# Patient Record
Sex: MALE | Race: WHITE | NOT HISPANIC OR LATINO | Employment: OTHER | URBAN - METROPOLITAN AREA
[De-identification: names, ages, dates, MRNs, and addresses within clinical notes are randomized per-mention and may not be internally consistent; named-entity substitution may affect disease eponyms.]

---

## 2017-01-06 ENCOUNTER — PATIENT OUTREACH (OUTPATIENT)
Dept: HEALTH INFORMATION MANAGEMENT | Facility: OTHER | Age: 77
End: 2017-01-06

## 2017-01-06 ENCOUNTER — TELEPHONE (OUTPATIENT)
Dept: MEDICAL GROUP | Facility: CLINIC | Age: 77
End: 2017-01-06

## 2017-01-06 NOTE — Clinical Note
[employer] [facility]  Edgardo Castano D.O.  [address]  [place] [address]  Fax: [phone] Authorization for Release/Disclosure of Protected Health Information   Name: SUKUMAR LIN PAGE : 1940 SSN: [id number]   Address: [facility] [place] [address] Phone:    [phone] (home)    I authorize the entity listed below to release/disclose the PHI below to [facility]/Edgardo Castano D.O.   Provider or Entity Name: Kendal Cotton O.D.         Address   City, [facility], [facility]   Phone:      Fax: [phone]     Reason for request: continuity of care   Information to be released:    [  ] LAST COLONOSCOPY, including any PATH REPORT [  ] LAST DEXA  [  ] LAST MAMMOGRAM  [  ] LAST PAP [ X ] RETINA EXAM REPORT  [  ] IMMUNIZATION RECORDS  [  ] Release all info      [  ] Check here and initial the line next to each item to release ALL health information INCLUDING  _____ Care and treatment for drug and / or alcohol abuse  _____ HIV testing, infection status, or AIDS  _____ Genetic Testing    DATES OF SERVICE OR TIME PERIOD TO BE DISCLOSED:Recent eye exam   I understand and acknowledge that:  * This Authorization may be revoked at any time by you in writing, except if your health information has already been used or disclosed.  * Your health information that will be used or disclosed as a result of you signing this authorization could be re-disclosed by the recipient. If this occurs, your re-disclosed health information may no longer be protected by State or Federal laws.  * You may refuse to sign this Authorization. Your refusal will not affect your ability to obtain treatment.  * This Authorization becomes effective upon signing and will  on (date) __________. If no date is indicated, this Authorization will  one (1) year from the signature date.    Name: Sukumar Lin Page    Signature:     Date: 2017

## 2017-01-06 NOTE — TELEPHONE ENCOUNTER
ANNUAL WELLNESS VISIT PRE-VISIT PLANNING     1.  Reviewed last PCP office visit assessment and plan notes: Yes    2.  If any orders were placed last visit do we have Results/Consult Notes?        •  Labs? Yes , completed 12/19/16       •  Imaging? No        •  Referrals? No     3.  Patient Care Coordination Note was updated with diagnosis information:  Yes, In your medical judgement are the following conditions valid-present for 2017, if so can you please assess and document:   C92.90              Myeloid Leukemia unspecified   E11.9                DM Type 2, uncomplicated   Z79.4                 Insulin use   E21.3                Hyperparathyroidism   I71.2                  Thoracic Aortic Aneurysm without Rupture    4.  Patient is due for these Health Maintenance Topics:   Health Maintenance Due   Topic Date Due   • Annual Wellness Visit  1940   • IMM ZOSTER VACCINE  10/30/1999   • IMM PNEUMOCOCCAL 65+ (ADULT) HIGH/HIGHEST RISK SERIES (2 of 2 - PPSV23) 03/08/2016          •  ALPA letter was faxed to:  Kendal Cotton O.D.   for Retinal exam records    5.  Immunizations were updated in [employer] using WebIZ?: Yes       •  Web Iz Recommendations:  PPSV 23       •  Is patient due for Tdap/Shingles? Yes.  If yes, was patient alerted of copay? Yes    6.  Patient has:       •   Diabetes    7.  Updated Care Team with [employer] and all specialists?        •   Gait devices, O2, CPAP, etc: N\A        •   Eye professional: yes       •   Other specialists (GYN, cardiology, endo, etc): N\A    8.  Is patient in need of any refills prior to office visit? No        9.  Patient was informed to arrive 15 min prior to their scheduled appointment and bring in their medication bottles? yes    10.  Patient was advised: “This is a free wellness visit. The provider will screen for medical conditions to help you stay healthy. If you have other concerns to address you may be asked to discuss these at a separate visit or there may be an  additional fee.”  Yes

## 2017-01-06 NOTE — PROGRESS NOTES
Outbound call to Sukumar for medication reconciliation.    Updated allergy and medication lists.    Patient demonstrates understanding of medication schedule and purpose for taking each medication.    Sukumar discontinued his muscle relaxants. He is now being seen by a chiropractor every month and is doing well.     Patient denies any side effects from his medications.     Sukumar has appropriate medication regimen for current disease states.    Laura Monte, PHARMD

## 2017-01-06 NOTE — Clinical Note
Request for Medical Records    Patient Name: Sukumar Mode Page    : 1940      Dear Doctor Kendal Cotton O.D.      The above named patient receives primary care at the [facility] by Edgardo Castano D.O..  The patient informs us that you are his eye care Provider.    Please fax a copy of the most recent eye exam to [phone] or answer the  questions below and fax this sheet back to us at the above number.  Attached is a signed Release of Information.      Date of last eye exam: _____________    Retinal eye exam summary:        Please select the choice(s) that apply.    ____ No diabetic retinopathy    ____    Diabetic retinopathy present      Printed Name and Credentials: __________________________________    Signature of Eye Care Provider: _________________________________    We appreciate your assistance and collaboration in providing efficient patient care!    Kindest Regards,    [address] [place] [address] [phone]

## 2017-01-16 RX ORDER — FUROSEMIDE 40 MG/1
TABLET ORAL
Qty: 90 TAB | Refills: 3 | Status: SHIPPED | OUTPATIENT
Start: 2017-01-16 | End: 2018-01-25 | Stop reason: SDUPTHER

## 2017-01-18 ENCOUNTER — OFFICE VISIT (OUTPATIENT)
Dept: MEDICAL GROUP | Facility: CLINIC | Age: 77
End: 2017-01-18
Payer: MEDICARE

## 2017-01-18 VITALS
DIASTOLIC BLOOD PRESSURE: 65 MMHG | TEMPERATURE: 98.8 F | BODY MASS INDEX: 40.09 KG/M2 | WEIGHT: 296 LBS | RESPIRATION RATE: 16 BRPM | SYSTOLIC BLOOD PRESSURE: 125 MMHG | HEIGHT: 72 IN | OXYGEN SATURATION: 96 % | HEART RATE: 62 BPM

## 2017-01-18 DIAGNOSIS — E11.9 TYPE II DIABETES MELLITUS, WELL CONTROLLED (HCC): ICD-10-CM

## 2017-01-18 DIAGNOSIS — C92.10 CML (CHRONIC MYELOCYTIC LEUKEMIA) (HCC): ICD-10-CM

## 2017-01-18 DIAGNOSIS — E78.5 DYSLIPIDEMIA: ICD-10-CM

## 2017-01-18 DIAGNOSIS — I10 ESSENTIAL HYPERTENSION: ICD-10-CM

## 2017-01-18 DIAGNOSIS — I35.1 AORTIC VALVE INSUFFICIENCY, UNSPECIFIED ETIOLOGY: ICD-10-CM

## 2017-01-18 DIAGNOSIS — Z23 NEED FOR 23-POLYVALENT PNEUMOCOCCAL POLYSACCHARIDE VACCINE: ICD-10-CM

## 2017-01-18 PROCEDURE — G0439 PPPS, SUBSEQ VISIT: HCPCS | Mod: 25 | Performed by: INTERNAL MEDICINE

## 2017-01-18 PROCEDURE — G0009 ADMIN PNEUMOCOCCAL VACCINE: HCPCS | Performed by: INTERNAL MEDICINE

## 2017-01-18 PROCEDURE — 90732 PPSV23 VACC 2 YRS+ SUBQ/IM: CPT | Performed by: INTERNAL MEDICINE

## 2017-01-18 ASSESSMENT — PATIENT HEALTH QUESTIONNAIRE - PHQ9: CLINICAL INTERPRETATION OF PHQ2 SCORE: 0

## 2017-01-18 NOTE — PROGRESS NOTES
"Chief Complaint   Patient presents with   • Annual Wellness Visit         HPI:  Sukumar is a 76 y.o. male here for Medicare Annual Wellness Visit         Patient Active Problem List    Diagnosis Date Noted   • Type II or unspecified type diabetes mellitus without mention of complication, not stated as uncontrolled 05/07/2015   • HTN (hypertension) 01/27/2012   • Dyslipidemia 01/27/2012   • Aortic valve insufficiency 08/25/2011   • CML (chronic myelocytic leukemia) (CMS-HCC) 08/25/2011       Current Outpatient Prescriptions   Medication Sig Dispense Refill   • furosemide (LASIX) 40 MG Tab TAKE ONE TABLET BY MOUTH ONCE DAILY 90 Tab 3   • benazepril (LOTENSIN) 10 MG Tab TAKE ONE TABLET BY MOUTH ONCE DAILY 90 Tab 3   • doxazosin (CARDURA) 2 MG Tab TAKE TWO TABLETS BY MOUTH ONCE DAILY 180 Tab 3   • CRESTOR 5 MG Tab TAKE ONE TABLET BY MOUTH IN THE EVENING 90 Tab 3   • KLOR-CON M20 20 MEQ Tab CR TAKE ONE TABLET BY MOUTH TWICE DAILY 180 Tab 3   • insulin glargine (LANTUS) 100 UNIT/ML Solution Inject 15 Units as instructed every bedtime. 10 mL 11   • ranitidine (ZANTAC) 150 MG Tab TAKE ONE TABLET BY MOUTH TWICE DAILY 180 Tab 3   • Cayenne Powder 40,000 Units by Does not apply route every day. 90 g 3   • Cholecalciferol (VITAMIN D3) 2000 UNITS TABS Take 2,000 Units by mouth every day.     • ascorbic acid (ASCORBIC ACID) 500 MG TABS Take 500 mg by mouth every day.     • Kristie, Zingiber officinalis, (KRISTIE ROOT) 550 MG CAPS Take  by mouth.     • imatinib (GLEEVEC) 100 MG TABS Take 300 mg by mouth every day. Takes 3 tabs once a day.     • INSULIN SYRINGE .5CC/28G 28G X 1/2\" 0.5 ML Misc 1 Each by Does not apply route every day. 50 Each 3     No current facility-administered medications for this visit.        The patient reports adherence to this regimen     Current supplements as per medication list.   Chronic narcotic pain medicines: no    Allergies: Nkda    Current social contact/activities: Ez enjoy his time with grand " daughters, shooting and hunting      Is patient current with immunizations?  no   If no, due for Shingles and PPSV 23     He  reports that he has never smoked. He has never used smokeless tobacco. He reports that he does not drink alcohol or use illicit drugs.  Counseling given: Not Answered        DPA/Advanced Directive:  Patient does not have an advanced directive. Not interested at this time     ROS:    Gait: Uses no assistive device   Ostomy: no   Other tubes: no   Amputations: no   Chronic oxygen use no   Last eye exam 2014   : Denies incontinence.       Screening:    DIABETES  1. Records requested for overdue  topics specifically for diabetes? yes      a. If yes, specifically requested: Retinal exam    2. Has patient ever had diabetes education? No            b. If not, is patient interested? no        Depression Screening    Little interest or pleasure in doing things?  0 - not at all  Feeling down, depressed, or hopeless?  0 - not at all  Patient Health Questionnaire Score: 0    If depressive symptoms identified deferred to follow up visit unless specifically addressed in assessment and plan.    Screening for Cognitive Impairment    Three Minute Recall (banana, sunrise, fence)  2/3    Draw clock face with all 12 numbers set to the hand to show 10 minutes past 11 o'clock  1 5/5  Cognitive concerns identified deferred for follow up unless specifically addressed in assessment and plan.    Fall Risk Assessment    Has the patient had two or more falls in the last year or any fall with injury in the last year?  No    Safety Assessment    Throw rugs on floor.  Yes  Handrails on all stairs.  Yes  Good lighting in all hallways.  Yes  Difficulty hearing.  No  Patient counseled about all safety risks that were identified.    Functional Assessment ADLs    Are there any barriers preventing you from cooking for yourself or meeting nutritional needs?  No.    Are there any barriers preventing you from driving safely or  obtaining transportation?  No.    Are there any barriers preventing you from using a telephone or calling for help?  No.    Are there any barriers preventing you from shopping?  No.    Are there any barriers preventing you from taking care of your own finances?  No.    Are there any barriers preventing you from managing your medications?  No.    Are currently engaging any exercise or physical activity?  No.       Health Maintenance Summary                Annual Wellness Visit Overdue 1940     IMM ZOSTER VACCINE Overdue 10/30/1999     IMM PNEUMOCOCCAL 65+ (ADULT) HIGH/HIGHEST RISK SERIES Overdue 3/8/2016      Done 1/12/2016 Imm Admin: Pneumococcal Conjugate Vaccine (Prevnar/PCV-13)    A1C SCREENING Next Due 6/19/2017      Done 12/19/2016 HEMOGLOBIN A1C (A)     Patient has more history with this topic...    DIABETES MONOFILAMENT / LE EXAM Next Due 10/11/2017      Done 10/11/2016 AMB DIABETIC MONOFILAMENT LOWER EXTREMITY EXAM    FASTING LIPID PROFILE Next Due 12/19/2017      Done 12/19/2016 LIPID PROFILE     Patient has more history with this topic...    URINE ACR / MICROALBUMIN Next Due 12/19/2017      Done 12/19/2016 MICROALBUMIN CREAT RATIO URINE     Patient has more history with this topic...    SERUM CREATININE Next Due 12/19/2017      Done 12/19/2016 BASIC METABOLIC PANEL (A)     Patient has more history with this topic...    COLONOSCOPY Next Due 12/9/2018      Done 12/9/2015 AMB REFERRAL TO GI FOR COLONOSCOPY     Patient has more history with this topic...    RETINAL SCREENING Next Due 10/1/2025      Done 10/1/2015 AMB REFERRAL FOR RETINAL SCREENING EXAM     Patient has more history with this topic...    IMM DTaP/Tdap/Td Vaccine Next Due 7/2/2026      Done 7/2/2016 Imm Admin: Tdap Vaccine          Patient Care Team:  Edgardo Castano D.O. as PCP - General (Internal Medicine)  Ez Huang M.D. as Consulting Physician (Cardiology)  Brennan Cole M.D. as Consulting Physician (Urology)  Eagle Velez  "M.D. as Consulting Physician (Oncology)  Kendal Cotton O.D. as Consulting Physician (Optometry)  Albaro Crowder M.D. as Consulting Physician (Nephrology)    Social History   Substance Use Topics   • Smoking status: Never Smoker    • Smokeless tobacco: Never Used   • Alcohol Use: No     Family History   Problem Relation Age of Onset   • Other Mother 94     age.    • Heart Disease Father      He  has a past medical history of Murmur, heart; Heart burn; Hypertension; Diabetes (2005); Unspecified urinary incontinence; Heart valve disease; Indigestion; Hemorrhoid; Hyperlipidemia; and Cancer (CMS-[facility]).   Past Surgical History   Procedure Laterality Date   • Brachy therapy  06   • Circumcision adult  8/17/2009     Performed by MIGUEL NEVILLE at SURGERY [facility]   • Trans urethral resection prostate  8/17/2009     Performed by MIGUEL NEVILLE at SURGERY [facility]   • Cystoscopy  1/25/2010     Performed by MIGUEL NEVILLE at SURGERY [facility]   • Trans urethral resection prostate  1/25/2010     Performed by MIGUEL NEVILLE at SURGERY [facility]   • Other abdominal surgery  2005     umbilical hernia repair   • Other  2007     vein stripping   • Cystoscopy  4/7/2011     Performed by MIGUEL NEVILLE at SURGERY [facility]   • Sphincter prosthesis placement  4/7/2011     Performed by MIGUEL NEVILLE at SURGERY [facility]   • Colon resection laparoscopic  8/30/2011     Performed by PRISCILLA ROGERS at SURGERY [facility] ORS   • Loi  1/30/2012     Performed by TREY TAPIA at ENDOSCOPY [facility]   • Wide excision  8/12/2014     Performed by Veronica Ruth M.D. at SURGERY SAME DAY [facility]           Exam:     Blood pressure 120/60, pulse 62, temperature 37.1 °C (98.8 °F), resp. rate 16, height 1.829 m (6' 0.01\"), weight 134.265 kg (296 lb), SpO2 96 %. Body mass index is 40.14 kg/(m^2).    Hearing fair.    Dentition fair  Alert, oriented in no acute distress.  Eye contact is good, speech " goal directed, affect calm       Assessment and Plan. The following treatment and monitoring plan is recommended:      1. Type II diabetes mellitus, well controlled (CMS-HCC)  Patient history of type 2 diabetes with excellent control no change in medical therapy.   - Annual Wellness Visit - Includes PPPS Subsequent ()    2. CML (chronic myelocytic leukemia) (CMS-HCC)  Patient with a history of chronic myelocyte leukemia under control followed by oncology.     3. Essential hypertension  Well-controlled no change in medical therapy    4. Dyslipidemia  Stable    5. Aortic valve insufficiency, unspecified etiology  Stable    6. Need for 23-polyvalent pneumococcal polysaccharide vaccine  Vaccination given  - PNEUMOCOCCAL POLYSACCHARIDE VACCINE 23-VALENT =>3YO SQ/IM    Services needed: No services needed at this time  Health Care Screening recommendations as per orders if indicated.  Referrals offered: PT/OT/Nutrition counseling/Behavioral Health/Smoking cessation as per orders if indicated.    Discussion today about general wellness and lifestyle habits:    · Prevent falls and reduce trip hazards; Cautioned about securing or removing rugs.  · Have a working fire alarm and carbon monoxide detector;   · Engage in regular physical activity and social activities   · No goals  (goals)      Follow-up: No Follow-up on file.

## 2017-01-18 NOTE — MR AVS SNAPSHOT
"        Sukumar Mode Page   2017 3:00 PM   Office Visit   MRN: [id number]    Department:  [facility]   Dept Phone:  [phone]    Description:  Male : 1940   Provider:  Edgardo Castano D.O.; [facility]            Reason for Visit     Annual Wellness Visit           Allergies as of 2017     Allergen Noted Reactions    Nkda [No Known Drug Allergy] 2010         You were diagnosed with     Type II diabetes mellitus, well controlled (CMS-HCC)   [729485]       Need for 23-polyvalent pneumococcal polysaccharide vaccine   [7969391]         Vital Signs     Blood Pressure Pulse Temperature Respirations Height Weight    125/65 mmHg 62 37.1 °C (98.8 °F) 16 1.829 m (6' 0.01\") 134.265 kg (296 lb)    Body Mass Index Oxygen Saturation Smoking Status             40.14 kg/m2 96% Never Smoker          Basic Information     Date Of Birth Sex Race Ethnicity Preferred Language    1940 Male White Non- English      Your appointments     May 02, 2017  9:20 AM   Established Patient with Edgardo Castano D.O.   [address] [address]   [phone]           You will be receiving a confirmation call a few days before your appointment from our automated call confirmation system.              Problem List              ICD-10-CM Priority Class Noted - Resolved    Aortic valve insufficiency I35.1   2011 - Present    CML (chronic myelocytic leukemia) (CMS-HCC) C92.10   2011 - Present    HTN (hypertension) I10   2012 - Present    Dyslipidemia E78.5   2012 - Present    Type II diabetes mellitus, well controlled (CMS-HCC) E11.9   2017 - Present      Health Maintenance        Date Due Completion Dates    IMM ZOSTER VACCINE 10/30/1999 ---    IMM PNEUMOCOCCAL 65+ (ADULT) HIGH/HIGHEST RISK SERIES (2 of 2 - PPSV23) 3/8/2016 2016    A1C SCREENING 2016, 8/15/2016, 2016, 2016, 3/2/2015, " 12/3/2014, 6/25/2014, 12/2/2013, 8/6/2013, 5/6/2013, 12/11/2012, 9/20/2012, 3/26/2012, 9/19/2011    DIABETES MONOFILAMENT / LE EXAM 10/11/2017 10/11/2016    FASTING LIPID PROFILE 12/19/2017 12/19/2016, 2/4/2014, 6/10/2013, 5/6/2013, 12/11/2012, 9/20/2012    URINE ACR / MICROALBUMIN 12/19/2017 12/19/2016, 8/15/2016, 6/25/2014, 12/2/2013, 12/11/2012, 9/20/2012, 3/26/2012, 9/19/2011    SERUM CREATININE 12/19/2017 12/19/2016, 12/19/2016, 9/6/2016, 8/15/2016, 8/15/2016, 7/14/2016, 6/27/2016, 6/27/2016, 4/1/2016, 2/2/2016, 10/1/2015, 6/16/2015, 6/16/2015, 3/2/2015, 3/2/2015, 12/3/2014, 9/2/2014, 8/4/2014, 5/15/2014, 2/4/2014, 1/17/2014, 11/4/2013, 6/10/2013, 6/10/2013, 5/6/2013, 2/15/2013, 12/26/2012, 11/9/2012, 7/5/2012, 4/26/2012, 3/6/2012, 2/28/2012, 2/21/2012, 2/10/2012, 2/3/2012, 2/1/2012, 1/31/2012, 1/29/2012, 1/28/2012, 1/27/2012, 1/26/2012, 1/6/2012, 9/1/2011, 8/31/2011, 8/30/2011, 8/19/2011, 7/5/2011, 4/8/2011, 11/4/2010, 1/25/2010, 8/18/2009, 8/17/2009, 8/11/2009    COLONOSCOPY 12/9/2018 12/9/2015, 12/7/2012    RETINAL SCREENING 10/1/2025 10/1/2015, 1/24/2014    IMM DTaP/Tdap/Td Vaccine (2 - Td) 7/2/2026 7/2/2016            Current Immunizations     13-VALENT PCV PREVNAR 1/12/2016    Influenza Vaccine 10/1/2010    Influenza Vaccine Adult HD 10/11/2016  4:00 PM, 9/27/2014    Influenza Vaccine Quad Inj (Preserved) 10/7/2015    Pneumococcal Vaccine (UF)Historical Data 10/1/2006    Pneumococcal polysaccharide vaccine (PPSV-23)  Incomplete    Tdap Vaccine 7/2/2016 11:34 AM      Below and/or attached are the medications your provider expects you to take. Review all of your home medications and newly ordered medications with your provider and/or pharmacist. Follow medication instructions as directed by your provider and/or pharmacist. Please keep your medication list with you and share with your provider. Update the information when medications are discontinued, doses are changed, or new medications (including  "over-the-counter products) are added; and carry medication information at all times in the event of emergency situations     Allergies:  NKDA - (reactions not documented)               Medications  Valid as of: January 18, 2017 -  4:18 PM    Generic Name Brand Name Tablet Size Instructions for use    Ascorbic Acid (Tab) ascorbic acid 500 MG Take 500 mg by mouth every day.        Benazepril HCl (Tab) LOTENSIN 10 MG TAKE ONE TABLET BY MOUTH ONCE DAILY        Capsicum (Cayenne) (Powder) Cayenne  40,000 Units by Does not apply route every day.        Cholecalciferol (Tab) Vitamin D3 2000 UNITS Take 2,000 Units by mouth every day.        Doxazosin Mesylate (Tab) CARDURA 2 MG TAKE TWO TABLETS BY MOUTH ONCE DAILY        Furosemide (Tab) LASIX 40 MG TAKE ONE TABLET BY MOUTH ONCE DAILY        Kristie (Zingiber officinalis) (Cap) Kristie Root 550 MG Take  by mouth.        Imatinib Mesylate (Tab) GLEEVEC 100 MG Take 300 mg by mouth every day. Takes 3 tabs once a day.        Insulin Glargine (Solution) LANTUS 100 UNIT/ML Inject 15 Units as instructed every bedtime.        Insulin Syringe-Needle U-100 (Misc) INSULIN SYRINGE .5CC/28G 28G X 1/2\" 0.5 ML 1 Each by Does not apply route every day.        Potassium Chloride Anabelle CR (Tab CR) KLOR-CON M20 20 MEQ TAKE ONE TABLET BY MOUTH TWICE DAILY        RaNITidine HCl (Tab) ZANTAC 150 MG TAKE ONE TABLET BY MOUTH TWICE DAILY        Rosuvastatin Calcium (Tab) CRESTOR 5 MG TAKE ONE TABLET BY MOUTH IN THE EVENING        .                 Medicines prescribed today were sent to:     [facility] [address] [place] [id number]    Phone: [phone] Fax: [phone]    Open 24 Hours?: No      Medication refill instructions:       If your prescription bottle indicates you have medication refills left, it is not necessary to call your provider’s office. Please contact your pharmacy and they will refill your medication.    If your " prescription bottle indicates you do not have any refills left, you may request refills at any time through one of the following ways: The online [facility] system (except Urgent Care), by calling your provider’s office, or by asking your pharmacy to contact your provider’s office with a refill request. Medication refills are processed only during regular business hours and may not be available until the next business day. Your provider may request additional information or to have a follow-up visit with you prior to refilling your medication.   *Please Note: Medication refills are assigned a new Rx number when refilled electronically. Your pharmacy may indicate that no refills were authorized even though a new prescription for the same medication is available at the pharmacy. Please request the medicine by name with the pharmacy before contacting your provider for a refill.        Other Notes About Your Plan     per Nephrology visit of 6/29/15-patient has proteinuria secondary to diabetic nephropathy. Code 250.40           [facility] Status: Patient Declined

## 2017-01-23 RX ORDER — RANITIDINE 150 MG/1
TABLET ORAL
Qty: 180 TAB | Refills: 3 | Status: SHIPPED | OUTPATIENT
Start: 2017-01-23

## 2017-02-13 RX ORDER — SYRINGE AND NEEDLE,INSULIN,1ML 29 G X1/2"
SYRINGE, EMPTY DISPOSABLE MISCELLANEOUS
Qty: 100 EACH | Refills: 3 | Status: SHIPPED | OUTPATIENT
Start: 2017-02-13 | End: 2017-02-22 | Stop reason: SDUPTHER

## 2017-02-22 RX ORDER — IBUPROFEN 200 MG
TABLET ORAL
Qty: 100 EACH | Refills: 11 | Status: SHIPPED | OUTPATIENT
Start: 2017-02-22 | End: 2017-05-25

## 2017-02-22 NOTE — TELEPHONE ENCOUNTER
Pharmacy requesting to change the SIG because pt uses BID the insulin but the syringes stated QD. Also needed the ICD 10 on script for insurance purpose    Was the patient seen in the last year in this department? Yes     Does patient have an active prescription for medications requested? Yes     Received Request Via: Pharmacy

## 2017-03-26 RX ORDER — INSULIN GLARGINE 100 [IU]/ML
INJECTION, SOLUTION SUBCUTANEOUS
Qty: 10 ML | Refills: 4 | Status: SHIPPED | OUTPATIENT
Start: 2017-03-26 | End: 2017-05-25

## 2017-05-01 ENCOUNTER — TELEPHONE (OUTPATIENT)
Dept: MEDICAL GROUP | Facility: CLINIC | Age: 77
End: 2017-05-01

## 2017-05-01 NOTE — TELEPHONE ENCOUNTER
ESTABLISHED PATIENT PRE-VISIT PLANNING     Note: Patient will not be contacted if there is no indication to call.     1.  Reviewed note from last office visit with PCP and/or other med group provider: Yes    2.  If any orders were placed at last visit, do we have Results/Consult Notes?        •  Labs - Labs were not ordered at last office visit.       •  Imaging - Imaging was not ordered at last office visit.       •  Referrals - No referrals were ordered at last office visit.    3.  Immunizations were updated in [facility] using WebIZ?: Yes       •  Web Iz Recommendations: HEPATITIS A  HEPATITIS B TD ZOSTAVAX (Shingles)    4.  Patient is due for the following Health Maintenance Topics:   Health Maintenance Due   Topic Date Due   • Annual Wellness Visit  1940   • IMM ZOSTER VACCINE  10/30/1999       5.  Patient was not informed to arrive 15 min prior to their scheduled appointment and bring in their medication bottles.

## 2017-05-08 ENCOUNTER — OFFICE VISIT (OUTPATIENT)
Dept: MEDICAL GROUP | Facility: CLINIC | Age: 77
End: 2017-05-08
Payer: MEDICARE

## 2017-05-08 VITALS
RESPIRATION RATE: 14 BRPM | OXYGEN SATURATION: 95 % | TEMPERATURE: 97.1 F | HEART RATE: 64 BPM | BODY MASS INDEX: 40.5 KG/M2 | DIASTOLIC BLOOD PRESSURE: 70 MMHG | HEIGHT: 72 IN | WEIGHT: 299 LBS | SYSTOLIC BLOOD PRESSURE: 130 MMHG

## 2017-05-08 DIAGNOSIS — E66.01 MORBID OBESITY WITH BMI OF 40.0-44.9, ADULT (HCC): ICD-10-CM

## 2017-05-08 DIAGNOSIS — N18.9 CHRONIC KIDNEY DISEASE, UNSPECIFIED STAGE: ICD-10-CM

## 2017-05-08 DIAGNOSIS — C92.10 CML (CHRONIC MYELOCYTIC LEUKEMIA) (HCC): ICD-10-CM

## 2017-05-08 DIAGNOSIS — E11.9 TYPE II DIABETES MELLITUS, WELL CONTROLLED (HCC): ICD-10-CM

## 2017-05-08 DIAGNOSIS — R23.9 SKIN CHANGE: ICD-10-CM

## 2017-05-08 PROCEDURE — 4040F PNEUMOC VAC/ADMIN/RCVD: CPT | Performed by: INTERNAL MEDICINE

## 2017-05-08 PROCEDURE — 1036F TOBACCO NON-USER: CPT | Performed by: INTERNAL MEDICINE

## 2017-05-08 PROCEDURE — 1101F PT FALLS ASSESS-DOCD LE1/YR: CPT | Performed by: INTERNAL MEDICINE

## 2017-05-08 PROCEDURE — G8432 DEP SCR NOT DOC, RNG: HCPCS | Performed by: INTERNAL MEDICINE

## 2017-05-08 PROCEDURE — 99214 OFFICE O/P EST MOD 30 MIN: CPT | Performed by: INTERNAL MEDICINE

## 2017-05-08 PROCEDURE — G8419 CALC BMI OUT NRM PARAM NOF/U: HCPCS | Performed by: INTERNAL MEDICINE

## 2017-05-08 NOTE — MR AVS SNAPSHOT
"        Sukumar Mode Page   2017 9:20 AM   Office Visit   MRN: [id number]    Department:  [facility]   Dept Phone:  [phone]    Description:  Male : 1940   Provider:  Edgardo Castano D.O.           Reason for Visit     Follow-Up           Allergies as of 2017     Allergen Noted Reactions    Nkda [No Known Drug Allergy] 2010         You were diagnosed with     Morbid obesity with BMI of 40.0-44.9, adult (CMS-HCC)   [979223]       CML (chronic myelocytic leukemia) (CMS-HCC)   [246897]       Type II diabetes mellitus, well controlled (CMS-HCC)   [447648]       Chronic kidney disease, unspecified stage   [0468439]       Skin change   [736036]         Vital Signs     Blood Pressure Pulse Temperature Respirations Height Weight    130/70 mmHg 64 36.2 °C (97.1 °F) 14 1.829 m (6' 0.01\") 135.626 kg (299 lb)    Body Mass Index Oxygen Saturation Smoking Status             40.54 kg/m2 95% Never Smoker          Basic Information     Date Of Birth Sex Race Ethnicity Preferred Language    1940 Male White Non- English      Your appointments     Sep 05, 2017  9:20 AM   Established Patient with Edgardo Castano D.O.   [address] [id number] [phone]           You will be receiving a confirmation call a few days before your appointment from our automated call confirmation system.              Problem List              ICD-10-CM Priority Class Noted - Resolved    Aortic valve insufficiency I35.1   2011 - Present    CML (chronic myelocytic leukemia) (CMS-HCC) C92.10   2011 - Present    HTN (hypertension) I10   2012 - Present    Dyslipidemia E78.5   2012 - Present    Type II diabetes mellitus, well controlled (CMS-HCC) E11.9   2017 - Present    Morbid obesity with BMI of 40.0-44.9, adult ([facility]) E66.01, Z68.41   2017 - Present      Health Maintenance        Date Due Completion Dates    IMM ZOSTER " VACCINE 10/30/1999 ---    A1C SCREENING 6/19/2017 12/19/2016, 8/15/2016, 6/27/2016, 4/1/2016, 3/2/2015, 12/3/2014, 6/25/2014, 12/2/2013, 8/6/2013, 5/6/2013, 12/11/2012, 9/20/2012, 3/26/2012, 9/19/2011    DIABETES MONOFILAMENT / LE EXAM 10/11/2017 10/11/2016    FASTING LIPID PROFILE 12/19/2017 12/19/2016, 2/4/2014, 6/10/2013, 5/6/2013, 12/11/2012, 9/20/2012    URINE ACR / MICROALBUMIN 12/19/2017 12/19/2016, 8/15/2016, 6/25/2014, 12/2/2013, 12/11/2012, 9/20/2012, 3/26/2012, 9/19/2011    SERUM CREATININE 12/19/2017 12/19/2016, 12/19/2016, 9/6/2016, 8/15/2016, 8/15/2016, 7/14/2016, 6/27/2016, 6/27/2016, 4/1/2016, 2/2/2016, 10/1/2015, 6/16/2015, 6/16/2015, 3/2/2015, 3/2/2015, 12/3/2014, 9/2/2014, 8/4/2014, 5/15/2014, 2/4/2014, 1/17/2014, 11/4/2013, 6/10/2013, 6/10/2013, 5/6/2013, 2/15/2013, 12/26/2012, 11/9/2012, 7/5/2012, 4/26/2012, 3/6/2012, 2/28/2012, 2/21/2012, 2/10/2012, 2/3/2012, 2/1/2012, 1/31/2012, 1/29/2012, 1/28/2012, 1/27/2012, 1/26/2012, 1/6/2012, 9/1/2011, 8/31/2011, 8/30/2011, 8/19/2011, 7/5/2011, 4/8/2011, 11/4/2010, 1/25/2010, 8/18/2009, 8/17/2009, 8/11/2009    RETINAL SCREENING 3/7/2018 3/7/2017, 10/1/2015, 1/24/2014    COLONOSCOPY 12/9/2018 12/9/2015, 12/7/2012    IMM DTaP/Tdap/Td Vaccine (2 - Td) 7/2/2026 7/2/2016            Current Immunizations     13-VALENT PCV PREVNAR 1/12/2016    Influenza Vaccine 10/1/2010    Influenza Vaccine Adult HD 10/11/2016  4:00 PM, 9/27/2014    Influenza Vaccine Quad Inj (Preserved) 10/7/2015    Pneumococcal Vaccine (UF)Historical Data 10/1/2006    Pneumococcal polysaccharide vaccine (PPSV-23) 1/18/2017    Tdap Vaccine 7/2/2016 11:34 AM      Below and/or attached are the medications your provider expects you to take. Review all of your home medications and newly ordered medications with your provider and/or pharmacist. Follow medication instructions as directed by your provider and/or pharmacist. Please keep your medication list with you and share with your provider.  "Update the information when medications are discontinued, doses are changed, or new medications (including over-the-counter products) are added; and carry medication information at all times in the event of emergency situations     Allergies:  NKDA - (reactions not documented)               Medications  Valid as of: May 08, 2017 -  9:57 AM    Generic Name Brand Name Tablet Size Instructions for use    Ascorbic Acid (Tab) ascorbic acid 500 MG Take 500 mg by mouth every day.        Benazepril HCl (Tab) LOTENSIN 10 MG TAKE ONE TABLET BY MOUTH ONCE DAILY        Capsicum (Cayenne) (Powder) Cayenne  40,000 Units by Does not apply route every day.        Cholecalciferol (Tab) Vitamin D3 2000 UNITS Take 2,000 Units by mouth every day.        Doxazosin Mesylate (Tab) CARDURA 2 MG TAKE TWO TABLETS BY MOUTH ONCE DAILY        Furosemide (Tab) LASIX 40 MG TAKE ONE TABLET BY MOUTH ONCE DAILY        Kristie (Zingiber officinalis) (Cap) Kristie Root 550 MG Take  by mouth.        Imatinib Mesylate (Tab) GLEEVEC 100 MG Take 300 mg by mouth every day. Takes 3 tabs once a day.        Insulin Glargine (Solution) LANTUS 100 UNIT/ML Inject 15 Units as instructed every bedtime.        Insulin Glargine (Solution) LANTUS 100 UNIT/ML INJECT 10 UNITS SUBCUTANEOUSLY AT BEDTIME AS DIRECTED        Insulin Syringe-Needle U-100 (Misc) INSULIN SYRINGE .5CC/29G 29G X 1/2\" 0.5 ML USE WITH INSULIN TWICE DAILY ICD 10 [E11.9]        Potassium Chloride Anabelle CR (Tab CR) KLOR-CON M20 20 MEQ TAKE ONE TABLET BY MOUTH TWICE DAILY        RaNITidine HCl (Tab) ZANTAC 150 MG TAKE ONE TABLET BY MOUTH TWICE DAILY        Rosuvastatin Calcium (Tab) CRESTOR 5 MG TAKE ONE TABLET BY MOUTH IN THE EVENING        .                 Medicines prescribed today were sent to:     [facility] [address] [place] [address] - [address] [place] [id number]    Phone: [phone] Fax: [phone]    Open 24 Hours?: No      Medication refill instructions:     "   If your prescription bottle indicates you have medication refills left, it is not necessary to call your provider’s office. Please contact your pharmacy and they will refill your medication.    If your prescription bottle indicates you do not have any refills left, you may request refills at any time through one of the following ways: The online [employer] system (except Urgent Care), by calling your provider’s office, or by asking your pharmacy to contact your provider’s office with a refill request. Medication refills are processed only during regular business hours and may not be available until the next business day. Your provider may request additional information or to have a follow-up visit with you prior to refilling your medication.   *Please Note: Medication refills are assigned a new Rx number when refilled electronically. Your pharmacy may indicate that no refills were authorized even though a new prescription for the same medication is available at the pharmacy. Please request the medicine by name with the pharmacy before contacting your provider for a refill.        Your To Do List     Future Labs/Procedures Complete By Expires    CBC WITH DIFFERENTIAL  As directed 5/9/2018    COMP METABOLIC PANEL  As directed 5/9/2018    HEMOGLOBIN A1C  As directed 5/9/2018    LIPID PROFILE  As directed 5/9/2018      Referral     A referral request has been sent to our patient care coordination department. Please allow 3-5 business days for us to process this request and contact you either by phone or mail. If you do not hear from us by the 5th business day, please call us at [phone].        Other Notes About Your Plan     per Nephrology visit of 6/29/15-patient has proteinuria secondary to diabetic nephropathy. Code 250.40           [employer] Status: Patient Declined

## 2017-05-09 ENCOUNTER — HOSPITAL ENCOUNTER (OUTPATIENT)
Dept: LAB | Facility: MEDICAL CENTER | Age: 77
End: 2017-05-09
Attending: INTERNAL MEDICINE
Payer: MEDICARE

## 2017-05-09 ENCOUNTER — HOSPITAL ENCOUNTER (OUTPATIENT)
Dept: LAB | Facility: MEDICAL CENTER | Age: 77
End: 2017-05-09
Attending: NURSE PRACTITIONER
Payer: MEDICARE

## 2017-05-09 DIAGNOSIS — E11.9 TYPE II DIABETES MELLITUS, WELL CONTROLLED (HCC): ICD-10-CM

## 2017-05-09 DIAGNOSIS — C92.10 CML (CHRONIC MYELOCYTIC LEUKEMIA) (HCC): ICD-10-CM

## 2017-05-09 DIAGNOSIS — N18.9 CHRONIC KIDNEY DISEASE, UNSPECIFIED STAGE: ICD-10-CM

## 2017-05-09 LAB
25(OH)D3 SERPL-MCNC: 33 NG/ML (ref 30–100)
ALBUMIN SERPL BCP-MCNC: 4 G/DL (ref 3.2–4.9)
ALBUMIN SERPL BCP-MCNC: 4 G/DL (ref 3.2–4.9)
ALBUMIN/GLOB SERPL: 1.7 G/DL
ALBUMIN/GLOB SERPL: 1.8 G/DL
ALP SERPL-CCNC: 49 U/L (ref 30–99)
ALP SERPL-CCNC: 49 U/L (ref 30–99)
ALT SERPL-CCNC: 14 U/L (ref 2–50)
ALT SERPL-CCNC: 14 U/L (ref 2–50)
ANION GAP SERPL CALC-SCNC: 7 MMOL/L (ref 0–11.9)
ANION GAP SERPL CALC-SCNC: 7 MMOL/L (ref 0–11.9)
APPEARANCE UR: CLEAR
AST SERPL-CCNC: 26 U/L (ref 12–45)
AST SERPL-CCNC: 27 U/L (ref 12–45)
BASOPHILS # BLD AUTO: 0.4 % (ref 0–1.8)
BASOPHILS # BLD AUTO: 0.8 % (ref 0–1.8)
BASOPHILS # BLD: 0.02 K/UL (ref 0–0.12)
BASOPHILS # BLD: 0.04 K/UL (ref 0–0.12)
BILIRUB SERPL-MCNC: 0.7 MG/DL (ref 0.1–1.5)
BILIRUB SERPL-MCNC: 0.7 MG/DL (ref 0.1–1.5)
BILIRUB UR QL STRIP.AUTO: NEGATIVE
BUN SERPL-MCNC: 40 MG/DL (ref 8–22)
BUN SERPL-MCNC: 41 MG/DL (ref 8–22)
CALCIUM SERPL-MCNC: 8.9 MG/DL (ref 8.5–10.5)
CALCIUM SERPL-MCNC: 8.9 MG/DL (ref 8.5–10.5)
CHLORIDE SERPL-SCNC: 106 MMOL/L (ref 96–112)
CHLORIDE SERPL-SCNC: 106 MMOL/L (ref 96–112)
CHOLEST SERPL-MCNC: 136 MG/DL (ref 100–199)
CO2 SERPL-SCNC: 25 MMOL/L (ref 20–33)
CO2 SERPL-SCNC: 25 MMOL/L (ref 20–33)
COLOR UR: COLORLESS
CREAT SERPL-MCNC: 2.03 MG/DL (ref 0.5–1.4)
CREAT SERPL-MCNC: 2.06 MG/DL (ref 0.5–1.4)
CREAT UR-MCNC: 23.7 MG/DL
EOSINOPHIL # BLD AUTO: 0.31 K/UL (ref 0–0.51)
EOSINOPHIL # BLD AUTO: 0.32 K/UL (ref 0–0.51)
EOSINOPHIL NFR BLD: 6.4 % (ref 0–6.9)
EOSINOPHIL NFR BLD: 6.5 % (ref 0–6.9)
ERYTHROCYTE [DISTWIDTH] IN BLOOD BY AUTOMATED COUNT: 48.4 FL (ref 35.9–50)
ERYTHROCYTE [DISTWIDTH] IN BLOOD BY AUTOMATED COUNT: 48.7 FL (ref 35.9–50)
EST. AVERAGE GLUCOSE BLD GHB EST-MCNC: 180 MG/DL
GFR SERPL CREATININE-BSD FRML MDRD: 31 ML/MIN/1.73 M 2
GFR SERPL CREATININE-BSD FRML MDRD: 32 ML/MIN/1.73 M 2
GLOBULIN SER CALC-MCNC: 2.2 G/DL (ref 1.9–3.5)
GLOBULIN SER CALC-MCNC: 2.3 G/DL (ref 1.9–3.5)
GLUCOSE SERPL-MCNC: 179 MG/DL (ref 65–99)
GLUCOSE SERPL-MCNC: 181 MG/DL (ref 65–99)
GLUCOSE UR STRIP.AUTO-MCNC: NEGATIVE MG/DL
HBA1C MFR BLD: 7.9 % (ref 0–5.6)
HCT VFR BLD AUTO: 37.8 % (ref 42–52)
HCT VFR BLD AUTO: 38.1 % (ref 42–52)
HDLC SERPL-MCNC: 50 MG/DL
HGB BLD-MCNC: 12.4 G/DL (ref 14–18)
HGB BLD-MCNC: 12.5 G/DL (ref 14–18)
IMM GRANULOCYTES # BLD AUTO: 0.01 K/UL (ref 0–0.11)
IMM GRANULOCYTES # BLD AUTO: 0.01 K/UL (ref 0–0.11)
IMM GRANULOCYTES NFR BLD AUTO: 0.2 % (ref 0–0.9)
IMM GRANULOCYTES NFR BLD AUTO: 0.2 % (ref 0–0.9)
KETONES UR STRIP.AUTO-MCNC: NEGATIVE MG/DL
LDLC SERPL CALC-MCNC: 69 MG/DL
LEUKOCYTE ESTERASE UR QL STRIP.AUTO: NEGATIVE
LYMPHOCYTES # BLD AUTO: 0.8 K/UL (ref 1–4.8)
LYMPHOCYTES # BLD AUTO: 0.82 K/UL (ref 1–4.8)
LYMPHOCYTES NFR BLD: 16.4 % (ref 22–41)
LYMPHOCYTES NFR BLD: 16.8 % (ref 22–41)
MCH RBC QN AUTO: 31.2 PG (ref 27–33)
MCH RBC QN AUTO: 31.3 PG (ref 27–33)
MCHC RBC AUTO-ENTMCNC: 32.8 G/DL (ref 33.7–35.3)
MCHC RBC AUTO-ENTMCNC: 32.8 G/DL (ref 33.7–35.3)
MCV RBC AUTO: 95.2 FL (ref 81.4–97.8)
MCV RBC AUTO: 95.3 FL (ref 81.4–97.8)
MICRO URNS: NORMAL
MONOCYTES # BLD AUTO: 0.44 K/UL (ref 0–0.85)
MONOCYTES # BLD AUTO: 0.45 K/UL (ref 0–0.85)
MONOCYTES NFR BLD AUTO: 8.8 % (ref 0–13.4)
MONOCYTES NFR BLD AUTO: 9.4 % (ref 0–13.4)
NEUTROPHILS # BLD AUTO: 3.18 K/UL (ref 1.82–7.42)
NEUTROPHILS # BLD AUTO: 3.37 K/UL (ref 1.82–7.42)
NEUTROPHILS NFR BLD: 66.7 % (ref 44–72)
NEUTROPHILS NFR BLD: 67.4 % (ref 44–72)
NITRITE UR QL STRIP.AUTO: NEGATIVE
NRBC # BLD AUTO: 0 K/UL
NRBC # BLD AUTO: 0 K/UL
NRBC BLD AUTO-RTO: 0 /100 WBC
NRBC BLD AUTO-RTO: 0 /100 WBC
PH UR STRIP.AUTO: 6.5 [PH]
PLATELET # BLD AUTO: 122 K/UL (ref 164–446)
PLATELET # BLD AUTO: 123 K/UL (ref 164–446)
PMV BLD AUTO: 11.5 FL (ref 9–12.9)
PMV BLD AUTO: 11.6 FL (ref 9–12.9)
POTASSIUM SERPL-SCNC: 4.2 MMOL/L (ref 3.6–5.5)
POTASSIUM SERPL-SCNC: 4.2 MMOL/L (ref 3.6–5.5)
PROT SERPL-MCNC: 6.2 G/DL (ref 6–8.2)
PROT SERPL-MCNC: 6.3 G/DL (ref 6–8.2)
PROT UR QL STRIP: NEGATIVE MG/DL
PROT UR-MCNC: <4 MG/DL (ref 0–15)
PROT/CREAT UR: NORMAL MG/G (ref 15–68)
PTH-INTACT SERPL-MCNC: 85.9 PG/ML (ref 14–72)
RBC # BLD AUTO: 3.97 M/UL (ref 4.7–6.1)
RBC # BLD AUTO: 4 M/UL (ref 4.7–6.1)
RBC UR QL AUTO: NEGATIVE
SODIUM SERPL-SCNC: 138 MMOL/L (ref 135–145)
SODIUM SERPL-SCNC: 138 MMOL/L (ref 135–145)
SP GR UR STRIP.AUTO: 1.01
TRIGL SERPL-MCNC: 85 MG/DL (ref 0–149)
WBC # BLD AUTO: 4.8 K/UL (ref 4.8–10.8)
WBC # BLD AUTO: 5 K/UL (ref 4.8–10.8)

## 2017-05-09 PROCEDURE — 80061 LIPID PANEL: CPT

## 2017-05-09 PROCEDURE — 80053 COMPREHEN METABOLIC PANEL: CPT

## 2017-05-09 PROCEDURE — 85025 COMPLETE CBC W/AUTO DIFF WBC: CPT

## 2017-05-09 PROCEDURE — 36415 COLL VENOUS BLD VENIPUNCTURE: CPT

## 2017-05-09 PROCEDURE — 83036 HEMOGLOBIN GLYCOSYLATED A1C: CPT

## 2017-05-09 NOTE — PROGRESS NOTES
"CC: Sukumar Quiñonez is a 76 y.o. male is suffering from   Chief Complaint   Patient presents with   • Follow-Up         SUBJECTIVE:  1. Morbid obesity with BMI of 40.0-44.9, adult (CMS-HCC)  Sainz here for follow-up, we've discussed importance of his diet exercise, patient suffers from morbid obesity    2. CML (chronic myelocytic leukemia) (CMS-HCC)  I have discussed with the patient is chronic myelocytic leukemia, this appears to be stable's being followed by Dr. Oates.     3. Type II diabetes mellitus, well controlled (CMS-HCC)  History of type 2 diabetes with well-controlled    4. Chronic kidney disease, unspecified stage  History of chronic kidney disease stable    5. Skin change  Skin changes right ear concerning for possible early basal cell carcinoma referral written to Dr. Longoria for possible biopsy        Past social, family, history:    Social History   Substance Use Topics   • Smoking status: Never Smoker    • Smokeless tobacco: Never Used   • Alcohol Use: No         MEDICATIONS:    Current outpatient prescriptions:   •  LANTUS 100 UNIT/ML Solution, INJECT 10 UNITS SUBCUTANEOUSLY AT BEDTIME AS DIRECTED, Disp: 10 mL, Rfl: 4  •  INSULIN SYRINGE .5CC/29G (RELION INSULIN SYR 0.5CC/29G) 29G X 1/2\" 0.5 ML Misc, USE WITH INSULIN TWICE DAILY ICD 10 [E11.9], Disp: 100 Each, Rfl: 11  •  ranitidine (ZANTAC) 150 MG Tab, TAKE ONE TABLET BY MOUTH TWICE DAILY, Disp: 180 Tab, Rfl: 3  •  furosemide (LASIX) 40 MG Tab, TAKE ONE TABLET BY MOUTH ONCE DAILY, Disp: 90 Tab, Rfl: 3  •  benazepril (LOTENSIN) 10 MG Tab, TAKE ONE TABLET BY MOUTH ONCE DAILY, Disp: 90 Tab, Rfl: 3  •  doxazosin (CARDURA) 2 MG Tab, TAKE TWO TABLETS BY MOUTH ONCE DAILY, Disp: 180 Tab, Rfl: 3  •  CRESTOR 5 MG Tab, TAKE ONE TABLET BY MOUTH IN THE EVENING, Disp: 90 Tab, Rfl: 3  •  KLOR-CON M20 20 MEQ Tab CR, TAKE ONE TABLET BY MOUTH TWICE DAILY, Disp: 180 Tab, Rfl: 3  •  insulin glargine (LANTUS) 100 UNIT/ML Solution, Inject 15 Units as instructed " "every bedtime., Disp: 10 mL, Rfl: 11  •  Cayenne Powder, 40,000 Units by Does not apply route every day., Disp: 90 g, Rfl: 3  •  Cholecalciferol (VITAMIN D3) 2000 UNITS TABS, Take 2,000 Units by mouth every day., Disp: , Rfl:   •  ascorbic acid (ASCORBIC ACID) 500 MG TABS, Take 500 mg by mouth every day., Disp: , Rfl:   •  Kristie, Zingiber officinalis, (KRISTIE ROOT) 550 MG CAPS, Take  by mouth., Disp: , Rfl:   •  imatinib (GLEEVEC) 100 MG TABS, Take 300 mg by mouth every day. Takes 3 tabs once a day., Disp: , Rfl:     PROBLEMS:  Patient Active Problem List    Diagnosis Date Noted   • Morbid obesity with BMI of 40.0-44.9, adult ([facility]) 05/08/2017   • Type II diabetes mellitus, well controlled (CMS-HCC) 01/18/2017   • HTN (hypertension) 01/27/2012   • Dyslipidemia 01/27/2012   • Aortic valve insufficiency 08/25/2011   • CML (chronic myelocytic leukemia) (CMS-HCC) 08/25/2011       REVIEW OF SYSTEMS:  Gen.:  No Nausea, Vomiting, fever, Chills.  Heart: No chest pain.  Lungs:  No shortness of Breath.  Psychological: Carlos unusual Anxiety depression     PHYSICAL EXAM   Constitutional: Alert, cooperative, not in acute distress.  Cardiovascular:  Rate Rhythm is regular without murmurs rubs clicks.     Thorax & Lungs: Clear to auscultation, no wheezing, rhonchi, or rales  HENT: Normocephalic, Atraumatic.  Eyes: PERRLA, EOMI, Conjunctiva normal.   Neck: Trachia is midline no swelling of the thyroid.   Lymphatic: No lymphadenopathy noted.   Abdomin: Soft non-tender, no rebound, no guarding.   Skin: Pearly area that's raised at the helix.   Neurologic: Alert & oriented x 3, cranial nerves II through XII are intact, Normal motor function, Normal sensory function, No focal deficits noted.   Psychiatric: Affect normal, Judgment normal, Mood normal.     VITAL SIGNS:/70 mmHg  Pulse 64  Temp(Src) 36.2 °C (97.1 °F)  Resp 14  Ht 1.829 m (6' 0.01\")  Wt 135.626 kg (299 lb)  BMI 40.54 kg/m2  SpO2 95%    Labs: " Reviewed    Assessment:                                                     Plan:    1. Morbid obesity with BMI of 40.0-44.9, adult (CMS-HCC)  Discussed with the patient the importance of diet and exercise  - Patient identified as having weight management issue.  Appropriate orders and counseling given.    2. CML (chronic myelocytic leukemia) (CMS-HCC)  Recheck CBC 3 months  - CBC WITH DIFFERENTIAL; Future    3. Type II diabetes mellitus, well controlled (CMS-HCC)  Stable recheck hemoglobin A1c  - COMP METABOLIC PANEL; Future  - LIPID PROFILE; Future  - HEMOGLOBIN A1C; Future    4. Chronic kidney disease, unspecified stage  Labs ordered  - COMP METABOLIC PANEL; Future    5. Skin change  Referred written for biopsy  - REFERRAL TO INTERNAL MEDICINE

## 2017-05-25 ENCOUNTER — HOSPITAL ENCOUNTER (OUTPATIENT)
Facility: MEDICAL CENTER | Age: 77
End: 2017-05-26
Attending: EMERGENCY MEDICINE | Admitting: HOSPITALIST
Payer: MEDICARE

## 2017-05-25 DIAGNOSIS — E16.2 HYPOGLYCEMIA: ICD-10-CM

## 2017-05-25 LAB
ALBUMIN SERPL BCP-MCNC: 3.9 G/DL (ref 3.2–4.9)
ALBUMIN/GLOB SERPL: 1.6 G/DL
ALP SERPL-CCNC: 57 U/L (ref 30–99)
ALT SERPL-CCNC: 16 U/L (ref 2–50)
ANION GAP SERPL CALC-SCNC: 10 MMOL/L (ref 0–11.9)
AST SERPL-CCNC: 30 U/L (ref 12–45)
BASOPHILS # BLD AUTO: 0.3 % (ref 0–1.8)
BASOPHILS # BLD: 0.02 K/UL (ref 0–0.12)
BILIRUB SERPL-MCNC: 0.4 MG/DL (ref 0.1–1.5)
BUN SERPL-MCNC: 41 MG/DL (ref 8–22)
CALCIUM SERPL-MCNC: 9.1 MG/DL (ref 8.5–10.5)
CHLORIDE SERPL-SCNC: 107 MMOL/L (ref 96–112)
CO2 SERPL-SCNC: 22 MMOL/L (ref 20–33)
CREAT SERPL-MCNC: 2.28 MG/DL (ref 0.5–1.4)
EOSINOPHIL # BLD AUTO: 0.28 K/UL (ref 0–0.51)
EOSINOPHIL NFR BLD: 3.7 % (ref 0–6.9)
ERYTHROCYTE [DISTWIDTH] IN BLOOD BY AUTOMATED COUNT: 48.7 FL (ref 35.9–50)
GFR SERPL CREATININE-BSD FRML MDRD: 28 ML/MIN/1.73 M 2
GLOBULIN SER CALC-MCNC: 2.4 G/DL (ref 1.9–3.5)
GLUCOSE BLD-MCNC: 81 MG/DL (ref 65–99)
GLUCOSE SERPL-MCNC: 94 MG/DL (ref 65–99)
HCT VFR BLD AUTO: 35.7 % (ref 42–52)
HGB BLD-MCNC: 11.9 G/DL (ref 14–18)
IMM GRANULOCYTES # BLD AUTO: 0.03 K/UL (ref 0–0.11)
IMM GRANULOCYTES NFR BLD AUTO: 0.4 % (ref 0–0.9)
LYMPHOCYTES # BLD AUTO: 0.82 K/UL (ref 1–4.8)
LYMPHOCYTES NFR BLD: 10.7 % (ref 22–41)
MCH RBC QN AUTO: 31.6 PG (ref 27–33)
MCHC RBC AUTO-ENTMCNC: 33.3 G/DL (ref 33.7–35.3)
MCV RBC AUTO: 94.7 FL (ref 81.4–97.8)
MONOCYTES # BLD AUTO: 0.53 K/UL (ref 0–0.85)
MONOCYTES NFR BLD AUTO: 6.9 % (ref 0–13.4)
NEUTROPHILS # BLD AUTO: 5.97 K/UL (ref 1.82–7.42)
NEUTROPHILS NFR BLD: 78 % (ref 44–72)
NRBC # BLD AUTO: 0 K/UL
NRBC BLD AUTO-RTO: 0 /100 WBC
PLATELET # BLD AUTO: 114 K/UL (ref 164–446)
PMV BLD AUTO: 11.3 FL (ref 9–12.9)
POTASSIUM SERPL-SCNC: 3.5 MMOL/L (ref 3.6–5.5)
PROT SERPL-MCNC: 6.3 G/DL (ref 6–8.2)
RBC # BLD AUTO: 3.77 M/UL (ref 4.7–6.1)
SODIUM SERPL-SCNC: 139 MMOL/L (ref 135–145)
WBC # BLD AUTO: 7.7 K/UL (ref 4.8–10.8)

## 2017-05-25 PROCEDURE — 84439 ASSAY OF FREE THYROXINE: CPT

## 2017-05-25 PROCEDURE — 80053 COMPREHEN METABOLIC PANEL: CPT

## 2017-05-25 PROCEDURE — 85025 COMPLETE CBC W/AUTO DIFF WBC: CPT

## 2017-05-25 PROCEDURE — 82962 GLUCOSE BLOOD TEST: CPT

## 2017-05-25 PROCEDURE — 84443 ASSAY THYROID STIM HORMONE: CPT

## 2017-05-25 RX ORDER — INSULIN GLARGINE 100 [IU]/ML
25 INJECTION, SOLUTION SUBCUTANEOUS NIGHTLY
Status: ON HOLD | COMMUNITY
End: 2017-05-26

## 2017-05-25 NOTE — IP AVS SNAPSHOT
" Home Care Instructions                                                                                                                  Name:Sukumar Quiñonez  Medical Record Number:[id number]  CSN: [id number]    YOB: 1940   Age: 76 y.o.  Sex: male  HT:1.86 m (6' 1.23\") WT: 138.8 kg (306 lb)          Admit Date: 5/25/2017     Discharge Date:   Today's Date: 5/26/2017  Attending Doctor:  Bryn Calvillo M.D.                  Allergies:  Nkda            Discharge Instructions       Discharge Instructions    Discharged to home by car with relative. Discharged via walking, hospital escort: Yes.  Special equipment needed: Not Applicable    Be sure to schedule a follow-up appointment with your primary care doctor or any specialists as instructed.     Discharge Plan:   Diet Plan: Discussed  Activity Level: Discussed  Confirmed Follow up Appointment: Patient to Call and Schedule Appointment  Confirmed Symptoms Management: Discussed  Medication Reconciliation Updated: Yes  Influenza Vaccine Indication: Not indicated: Previously immunized this influenza season and > 8 years of age    I understand that a diet low in cholesterol, fat, and sodium is recommended for good health. Unless I have been given specific instructions below for another diet, I accept this instruction as my diet prescription.   Other diet:     Special Instructions: None    · Is patient discharged on Warfarin / Coumadin?   No     · Is patient Post Blood Transfusion?  No      Test blood sugars before each meal and keep a log to review with your doctor  Stay hydrated and eat frequent small meals throughout your day  May remove all lines/drains  Take Rx as prescribed  F/u with PCP within 10 days  Call Primary Care MD if new problems arise   Return to ER/hospital for SOB, CP, or worsening/concerning symptoms           Depression / Suicide Risk    As you are discharged from this [facility] facility, it is important to learn how to keep safe from harming " yourself.    Recognize the warning signs:  · Abrupt changes in personality, positive or negative- including increase in energy   · Giving away possessions  · Change in eating patterns- significant weight changes-  positive or negative  · Change in sleeping patterns- unable to sleep or sleeping all the time   · Unwillingness or inability to communicate  · Depression  · Unusual sadness, discouragement and loneliness  · Talk of wanting to die  · Neglect of personal appearance   · Rebelliousness- reckless behavior  · Withdrawal from people/activities they love  · Confusion- inability to concentrate     If you or a loved one observes any of these behaviors or has concerns about self-harm, here's what you can do:  · Talk about it- your feelings and reasons for harming yourself  · Remove any means that you might use to hurt yourself (examples: pills, rope, extension cords, firearm)  · Get professional help from the community (Mental Health, Substance Abuse, psychological counseling)  · Do not be alone:Call your Safe Contact- someone whom you trust who will be there for you.  · Call your local CRISIS HOTLINE 353-3404 or 045-548-4057  · Call your local Children's Mobile Crisis Response Team [place] (012) 990-1933 or www.Bauzaar  · Call the toll free National Suicide Prevention Hotlines   · National Suicide Prevention Lifeline 882-793-CJHS (5579)  · National Hope Line Network 800-SUICIDE (944-0009)    Hypoglycemia  Hypoglycemia occurs when the glucose in your blood is too low. Glucose is a type of sugar that is your body's main energy source. Hormones, such as insulin and glucagon, control the level of glucose in the blood. Insulin lowers blood glucose and glucagon increases blood glucose. Having too much insulin in your blood stream, or not eating enough food containing sugar, can result in hypoglycemia. Hypoglycemia can happen to people with or without diabetes. It can develop quickly and can be a medical  emergency.   CAUSES   · Missing or delaying meals.  · Not eating enough carbohydrates at meals.  · Taking too much diabetes medicine.  · Not timing your oral diabetes medicine or insulin doses with meals, snacks, and exercise.  · Nausea and vomiting.  · Certain medicines.  · Severe illnesses, such as hepatitis, kidney disorders, and certain eating disorders.  · Increased activity or exercise without eating something extra or adjusting medicines.  · Drinking too much alcohol.  · A nerve disorder that affects body functions like your heart rate, blood pressure, and digestion (autonomic neuropathy).  · A condition where the stomach muscles do not function properly (gastroparesis). Therefore, medicines and food may not absorb properly.  · Rarely, a tumor of the pancreas can produce too much insulin.  SYMPTOMS   · Hunger.  · Sweating (diaphoresis).  · Change in body temperature.  · Shakiness.  · Headache.  · Anxiety.  · Lightheadedness.  · Irritability.  · Difficulty concentrating.  · Dry mouth.  · Tingling or numbness in the hands or feet.  · Restless sleep or sleep disturbances.  · Altered speech and coordination.  · Change in mental status.  · Seizures or prolonged convulsions.  · Combativeness.  · Drowsiness (lethargic).  · Weakness.  · Increased heart rate or palpitations.  · Confusion.  · Pale, gray skin color.  · Blurred or double vision.  · Fainting.  DIAGNOSIS   A physical exam and medical history will be performed. Your caregiver may make a diagnosis based on your symptoms. Blood tests and other lab tests may be performed to confirm a diagnosis. Once the diagnosis is made, your caregiver will see if your signs and symptoms go away once your blood glucose is raised.   TREATMENT   Usually, you can easily treat your hypoglycemia when you notice symptoms.  · Check your blood glucose. If it is less than 70 mg/dl, take one of the following:    ¨ 3-4 glucose tablets.    ¨ ½ cup juice.    ¨ ½ cup regular soda.    ¨ 1  cup skim milk.    ¨ ½-1 tube of glucose gel.    ¨ 5-6 hard candies.    · Avoid high-fat drinks or food that may delay a rise in blood glucose levels.  · Do not take more than the recommended amount of sugary foods, drinks, gel, or tablets. Doing so will cause your blood glucose to go too high.    · Wait 10-15 minutes and recheck your blood glucose. If it is still less than 70 mg/dl or below your target range, repeat treatment.    · Eat a snack if it is more than 1 hour until your next meal.    There may be a time when your blood glucose may go so low that you are unable to treat yourself at home when you start to notice symptoms. You may need someone to help you. You may even faint or be unable to swallow. If you cannot treat yourself, someone will need to bring you to the hospital.   HOME CARE INSTRUCTIONS  · If you have diabetes, follow your diabetes management plan by:  ¨ Taking your medicines as directed.  ¨ Following your exercise plan.  ¨ Following your meal plan. Do not skip meals. Eat on time.  ¨ Testing your blood glucose regularly. Check your blood glucose before and after exercise. If you exercise longer or different than usual, be sure to check blood glucose more frequently.  ¨ Wearing your medical alert jewelry that says you have diabetes.  · Identify the cause of your hypoglycemia. Then, develop ways to prevent the recurrence of hypoglycemia.  · Do not take a hot bath or shower right after an insulin shot.  · Always carry treatment with you. Glucose tablets are the easiest to carry.  · If you are going to drink alcohol, drink it only with meals.  · Tell friends or family members ways to keep you safe during a seizure. This may include removing hard or sharp objects from the area or turning you on your side.  · Maintain a healthy weight.  SEEK MEDICAL CARE IF:   · You are having problems keeping your blood glucose in your target range.  · You are having frequent episodes of hypoglycemia.  · You feel you  might be having side effects from your medicines.  · You are not sure why your blood glucose is dropping so low.  · You notice a change in vision or a new problem with your vision.  SEEK IMMEDIATE MEDICAL CARE IF:   · Confusion develops.  · A change in mental status occurs.  · The inability to swallow develops.  · Fainting occurs.     This information is not intended to replace advice given to you by your health care provider. Make sure you discuss any questions you have with your health care provider.     Document Released: 12/18/2006 Document Revised: 12/23/2014 Document Reviewed: 04/15/2013  [employer] Interactive Patient Education ©2016 [employer]      Your appointments     Sep 05, 2017  9:20 AM   Established Patient with Edgardo Castano D.O.   [facility] ([facility])    [address] [place] [address] [address]  [place] [address] [phone]           You will be receiving a confirmation call a few days before your appointment from our automated call confirmation system.              Follow-up Information     1. Schedule an appointment as soon as possible for a visit with Edgardo Castano D.O..    Specialty:  Internal Medicine    Contact information    [address] [place] [address] [address]  [address]  [place] [address] [phone]           Discharge Medication Instructions:    Below are the medications your physician expects you to take upon discharge:    Review all your home medications and newly ordered medications with your doctor and/or pharmacist. Follow medication instructions as directed by your doctor and/or pharmacist.    Please keep your medication list with you and share with your physician.               Medication List      CHANGE how you take these medications        Instructions    Morning Afternoon Evening Bedtime    insulin glargine 100 UNIT/ML Solyifan   What changed:    - how much to take  - when to take this   Commonly known as:  LANTUS        Inject 20 Units as instructed every evening.   Dose:  20 Units                           CONTINUE taking these medications        Instructions    Morning Afternoon Evening Bedtime    benazepril 10 MG Tabs   Last time this was given:  10 mg on 5/26/2017  2:02 AM   Commonly known as:  LOTENSIN        TAKE ONE TABLET BY MOUTH ONCE DAILY                        CRESTOR 5 MG Tabs   Last time this was given:  5 mg on 5/26/2017  2:02 AM   Generic drug:  rosuvastatin        TAKE ONE TABLET BY MOUTH IN THE EVENING                        doxazosin 2 MG Tabs   Last time this was given:  2 mg on 5/26/2017  2:02 AM   Commonly known as:  CARDURA        TAKE TWO TABLETS BY MOUTH ONCE DAILY                        furosemide 40 MG Tabs   Last time this was given:  40 mg on 5/26/2017  8:55 AM   Commonly known as:  LASIX        TAKE ONE TABLET BY MOUTH ONCE DAILY                        GLEEVEC 100 MG Tabs   Generic drug:  imatinib        Take 300 mg by mouth every day.   Dose:  300 mg                        KLOR-CON M20 20 MEQ Tbcr   Last time this was given:  20 mEq on 5/26/2017  2:02 AM   Generic drug:  potassium chloride SA        TAKE ONE TABLET BY MOUTH TWICE DAILY                        ranitidine 150 MG Tabs   Commonly known as:  ZANTAC        TAKE ONE TABLET BY MOUTH TWICE DAILY                             Where to Get Your Medications      Information about where to get these medications is not yet available     ! Ask your nurse or doctor about these medications    - insulin glargine 100 UNIT/ML Soln            Instructions           Diet / Nutrition:    Follow any diet instructions given to you by your doctor or the dietician, including how much salt (sodium) you are allowed each day.    If you are overweight, talk to your doctor about a weight reduction plan.    Activity:    Remain physically active following your doctor's instructions about exercise and activity.    Rest often.     Any time you become even a little tired or short of breath, SIT DOWN and rest.    Worsening Symptoms:    Report  any of the following signs and symptoms to the doctor's office immediately:    *Pain of jaw, arm, or neck  *Chest pain not relieved by medication                               *Dizziness or loss of consciousness  *Difficulty breathing even when at rest   *More tired than usual                                       *Bleeding drainage or swelling of surgical site  *Swelling of feet, ankles, legs or stomach                 *Fever (>100ºF)  *Pink or blood tinged sputum  *Weight gain (3lbs/day or 5lbs /week)           *Shock from internal defibrillator (if applicable)  *Palpitations or irregular heartbeats                *Cool and/or numb extremities    Stroke Awareness    Common Risk Factors for Stroke include:    Age  Atrial Fibrillation  Carotid Artery Stenosis  Diabetes Mellitus  Excessive alcohol consumption  High blood pressure  Overweight   Physical inactivity  Smoking    Warning signs and symptoms of a stroke include:    *Sudden numbness or weakness of the face, arm or leg (especially on one side of the body).  *Sudden confusion, trouble speaking or understanding.  *Sudden trouble seeing in one or both eyes.  *Sudden trouble walking, dizziness, loss of balance or coordination.Sudden severe headache with no known cause.    It is very important to get treatment quickly when a stroke occurs. If you experience any of the above warning signs, call 911 immediately.                   Disclaimer         Quit Smoking / Tobacco Use:    I understand the use of any tobacco products increases my chance of suffering from future heart disease or stroke and could cause other illnesses which may shorten my life. Quitting the use of tobacco products is the single most important thing I can do to improve my health. For further information on smoking / tobacco cessation call a Toll Free Quit Line at 1-197.884.5328 (*National [facility] [place]) or 1-906.807.4681 (American Lung Association) or you can access the web based program at  www.lungusa.org.    [place] Tobacco Users Help Line:  (535) 680-6147       Toll Free: 1-503.682.5625  Quit Tobacco Program [facility] (838)353-1366    Crisis Hotline:    [place] Crisis Hotline:  2-205-ZLYJOXT or 1-802.374.7165    [place] Crisis Hotline:    1-667.141.3218 or 717-072-7374    Discharge Survey:   Thank you for choosing [facility]. We hope we did everything we could to make your hospital stay a pleasant one. You may be receiving a phone survey and we would appreciate your time and participation in answering the questions. Your input is very valuable to us in our efforts to improve our service to our patients and their families.        My signature on this form indicates that:    1. I have reviewed and understand the above information.  2. My questions regarding this information have been answered to my satisfaction.  3. I have formulated a plan with my discharge nurse to obtain my prescribed medications for home.                  Disclaimer         __________________________________                     __________       ________                       Patient Signature                                                 Date                    Time

## 2017-05-26 ENCOUNTER — RESOLUTE PROFESSIONAL BILLING HOSPITAL PROF FEE (OUTPATIENT)
Dept: HOSPITALIST | Facility: MEDICAL CENTER | Age: 77
End: 2017-05-26
Payer: MEDICARE

## 2017-05-26 VITALS
DIASTOLIC BLOOD PRESSURE: 69 MMHG | SYSTOLIC BLOOD PRESSURE: 133 MMHG | WEIGHT: 306 LBS | OXYGEN SATURATION: 96 % | BODY MASS INDEX: 40.56 KG/M2 | RESPIRATION RATE: 15 BRPM | HEIGHT: 73 IN | HEART RATE: 68 BPM | TEMPERATURE: 98 F

## 2017-05-26 PROBLEM — E16.2 HYPOGLYCEMIA: Status: ACTIVE | Noted: 2017-05-26

## 2017-05-26 PROBLEM — E11.9 TYPE 2 DIABETES MELLITUS (HCC): Chronic | Status: ACTIVE | Noted: 2017-05-26

## 2017-05-26 PROBLEM — R79.89 ELEVATED TSH: Status: ACTIVE | Noted: 2017-05-26

## 2017-05-26 LAB
ANION GAP SERPL CALC-SCNC: 6 MMOL/L (ref 0–11.9)
BUN SERPL-MCNC: 40 MG/DL (ref 8–22)
CALCIUM SERPL-MCNC: 8.3 MG/DL (ref 8.5–10.5)
CHLORIDE SERPL-SCNC: 105 MMOL/L (ref 96–112)
CO2 SERPL-SCNC: 22 MMOL/L (ref 20–33)
CREAT SERPL-MCNC: 2.04 MG/DL (ref 0.5–1.4)
GFR SERPL CREATININE-BSD FRML MDRD: 32 ML/MIN/1.73 M 2
GLUCOSE BLD-MCNC: 201 MG/DL (ref 65–99)
GLUCOSE BLD-MCNC: 208 MG/DL (ref 65–99)
GLUCOSE BLD-MCNC: 255 MG/DL (ref 65–99)
GLUCOSE BLD-MCNC: 334 MG/DL (ref 65–99)
GLUCOSE SERPL-MCNC: 325 MG/DL (ref 65–99)
POTASSIUM SERPL-SCNC: 4.3 MMOL/L (ref 3.6–5.5)
SODIUM SERPL-SCNC: 133 MMOL/L (ref 135–145)
T4 FREE SERPL-MCNC: 1.05 NG/DL (ref 0.53–1.43)
TSH SERPL DL<=0.005 MIU/L-ACNC: 4.95 UIU/ML (ref 0.3–3.7)

## 2017-05-26 PROCEDURE — 99236 HOSP IP/OBS SAME DATE HI 85: CPT | Performed by: INTERNAL MEDICINE

## 2017-05-26 PROCEDURE — 99285 EMERGENCY DEPT VISIT HI MDM: CPT

## 2017-05-26 PROCEDURE — 80048 BASIC METABOLIC PNL TOTAL CA: CPT

## 2017-05-26 PROCEDURE — A9270 NON-COVERED ITEM OR SERVICE: HCPCS | Performed by: HOSPITALIST

## 2017-05-26 PROCEDURE — 96372 THER/PROPH/DIAG INJ SC/IM: CPT

## 2017-05-26 PROCEDURE — 700102 HCHG RX REV CODE 250 W/ 637 OVERRIDE(OP): Performed by: HOSPITALIST

## 2017-05-26 PROCEDURE — 82962 GLUCOSE BLOOD TEST: CPT

## 2017-05-26 PROCEDURE — G0378 HOSPITAL OBSERVATION PER HR: HCPCS

## 2017-05-26 PROCEDURE — 36415 COLL VENOUS BLD VENIPUNCTURE: CPT

## 2017-05-26 RX ORDER — AMOXICILLIN 250 MG
2 CAPSULE ORAL 2 TIMES DAILY
Status: DISCONTINUED | OUTPATIENT
Start: 2017-05-26 | End: 2017-05-26 | Stop reason: HOSPADM

## 2017-05-26 RX ORDER — POLYETHYLENE GLYCOL 3350 17 G/17G
1 POWDER, FOR SOLUTION ORAL
Status: DISCONTINUED | OUTPATIENT
Start: 2017-05-26 | End: 2017-05-26 | Stop reason: HOSPADM

## 2017-05-26 RX ORDER — DOXAZOSIN 2 MG/1
4 TABLET ORAL
Status: DISCONTINUED | OUTPATIENT
Start: 2017-05-26 | End: 2017-05-26 | Stop reason: HOSPADM

## 2017-05-26 RX ORDER — IMATINIB MESYLATE 100 MG/1
300 TABLET, FILM COATED ORAL
Status: DISCONTINUED | OUTPATIENT
Start: 2017-05-26 | End: 2017-05-26 | Stop reason: HOSPADM

## 2017-05-26 RX ORDER — ROSUVASTATIN CALCIUM 5 MG/1
5 TABLET, COATED ORAL EVERY EVENING
Status: DISCONTINUED | OUTPATIENT
Start: 2017-05-26 | End: 2017-05-26 | Stop reason: HOSPADM

## 2017-05-26 RX ORDER — ACETAMINOPHEN 325 MG/1
650 TABLET ORAL EVERY 6 HOURS PRN
Status: DISCONTINUED | OUTPATIENT
Start: 2017-05-26 | End: 2017-05-26 | Stop reason: HOSPADM

## 2017-05-26 RX ORDER — FAMOTIDINE 20 MG/1
20 TABLET, FILM COATED ORAL DAILY
Status: DISCONTINUED | OUTPATIENT
Start: 2017-05-26 | End: 2017-05-26 | Stop reason: HOSPADM

## 2017-05-26 RX ORDER — FUROSEMIDE 40 MG/1
40 TABLET ORAL
Status: DISCONTINUED | OUTPATIENT
Start: 2017-05-26 | End: 2017-05-26 | Stop reason: HOSPADM

## 2017-05-26 RX ORDER — BENAZEPRIL HYDROCHLORIDE 10 MG/1
10 TABLET ORAL
Status: DISCONTINUED | OUTPATIENT
Start: 2017-05-26 | End: 2017-05-26

## 2017-05-26 RX ORDER — ONDANSETRON 4 MG/1
4 TABLET, ORALLY DISINTEGRATING ORAL EVERY 4 HOURS PRN
Status: DISCONTINUED | OUTPATIENT
Start: 2017-05-26 | End: 2017-05-26 | Stop reason: HOSPADM

## 2017-05-26 RX ORDER — DEXTROSE MONOHYDRATE 25 G/50ML
25 INJECTION, SOLUTION INTRAVENOUS
Status: DISCONTINUED | OUTPATIENT
Start: 2017-05-26 | End: 2017-05-26 | Stop reason: HOSPADM

## 2017-05-26 RX ORDER — POTASSIUM CHLORIDE 20 MEQ/1
20 TABLET, EXTENDED RELEASE ORAL
Status: DISCONTINUED | OUTPATIENT
Start: 2017-05-26 | End: 2017-05-26 | Stop reason: HOSPADM

## 2017-05-26 RX ORDER — POTASSIUM CHLORIDE 20 MEQ/1
20 TABLET, EXTENDED RELEASE ORAL DAILY
Status: DISCONTINUED | OUTPATIENT
Start: 2017-05-26 | End: 2017-05-26

## 2017-05-26 RX ORDER — BENAZEPRIL HYDROCHLORIDE 20 MG/1
10 TABLET ORAL
Status: DISCONTINUED | OUTPATIENT
Start: 2017-05-26 | End: 2017-05-26 | Stop reason: HOSPADM

## 2017-05-26 RX ORDER — IMATINIB MESYLATE 100 MG/1
300 TABLET, FILM COATED ORAL DAILY
Status: DISCONTINUED | OUTPATIENT
Start: 2017-05-26 | End: 2017-05-26

## 2017-05-26 RX ORDER — INSULIN GLARGINE 100 [IU]/ML
20 INJECTION, SOLUTION SUBCUTANEOUS EVERY EVENING
Qty: 10 ML | Refills: 0
Start: 2017-05-26 | End: 2017-08-22 | Stop reason: SDUPTHER

## 2017-05-26 RX ORDER — ONDANSETRON 2 MG/ML
4 INJECTION INTRAMUSCULAR; INTRAVENOUS EVERY 4 HOURS PRN
Status: DISCONTINUED | OUTPATIENT
Start: 2017-05-26 | End: 2017-05-26 | Stop reason: HOSPADM

## 2017-05-26 RX ORDER — BISACODYL 10 MG
10 SUPPOSITORY, RECTAL RECTAL
Status: DISCONTINUED | OUTPATIENT
Start: 2017-05-26 | End: 2017-05-26 | Stop reason: HOSPADM

## 2017-05-26 RX ADMIN — FAMOTIDINE 20 MG: 20 TABLET, FILM COATED ORAL at 08:55

## 2017-05-26 RX ADMIN — INSULIN LISPRO 2 UNITS: 100 INJECTION, SOLUTION INTRAVENOUS; SUBCUTANEOUS at 12:57

## 2017-05-26 RX ADMIN — POTASSIUM CHLORIDE 20 MEQ: 1500 TABLET, EXTENDED RELEASE ORAL at 02:02

## 2017-05-26 RX ADMIN — FUROSEMIDE 40 MG: 40 TABLET ORAL at 08:55

## 2017-05-26 RX ADMIN — INSULIN LISPRO 3 UNITS: 100 INJECTION, SOLUTION INTRAVENOUS; SUBCUTANEOUS at 06:15

## 2017-05-26 RX ADMIN — BENAZEPRIL HYDROCHLORIDE 10 MG: 20 TABLET, COATED ORAL at 02:02

## 2017-05-26 RX ADMIN — DOXAZOSIN 2 MG: 2 TABLET ORAL at 02:02

## 2017-05-26 RX ADMIN — ROSUVASTATIN CALCIUM 5 MG: 5 TABLET ORAL at 02:02

## 2017-05-26 ASSESSMENT — LIFESTYLE VARIABLES
ALCOHOL_USE: NO
EVER_SMOKED: NEVER

## 2017-05-26 ASSESSMENT — PAIN SCALES - GENERAL
PAINLEVEL_OUTOF10: 0
PAINLEVEL_OUTOF10: 0

## 2017-05-26 NOTE — PROGRESS NOTES
MD visited pt,for discharge, IV D/C'd. Discharge instructions provided to pt. Pt states understanding. Pt states all questions have been answered. Copy of discharge provided to pt. Signed copy in chart. Prescriptions provided to pt, copy in chart. Pt states that all personal belongings are in possession. Pt escorted off unit without incident.explained to patient the importance of monitoring blood sugar specially when skipping meals,explained signs and symptoms of hypoglycemia and having juice or candy immediately.

## 2017-05-26 NOTE — ED NOTES
Med rec complete per pt at bedside with medication list  List returned after interview  Allergies reviewed - NKDA  No ABX in last month

## 2017-05-26 NOTE — ED PROVIDER NOTES
ED Provider Note    CHIEF COMPLAINT  Chief Complaint   Patient presents with   • Low Blood Sugar       HPI  Sukumar Quiñonez is a 76 y.o. male who presents to the emergency department with hypoglycemia. The patient was found unconscious by his family at EMS was called. On scene his blood sugar was 28 and the patient was given D10. In route the patient's blood sugar again dropped to 56 and he is given oral 16 grams of glucose. At this time the patient is asymptomatic. He states he did take his insulin this evening and was given have dinner when his blood sugar dropped. He states this is never happened in the past. Otherwise he has not had any vomiting or diarrhea.    REVIEW OF SYSTEMS  See HPI for further details. All other systems are negative.     PAST MEDICAL HISTORY  Past Medical History   Diagnosis Date   • Murmur, heart    • Heart burn    • Hypertension    • Diabetes 2005     TYPE 2   • Unspecified urinary incontinence    • Heart valve disease    • Indigestion    • Hemorrhoid    • Hyperlipidemia    • Cancer (CMS-HCC)      Prostate; Chronic myleloid lukemia   • Type II diabetes mellitus, well controlled (CMS-HCC) 1/18/2017       SOCIAL HISTORY  Social History     Social History   • Marital Status:      Spouse Name: N/A   • Number of Children: N/A   • Years of Education: N/A     Social History Main Topics   • Smoking status: Never Smoker    • Smokeless tobacco: Never Used   • Alcohol Use: No   • Drug Use: No   • Sexual Activity:     Partners: Female     Other Topics Concern   • Not on file     Social History Narrative           PHYSICAL EXAM  VITAL SIGNS: /64 mmHg  Pulse 65  Temp(Src) 35.9 °C (96.6 °F)  Resp 18  Wt 136.079 kg (300 lb)  SpO2 96%  Constitutional: Well developed, Well nourished, No acute distress, Non-toxic appearance.   HENT: Normocephalic, Atraumatic, tympanic membranes are intact and nonerythematous bilaterally, Oropharynx moist without exudates or erythema, Nose normal.    Eyes: PERRLA, EOMI, Conjunctiva normal.  Neck: Supple without meningismus  Lymphatic: No lymphadenopathy noted.   Cardiovascular: Normal heart rate, Normal rhythm, No murmurs, No rubs, No gallops.   Thorax & Lungs: Normal breath sounds, No respiratory distress, No wheezing, No chest tenderness.   Abdomen: Bowel sounds normal, Soft, No tenderness, no rebound, no guarding, no distention, No masses, No pulsatile masses.   Skin: Warm, Dry, No erythema, No rash.   Back: No tenderness, No CVA tenderness.   Extremities: Atraumatic with symmetric distal pulses, No edema, No tenderness, No cyanosis, No clubbing.   Neurologic: Alert & oriented x 3, cranial nerves II through XII are intact, Normal motor function, Normal sensory function, No focal deficits noted.   Psychiatric: Affect normal, Judgment normal, Mood normal.       COURSE & MEDICAL DECISION MAKING  Pertinent Labs & Imaging studies reviewed. (See chart for details)  This a 76-year-old male who presents after a hypoglycemic episode. The patient has been stable throughout his stay in the emergency department. He does take long-acting Lantus and therefore admitted to the hospital overnight for observation. He has a stable anemia at the blood work as well stable renal insufficiency. There is no acute laboratory changes. The patient's resting comfort with the time of admission.    FINAL IMPRESSION  1. Altered mental status secondary to hypoglycemia  2. Stable anemia  3. Stable renal insufficiency     Disposition  The patient will be admitted in stable condition    Electronically signed by: Otis Christiansen, 5/25/2017 9:54 PM

## 2017-05-26 NOTE — PROGRESS NOTES
Assumed care of pt, pt A&XO4, POWELL, no n/t, or pain. No SOB, BS+, steady on feet, discussed POC, FS at , medicated all night time meds, all needs are met, call light within reach, safety measures in placed. hourly rounding in use.

## 2017-05-26 NOTE — ED NOTES
Pt bib remsa after pt was unconscious, when remsa arrived pts FSBS was 28, pt given d10.  En route to hospital blood sugar dipped down again to 56 and was given 16g of oral glucose.  FSBS 81 upon arrival, awake and alert.

## 2017-05-26 NOTE — DISCHARGE INSTRUCTIONS
Discharge Instructions    Discharged to home by car with relative. Discharged via walking, hospital escort: Yes.  Special equipment needed: Not Applicable    Be sure to schedule a follow-up appointment with your primary care doctor or any specialists as instructed.     Discharge Plan:   Diet Plan: Discussed  Activity Level: Discussed  Confirmed Follow up Appointment: Patient to Call and Schedule Appointment  Confirmed Symptoms Management: Discussed  Medication Reconciliation Updated: Yes  Influenza Vaccine Indication: Not indicated: Previously immunized this influenza season and > 8 years of age    I understand that a diet low in cholesterol, fat, and sodium is recommended for good health. Unless I have been given specific instructions below for another diet, I accept this instruction as my diet prescription.   Other diet:     Special Instructions: None    · Is patient discharged on Warfarin / Coumadin?   No     · Is patient Post Blood Transfusion?  No      Test blood sugars before each meal and keep a log to review with your doctor  Stay hydrated and eat frequent small meals throughout your day  May remove all lines/drains  Take Rx as prescribed  F/u with PCP within 10 days  Call Primary Care MD if new problems arise   Return to ER/hospital for SOB, CP, or worsening/concerning symptoms           Depression / Suicide Risk    As you are discharged from this [facility] facility, it is important to learn how to keep safe from harming yourself.    Recognize the warning signs:  · Abrupt changes in personality, positive or negative- including increase in energy   · Giving away possessions  · Change in eating patterns- significant weight changes-  positive or negative  · Change in sleeping patterns- unable to sleep or sleeping all the time   · Unwillingness or inability to communicate  · Depression  · Unusual sadness, discouragement and loneliness  · Talk of wanting to die  · Neglect of personal  appearance   · Rebelliousness- reckless behavior  · Withdrawal from people/activities they love  · Confusion- inability to concentrate     If you or a loved one observes any of these behaviors or has concerns about self-harm, here's what you can do:  · Talk about it- your feelings and reasons for harming yourself  · Remove any means that you might use to hurt yourself (examples: pills, rope, extension cords, firearm)  · Get professional help from the community (Mental Health, Substance Abuse, psychological counseling)  · Do not be alone:Call your Safe Contact- someone whom you trust who will be there for you.  · Call your local CRISIS HOTLINE 193-9475 or 032-404-9457  · Call your local Children's Mobile Crisis Response Team [place] (586) 640-3198 or www.[employer]  · Call the toll free National Suicide Prevention Hotlines   · National Suicide Prevention Lifeline 209-466-JOUY (4846)  · [employer] Line Network 800-SUICIDE (365-9029)    Hypoglycemia  Hypoglycemia occurs when the glucose in your blood is too low. Glucose is a type of sugar that is your body's main energy source. Hormones, such as insulin and glucagon, control the level of glucose in the blood. Insulin lowers blood glucose and glucagon increases blood glucose. Having too much insulin in your blood stream, or not eating enough food containing sugar, can result in hypoglycemia. Hypoglycemia can happen to people with or without diabetes. It can develop quickly and can be a medical emergency.   CAUSES   · Missing or delaying meals.  · Not eating enough carbohydrates at meals.  · Taking too much diabetes medicine.  · Not timing your oral diabetes medicine or insulin doses with meals, snacks, and exercise.  · Nausea and vomiting.  · Certain medicines.  · Severe illnesses, such as hepatitis, kidney disorders, and certain eating disorders.  · Increased activity or exercise without eating something extra or adjusting medicines.  · Drinking too much  alcohol.  · A nerve disorder that affects body functions like your heart rate, blood pressure, and digestion (autonomic neuropathy).  · A condition where the stomach muscles do not function properly (gastroparesis). Therefore, medicines and food may not absorb properly.  · Rarely, a tumor of the pancreas can produce too much insulin.  SYMPTOMS   · Hunger.  · Sweating (diaphoresis).  · Change in body temperature.  · Shakiness.  · Headache.  · Anxiety.  · Lightheadedness.  · Irritability.  · Difficulty concentrating.  · Dry mouth.  · Tingling or numbness in the hands or feet.  · Restless sleep or sleep disturbances.  · Altered speech and coordination.  · Change in mental status.  · Seizures or prolonged convulsions.  · Combativeness.  · Drowsiness (lethargic).  · Weakness.  · Increased heart rate or palpitations.  · Confusion.  · Pale, gray skin color.  · Blurred or double vision.  · Fainting.  DIAGNOSIS   A physical exam and medical history will be performed. Your caregiver may make a diagnosis based on your symptoms. Blood tests and other lab tests may be performed to confirm a diagnosis. Once the diagnosis is made, your caregiver will see if your signs and symptoms go away once your blood glucose is raised.   TREATMENT   Usually, you can easily treat your hypoglycemia when you notice symptoms.  · Check your blood glucose. If it is less than 70 mg/dl, take one of the following:    ¨ 3-4 glucose tablets.    ¨ ½ cup juice.    ¨ ½ cup regular soda.    ¨ 1 cup skim milk.    ¨ ½-1 tube of glucose gel.    ¨ 5-6 hard candies.    · Avoid high-fat drinks or food that may delay a rise in blood glucose levels.  · Do not take more than the recommended amount of sugary foods, drinks, gel, or tablets. Doing so will cause your blood glucose to go too high.    · Wait 10-15 minutes and recheck your blood glucose. If it is still less than 70 mg/dl or below your target range, repeat treatment.    · Eat a snack if it is more than 1  hour until your next meal.    There may be a time when your blood glucose may go so low that you are unable to treat yourself at home when you start to notice symptoms. You may need someone to help you. You may even faint or be unable to swallow. If you cannot treat yourself, someone will need to bring you to the hospital.   HOME CARE INSTRUCTIONS  · If you have diabetes, follow your diabetes management plan by:  ¨ Taking your medicines as directed.  ¨ Following your exercise plan.  ¨ Following your meal plan. Do not skip meals. Eat on time.  ¨ Testing your blood glucose regularly. Check your blood glucose before and after exercise. If you exercise longer or different than usual, be sure to check blood glucose more frequently.  ¨ Wearing your medical alert jewelry that says you have diabetes.  · Identify the cause of your hypoglycemia. Then, develop ways to prevent the recurrence of hypoglycemia.  · Do not take a hot bath or shower right after an insulin shot.  · Always carry treatment with you. Glucose tablets are the easiest to carry.  · If you are going to drink alcohol, drink it only with meals.  · Tell friends or family members ways to keep you safe during a seizure. This may include removing hard or sharp objects from the area or turning you on your side.  · Maintain a healthy weight.  SEEK MEDICAL CARE IF:   · You are having problems keeping your blood glucose in your target range.  · You are having frequent episodes of hypoglycemia.  · You feel you might be having side effects from your medicines.  · You are not sure why your blood glucose is dropping so low.  · You notice a change in vision or a new problem with your vision.  SEEK IMMEDIATE MEDICAL CARE IF:   · Confusion develops.  · A change in mental status occurs.  · The inability to swallow develops.  · Fainting occurs.     This information is not intended to replace advice given to you by your health care provider. Make sure you discuss any questions  you have with your health care provider.     Document Released: 12/18/2006 Document Revised: 12/23/2014 Document Reviewed: 04/15/2013  Elsevier Interactive Patient Education ©2016 [employer]

## 2017-05-26 NOTE — PROGRESS NOTES
Pt in bed a&ox4,v/s wnl,no c/o pain,pt stated he feels his BS down,wants his sugar checked,FSBS done,poc explained,pt not hypoglycemic.

## 2017-05-26 NOTE — H&P
DATE OF ADMISSION:  05/25/2017    PRIMARY CARE PHYSICIAN:  Edgardo Castano DO    OUTPATIENT NEPHROLOGIST:  Albaro Crowder MD, [facility].    OUTPATIENT CARDIOLOGIST:  Ez Huang MD    OUTPATIENT UROLOGIST:  Brennan Cole MD    OUTPATIENT ONCOLOGIST:  Eagle Velez MD    CHIEF COMPLAINT:  Altered mental status this evening.    HISTORY OF PRESENT ILLNESS:  This is a 76-year-old male diabetic, insulin   requiring; he has also got a history of CML in the past as well as prostate   cancer, cardiac murmur and chronic kidney disease.  His blood workup was done   in the morning.  He had skipped breakfast, he ate lunch and then this evening   at about 6 o'clock, he may to give himself his evening Lantus dosing and then   an hour later, he was fixing dinner, getting ready to eat, his wife was nearby   and watching him, she suddenly noted hold himself trying to keep upright   and he started slumping down, She helped him ease himself down to the ground, she   denied any trauma, she did not fall down, but states she could not hold him   up, he was down on the floor fairly unresponsive, but was moaning.  She denied   any syncopal episodes where he was not responsive to them, paramedics were   called, found in the field to have a blood sugar of 28.  Patient was given   some D50 in amp, did not have any significant improvement of his blood sugars,   was becoming more responsive, he then was given more sugars and had   improvement, he was brought here to the emergency room.  Patient is unsure why   he had hypoglycemic event, he has not had any hypoglycemic events like this   in the past, he did state he had a good lunch, he checked his blood sugars   this morning is in the 150s, which is actually a little bit higher for him, he   normally runs in the 80s to 100s.  Back earlier in May, he had a hemoglobin   A1c, he is still out of control with his not controlled of his blood sugars as   his A1c was 7.9.  Patient denies  any oral medications, no changes to his   insulin dosing, states he normally gives himself insulin every day and he is   quite used to the routine, family members deny any concerns of dementia or   forgetfulness, patient denies given himself any additional shots, he denies   any suicidal ideations or depression.    REVIEW OF SYSTEMS:  Patient denies any fevers, chills, no nausea, denies any   diarrhea, no constipation, no dysuria, patient denies any headaches, no   problem swallowing, he has not had any had weight gain, but no   weight loss over the last 3 months.  Patient denies any lack of motion or   ability to move his arms or legs, no sensory deficits.  All other review of   systems is unremarkable per CMS/AMA criteria.    ALLERGIES:  None known.    CURRENT MEDICATIONS:  1.  Benazepril 10 mg daily.  2.  Crestor 5 mg in the evening.  3.  Doxazosin 4 mg every day.  4.  Lasix 40 mg daily.  5.  Gleevec 300 mg daily for his CML.  6.  Lantus 25 units every evening.  7.  Potassium chloride 20 mEq twice a day.  8.  Zantac 150 mg twice a day.    PAST MEDICAL HISTORY:  1.  He has chronic kidney disease with baseline creatinine of about 1.7-1.8.  2.  He has had diabetes mellitus type 2, insulin requiring.  3.  Prostate cancer.  4.  Hypertension.  5.  History of cardiac murmur.  6.  CML (chronic myelogenous leukemia).    PAST SURGICAL HISTORY:  He has had brachytherapy in .  He has had adult   circumcision in .  He has had transurethral resection of the prostate.  He   has had umbilical hernia repair in , venous stripping in , sphincter   prosthesis placement in  by Dr. Cole, partial colon resection in  by   Dr. Sargent.  He had a MARCY by Dr. Valencia in .    SOCIAL HISTORY:  He is .  He has had 3 children.  Youngest daughter is   at bedside.  He is a nonsmoker, nondrinker.  He is retired, used to be a heavy   .    FAMILY HISTORY:  Mother and father are both ,  mother lived ripe old   age at 94.  Father  of a brain aneurysm in early 80s.    PHYSICAL EXAMINATION:  VITAL SIGNS:  Temperature is 96.6, heart rate of 65, respirations 14, blood   pressure 129/68, oxygen 96%.  GENERAL:  This is an overweight male.  He is awake, alert and conversant.    Speech is clear.  He is able to talk in full sentences, oriented x4.  HEENT:  NCAT, EOMI, PERRL.  Sclerae anicteric.  Nares are patent.  Moist   mucosa.  NECK:  Trachea is midline.  No adenopathy.  LUNGS:  Clear to auscultation bilaterally.  No wheezes.  No crackles.  No   accessory muscle use.  CARDIOVASCULAR:  Regular rate and rhythm.  No gallops or rubs.  He has a   murmur at the left 5th intercostal space closest to the mediastinum.  Grade   III/VI holosystolic.  ABDOMEN:  Obese, soft, nontender, nondistended.  Positive bowel sounds.  EXTREMITIES:  He has trace lower extremity edema.  He has 2+ posterior   tibialis pulses bilaterally.  He has 2+ radial artery pulses bilaterally.  No   clubbing or cyanosis of the digits.  No tremors noted.  He is able to move all   extremities.  Good fine motor control.  NEUROLOGIC:  Cranial nerves II through XII grossly intact.    LABORATORY DATA:  CBC shows white count 7.7, hemoglobin 11.9, hematocrit 35.7,   platelet count is 114.  Comprehensive metabolic panel is remarkable for   potassium of 3.5, chloride 107, BUN of 41, creatinine is 2.28.    ASSESSMENT AND PLAN:  1.  Hypoglycemia, uncertain etiology, he denies any overt use of Lantus.    Question if his lack of eating breakfast had any complications to do with   this, he states he is fine and ate a fairly large lunch.  At this point in   time, monitor fingersticks, continue with diabetic diet.  We will have Lantus   continued.  See signs of infection for causing his hypoglycemia, observation   overnight, fluid hydration.  2.  History of chronic myelogenous leukemia.  Continue with his outpatient   chemotherapy medication.  3.   Obesity.  4.  Probable obstructive sleep apnea.  5.  Normocytic normochromic anemia.  6.  Hypokalemia, replace potassium.  7.  Acute on chronic renal failure.  Patient follows up with [facility]   giving gentle fluids, monitor BMP in a.m.  8.  History of prostate cancer.  Follows up with Dr. Cole.  9.  History of cardiac murmur.  Follows up with Dr. Ez Huang.       ____________________________________     DO YARA KASPER / ADEEL    DD:  05/26/2017 03:35:45  DT:  05/26/2017 04:40:27    D#:  [id number]  Job#:  [id number]

## 2017-05-27 NOTE — DISCHARGE SUMMARY
"DATE OF ADMISSION:  05/25/2017    DATE OF DISCHARGE:  05/26/2017    PRIMARY CARE PROVIDER:  Edgardo Castano DO    OUTPATIENT NEPHROLOGIST:  Albaro Crowder MD    OUTPATIENT CARDIOLOGIST:  Ez Huang MD    OUTPATIENT UROLOGIST:  Brennan Cole MD    OUTPATIENT ONCOLOGIST:  Eagle Velez MD    CONSULTS:  None.    PROCEDURES:  None.    DISCHARGE DIAGNOSES:  Hypoglycemia, elevated TSH with followup T4 within   normal limits.    CHRONIC MEDICAL PROBLEMS:  Type 2 diabetes mellitus, CML, dyslipidemia,   hypertension.    RESULTS REVIEW:  Interval history/exam for day of discharge:    Filed Vitals:    05/26/17 0124 05/26/17 0126 05/26/17 0800 05/26/17 1158   BP:  150/73 124/66 133/69   Pulse:  70 64 68   Temp:  36.1 °C (97 °F) 36.8 °C (98.3 °F) 36.7 °C (98 °F)   Resp:  15 16 15   Height: 1.86 m (6' 1.23\")      Weight: 138.8 kg (306 lb)      SpO2:  95% 95% 96%     Weight/BMI: Body mass index is 40.12 kg/(m^2).  Pulse Oximetry: 96 %, O2 Delivery: None (Room Air)      Most Recent Labs:    Lab Results   Component Value Date/Time    WBC 7.7 05/25/2017 09:20 PM    RBC 3.77* 05/25/2017 09:20 PM    HEMOGLOBIN 11.9* 05/25/2017 09:20 PM    HEMATOCRIT 35.7* 05/25/2017 09:20 PM    MCV 94.7 05/25/2017 09:20 PM    MCH 31.6 05/25/2017 09:20 PM    MCHC 33.3* 05/25/2017 09:20 PM    MPV 11.3 05/25/2017 09:20 PM    NEUTROPHILS-POLYS 78.00* 05/25/2017 09:20 PM    LYMPHOCYTES 10.70* 05/25/2017 09:20 PM    MONOCYTES 6.90 05/25/2017 09:20 PM    EOSINOPHILS 3.70 05/25/2017 09:20 PM    BASOPHILS 0.30 05/25/2017 09:20 PM    HYPOCHROMIA 1+ 11/05/2010 08:07 AM      Lab Results   Component Value Date/Time    SODIUM 133* 05/26/2017 04:15 AM    POTASSIUM 4.3 05/26/2017 04:15 AM    CHLORIDE 105 05/26/2017 04:15 AM    CO2 22 05/26/2017 04:15 AM    GLUCOSE 325* 05/26/2017 04:15 AM    BUN 40* 05/26/2017 04:15 AM    CREATININE 2.04* 05/26/2017 04:15 AM      Lab Results   Component Value Date/Time    ALT(SGPT) 16 05/25/2017 09:20 PM    AST(SGOT) 30 " 05/25/2017 09:20 PM    ALKALINE PHOSPHATASE 57 05/25/2017 09:20 PM    TOTAL BILIRUBIN 0.4 05/25/2017 09:20 PM    ALBUMIN 3.9 05/25/2017 09:20 PM    GLOBULIN 2.4 05/25/2017 09:20 PM    INR 0.89 09/23/2013 09:22 AM     Lab Results   Component Value Date/Time    PT 12.3 09/23/2013 09:22 AM    INR 0.89 09/23/2013 09:22 AM        Imaging/ Testing:      No orders to display             MEDICATIONS:     Medication List      CHANGE how you take these medications       Instructions    insulin glargine 100 UNIT/ML Soln   What changed:    - how much to take  - when to take this   Commonly known as:  LANTUS    Inject 20 Units as instructed every evening.   Dose:  20 Units         CONTINUE taking these medications       Instructions    benazepril 10 MG Tabs   Last time this was given:  10 mg on 5/26/2017  2:02 AM   Commonly known as:  LOTENSIN    TAKE ONE TABLET BY MOUTH ONCE DAILY       CRESTOR 5 MG Tabs   Last time this was given:  5 mg on 5/26/2017  2:02 AM   Generic drug:  rosuvastatin    TAKE ONE TABLET BY MOUTH IN THE EVENING       doxazosin 2 MG Tabs   Last time this was given:  2 mg on 5/26/2017  2:02 AM   Commonly known as:  CARDURA    TAKE TWO TABLETS BY MOUTH ONCE DAILY       furosemide 40 MG Tabs   Last time this was given:  40 mg on 5/26/2017  8:55 AM   Commonly known as:  LASIX    TAKE ONE TABLET BY MOUTH ONCE DAILY       GLEEVEC 100 MG Tabs   Generic drug:  imatinib    Take 300 mg by mouth every day.   Dose:  300 mg       KLOR-CON M20 20 MEQ Tbcr   Last time this was given:  20 mEq on 5/26/2017  2:02 AM   Generic drug:  potassium chloride SA    TAKE ONE TABLET BY MOUTH TWICE DAILY       ranitidine 150 MG Tabs   Commonly known as:  ZANTAC    TAKE ONE TABLET BY MOUTH TWICE DAILY               HOSPITAL COURSE AND DECISION MAKING:  Please see H and P as dictated by Dr. Brennan Bowser on May 26th.  In brief, this is a 76-year-old diabetic, insulin   requiring, history of CML, prostate cancer, and chronic kidney disease  patient   that I reportedly gave himself his evening Lantus dosing, an hour later was   fixing dinner ready to eat and he had a difficult time holding himself up ____   down.  Paramedics found blood sugar of 28 and he responded with D50.  His   blood sugar has recovered significantly and remained stable down to 208 and   his hemoglobin A1c is steady as well at 7.9 most recently this month.  I am   not exactly clear as to why his blood sugar dropped the way it did as the   Lantus should not have acted that quickly; however, etiology may be that he   did not have enough to eat that day and otherwise may be uncertain.  He will   continue on a slightly lower dose of Lantus at 20 units nightly and we advised   him to take his blood sugars more frequently throughout the day, primarily   before meals and he will follow up with his primary care provider, Dr. Castano.  We have also advised him that he has an elevated TSH, but a normal   free T4, and we are recommending that he just have this rechecked in   approximately 1 month through his primary care.  He is otherwise stable and   had no other acute findings or complications during his admission.  He is   discharged in stable condition, may resume activity as tolerated and resume   his diabetic diet.    Instructions:      The patient was instructed to return to the ER in the event of worsening symptoms. I have counseled the patient on the importance of compliance and the patient has agreed to proceed with all medical recommendations and follow up plan indicated above.   The patient understands that all medications come with benefits and risks. Risks may include permanent injury or death and these risks can be minimized with close reassessment and monitoring.        Opioid prescription history checked: no    Time spent in coordination of discharge was 35 minutes. This included face to face with the patient, medication reconciliation, care co ordination with RN involved  in patient care and discussion and co ordination with case management.          ____________________________________     MARCIO Lockhart    DD:  05/26/2017 14:44:08  DT:  05/27/2017 03:23:20    D#:  [id number]  Job#:  [id number]

## 2017-05-28 ENCOUNTER — PATIENT OUTREACH (OUTPATIENT)
Dept: HEALTH INFORMATION MANAGEMENT | Facility: OTHER | Age: 77
End: 2017-05-28

## 2017-06-02 ENCOUNTER — OFFICE VISIT (OUTPATIENT)
Dept: MEDICAL GROUP | Facility: CLINIC | Age: 77
End: 2017-06-02
Payer: MEDICARE

## 2017-06-02 VITALS
OXYGEN SATURATION: 97 % | HEART RATE: 60 BPM | RESPIRATION RATE: 18 BRPM | HEIGHT: 73 IN | SYSTOLIC BLOOD PRESSURE: 120 MMHG | WEIGHT: 298 LBS | DIASTOLIC BLOOD PRESSURE: 72 MMHG | BODY MASS INDEX: 39.49 KG/M2 | TEMPERATURE: 99.1 F

## 2017-06-02 DIAGNOSIS — E11.9 TYPE II DIABETES MELLITUS, WELL CONTROLLED (HCC): ICD-10-CM

## 2017-06-02 DIAGNOSIS — R79.89 ELEVATED TSH: ICD-10-CM

## 2017-06-02 DIAGNOSIS — C92.10 CML (CHRONIC MYELOCYTIC LEUKEMIA) (HCC): ICD-10-CM

## 2017-06-02 PROCEDURE — 1101F PT FALLS ASSESS-DOCD LE1/YR: CPT | Performed by: INTERNAL MEDICINE

## 2017-06-02 PROCEDURE — 1036F TOBACCO NON-USER: CPT | Performed by: INTERNAL MEDICINE

## 2017-06-02 PROCEDURE — 99214 OFFICE O/P EST MOD 30 MIN: CPT | Performed by: INTERNAL MEDICINE

## 2017-06-02 PROCEDURE — G8419 CALC BMI OUT NRM PARAM NOF/U: HCPCS | Performed by: INTERNAL MEDICINE

## 2017-06-02 PROCEDURE — G8432 DEP SCR NOT DOC, RNG: HCPCS | Performed by: INTERNAL MEDICINE

## 2017-06-02 PROCEDURE — 4040F PNEUMOC VAC/ADMIN/RCVD: CPT | Performed by: INTERNAL MEDICINE

## 2017-06-03 NOTE — PROGRESS NOTES
CC: Sukumar Quiñonez is a 76 y.o. male is suffering from   Chief Complaint   Patient presents with   • Hospital Follow-up     Low Blood Sugar          SUBJECTIVE:  1. Type II diabetes mellitus, well controlled (CMS-HCC)  Ez is here for follow-up, has a history of type 2 diabetes historically reasonably controlled, unfortunately ran into a situation where he suddenly dropped approximately 28 for blood sugar lost consciousness was treated by EMTs with an amp of D50 and then an oral glucose gel.  Patient was evaluated in the hospital then released with recommendation of decreasing Lantus insulin from 25 units to 20.  In talking with the patient he had skipped breakfast that morning, had a fairly sizable lunch, then injected himself just before dinner then lost consciousness.  The question is whether or not the patient had this going to highly vascular area where rapid absorption occurred or where he had completed his carbohydrate stores for the day and had a inadequate gluconeogenesis.     2. Elevated TSH  Elevated TSH given this was done in the hospital under high stress situation of recommended we recheck in one month.     3. CML (chronic myelocytic leukemia) (CMS-HCC)  Patient is on Gleevec for chronic myelogenous leukemia, needs to be on Lasix which unfortunately exacerbates his renal failure. Patient needs to discuss this along with the daughter with Dr. Velez his oncologist and Dr. Dobbs his nephrologist.         Past social, family, history:   Social History   Substance Use Topics   • Smoking status: Never Smoker    • Smokeless tobacco: Never Used   • Alcohol Use: No         MEDICATIONS:    Current outpatient prescriptions:   •  insulin glargine (LANTUS) 100 UNIT/ML Solution, Inject 20 Units as instructed every evening., Disp: 10 mL, Rfl: 0  •  ranitidine (ZANTAC) 150 MG Tab, TAKE ONE TABLET BY MOUTH TWICE DAILY, Disp: 180 Tab, Rfl: 3  •  furosemide (LASIX) 40 MG Tab, TAKE ONE TABLET BY MOUTH ONCE  "DAILY, Disp: 90 Tab, Rfl: 3  •  benazepril (LOTENSIN) 10 MG Tab, TAKE ONE TABLET BY MOUTH ONCE DAILY, Disp: 90 Tab, Rfl: 3  •  doxazosin (CARDURA) 2 MG Tab, TAKE TWO TABLETS BY MOUTH ONCE DAILY, Disp: 180 Tab, Rfl: 3  •  CRESTOR 5 MG Tab, TAKE ONE TABLET BY MOUTH IN THE EVENING, Disp: 90 Tab, Rfl: 3  •  KLOR-CON M20 20 MEQ Tab CR, TAKE ONE TABLET BY MOUTH TWICE DAILY, Disp: 180 Tab, Rfl: 3  •  imatinib (GLEEVEC) 100 MG TABS, Take 300 mg by mouth every day., Disp: , Rfl:     PROBLEMS:  Patient Active Problem List    Diagnosis Date Noted   • Hypoglycemia 05/26/2017   • Elevated TSH 05/26/2017   • Type 2 diabetes mellitus (CMS-HCC) 05/26/2017   • Morbid obesity with BMI of 40.0-44.9, adult ([facility]) 05/08/2017   • Type II diabetes mellitus, well controlled (CMS-HCC) 01/18/2017   • HTN (hypertension) 01/27/2012   • Dyslipidemia 01/27/2012   • Aortic valve insufficiency 08/25/2011   • CML (chronic myelocytic leukemia) (CMS-HCC) 08/25/2011       REVIEW OF SYSTEMS:  Gen.:  No Nausea, Vomiting, fever, Chills.  Heart: No chest pain.  Lungs:  No shortness of Breath.  Psychological: Carlos unusual Anxiety depression     PHYSICAL EXAM   Constitutional: Alert, cooperative, not in acute distress.  Cardiovascular:  Rate Rhythm is regular without murmurs rubs clicks.     Thorax & Lungs: Clear to auscultation, no wheezing, rhonchi, or rales  HENT: Normocephalic, Atraumatic.  Eyes: PERRLA, EOMI, Conjunctiva normal.   Neck: Trachia is midline no swelling of the thyroid.   Lymphatic: No lymphadenopathy noted.   Neurologic: Alert & oriented x 3, cranial nerves II through XII are intact, Normal motor function, Normal sensory function, No focal deficits noted.   Psychiatric: Affect normal, Judgment normal, Mood normal.     VITAL SIGNS:/72 mmHg  Pulse 60  Temp(Src) 37.3 °C (99.1 °F)  Resp 18  Ht 1.854 m (6' 1\")  Wt 135.172 kg (298 lb)  BMI 39.32 kg/m2  SpO2 97%    Labs: Reviewed    Assessment:                                        "              Plan:    1. Type II diabetes mellitus, well controlled (CMS-[facility])  Type 2 diabetes continue to monitor    2. Elevated TSH  Recheck one month thyroid, Prinzmetal both panel TPO antibodies  - TSH; Future  - FREE THYROXINE; Future  - COMP METABOLIC PANEL; Future  - CBC WITH DIFFERENTIAL; Future  - THYROID PEROXIDASE  (TPO) AB; Future    3. CML (chronic myelocytic leukemia) (CMS-HCC)  Discussed with oncology possibly changing from Gleevec to other medication that does not require the use of Lasix

## 2017-07-26 ENCOUNTER — APPOINTMENT (OUTPATIENT)
Dept: RADIOLOGY | Facility: IMAGING CENTER | Age: 77
End: 2017-07-26
Attending: PHYSICIAN ASSISTANT
Payer: MEDICARE

## 2017-07-26 ENCOUNTER — OFFICE VISIT (OUTPATIENT)
Dept: URGENT CARE | Facility: PHYSICIAN GROUP | Age: 77
End: 2017-07-26
Payer: MEDICARE

## 2017-07-26 VITALS
RESPIRATION RATE: 16 BRPM | OXYGEN SATURATION: 92 % | HEIGHT: 73 IN | HEART RATE: 71 BPM | WEIGHT: 306 LBS | DIASTOLIC BLOOD PRESSURE: 68 MMHG | SYSTOLIC BLOOD PRESSURE: 104 MMHG | TEMPERATURE: 98.8 F | BODY MASS INDEX: 40.56 KG/M2

## 2017-07-26 DIAGNOSIS — M25.562 ACUTE PAIN OF LEFT KNEE: ICD-10-CM

## 2017-07-26 DIAGNOSIS — M17.12 PRIMARY OSTEOARTHRITIS OF LEFT KNEE: ICD-10-CM

## 2017-07-26 PROCEDURE — 99213 OFFICE O/P EST LOW 20 MIN: CPT | Performed by: PHYSICIAN ASSISTANT

## 2017-07-26 PROCEDURE — 73562 X-RAY EXAM OF KNEE 3: CPT | Mod: TC,LT | Performed by: PHYSICIAN ASSISTANT

## 2017-07-26 ASSESSMENT — ENCOUNTER SYMPTOMS
RESPIRATORY NEGATIVE: 1
CONSTITUTIONAL NEGATIVE: 1
HEADACHES: 0
TINGLING: 0
BRUISES/BLEEDS EASILY: 0
CARDIOVASCULAR NEGATIVE: 1

## 2017-07-26 NOTE — MR AVS SNAPSHOT
"        Sukumar Mode Page   2017 10:10 AM   Office Visit   MRN: [id number]    Department:  [facility]   Dept Phone:  [phone]    Description:  Male : 1940   Provider:  Chelsi Hernandez PA-C           Reason for Visit     Knee Pain C/o LT knee pain x3 days.  PT states it was sore before going to bed a few nights ago, and getting worse.  No noticable event to account for the pain.      Allergies as of 2017     Allergen Noted Reactions    Nkda [No Known Drug Allergy] 2010         You were diagnosed with     Acute pain of left knee   [[id number]]         Vital Signs     Blood Pressure Pulse Temperature Respirations Height Weight    104/68 mmHg 71 37.1 °C (98.8 °F) 16 1.854 m (6' 0.99\") 138.801 kg (306 lb)    Body Mass Index Oxygen Saturation Smoking Status             40.38 kg/m2 92% Never Smoker          Basic Information     Date Of Birth Sex Race Ethnicity Preferred Language    1940 Male White Non- English      Your appointments     Sep 05, 2017  9:20 AM   Established Patient with Edgardo Castano D.O.   [facility] ([facility])    [address] [place] [address] [address] [place] [id number] [phone]           You will be receiving a confirmation call a few days before your appointment from our automated call confirmation system.              Problem List              ICD-10-CM Priority Class Noted - Resolved    Aortic valve insufficiency I35.1   2011 - Present    CML (chronic myelocytic leukemia) (CMS-HCC) (Chronic) C92.10   2011 - Present    HTN (hypertension) (Chronic) I10   2012 - Present    Dyslipidemia (Chronic) E78.5   2012 - Present    Type II diabetes mellitus, well controlled (CMS-[facility]) E11.9   2017 - Present    Morbid obesity with BMI of 40.0-44.9, adult ([facility]) E66.01, Z68.41   2017 - Present    Hypoglycemia E16.2   2017 - Present    Elevated TSH R94.6   2017 - Present    Type 2 diabetes mellitus (CMS-[facility]) (Chronic) " E11.9   5/26/2017 - Present      Health Maintenance        Date Due Completion Dates    IMM ZOSTER VACCINE 10/30/1999 ---    IMM INFLUENZA (1) 9/1/2017 10/11/2016, 10/7/2015, 9/27/2014    DIABETES MONOFILAMENT / LE EXAM 10/11/2017 10/11/2016    A1C SCREENING 11/9/2017 5/9/2017, 12/19/2016, 8/15/2016, 6/27/2016, 4/1/2016, 3/2/2015, 12/3/2014, 6/25/2014, 12/2/2013, 8/6/2013, 5/6/2013, 12/11/2012, 9/20/2012, 3/26/2012, 9/19/2011    RETINAL SCREENING 3/7/2018 3/7/2017, 10/1/2015, 1/24/2014    FASTING LIPID PROFILE 5/9/2018 5/9/2017, 12/19/2016, 2/4/2014, 6/10/2013, 5/6/2013, 12/11/2012, 9/20/2012    URINE ACR / MICROALBUMIN 5/9/2018 5/9/2017, 12/19/2016, 12/19/2016, 8/15/2016, 8/15/2016, 2/2/2016, 10/1/2015, 6/16/2015, 3/2/2015, 9/2/2014, 6/25/2014, 5/15/2014, 2/4/2014, 12/2/2013, 11/4/2013, 6/10/2013, 2/15/2013, 12/11/2012, 9/20/2012, 9/20/2012, 3/26/2012, 9/19/2011    SERUM CREATININE 5/26/2018 5/26/2017, 5/25/2017, 5/9/2017, 5/9/2017, 12/19/2016, 12/19/2016, 9/6/2016, 8/15/2016, 8/15/2016, 7/14/2016, 6/27/2016, 6/27/2016, 4/1/2016, 2/2/2016, 10/1/2015, 6/16/2015, 6/16/2015, 3/2/2015, 3/2/2015, 12/3/2014, 9/2/2014, 8/4/2014, 5/15/2014, 2/4/2014, 1/17/2014, 11/4/2013, 6/10/2013, 6/10/2013, 5/6/2013, 2/15/2013, 12/26/2012, 11/9/2012, 7/5/2012, 4/26/2012, 3/6/2012, 2/28/2012, 2/21/2012, 2/10/2012, 2/3/2012, 2/1/2012, 1/31/2012, 1/29/2012, 1/28/2012, 1/27/2012, 1/26/2012, 1/6/2012, 9/1/2011, 8/31/2011, 8/30/2011, 8/19/2011, 7/5/2011, 4/8/2011, 11/4/2010, 1/25/2010, 8/18/2009, 8/17/2009, 8/11/2009    COLONOSCOPY 12/9/2018 12/9/2015, 12/7/2012    IMM DTaP/Tdap/Td Vaccine (2 - Td) 7/2/2026 7/2/2016            Current Immunizations     13-VALENT PCV PREVNAR 1/12/2016    Influenza Vaccine 10/1/2010    Influenza Vaccine Adult HD 10/11/2016  4:00 PM, 9/27/2014    Influenza Vaccine Quad Inj (Preserved) 10/7/2015    Pneumococcal Vaccine (UF)Historical Data 10/1/2006    Pneumococcal polysaccharide vaccine (PPSV-23) 1/18/2017     Tdap Vaccine 7/2/2016 11:34 AM      Below and/or attached are the medications your provider expects you to take. Review all of your home medications and newly ordered medications with your provider and/or pharmacist. Follow medication instructions as directed by your provider and/or pharmacist. Please keep your medication list with you and share with your provider. Update the information when medications are discontinued, doses are changed, or new medications (including over-the-counter products) are added; and carry medication information at all times in the event of emergency situations     Allergies:  NKDA - (reactions not documented)               Medications  Valid as of: July 26, 2017 - 11:16 AM    Generic Name Brand Name Tablet Size Instructions for use    Benazepril HCl (Tab) LOTENSIN 10 MG TAKE ONE TABLET BY MOUTH ONCE DAILY        Doxazosin Mesylate (Tab) CARDURA 2 MG TAKE TWO TABLETS BY MOUTH ONCE DAILY        Furosemide (Tab) LASIX 40 MG TAKE ONE TABLET BY MOUTH ONCE DAILY        Imatinib Mesylate (Tab) GLEEVEC 100 MG Take 300 mg by mouth every day.        Insulin Glargine (Solution) LANTUS 100 UNIT/ML Inject 20 Units as instructed every evening.        Potassium Chloride Anabelle CR (Tab CR) KLOR-CON M20 20 MEQ TAKE ONE TABLET BY MOUTH TWICE DAILY        RaNITidine HCl (Tab) ZANTAC 150 MG TAKE ONE TABLET BY MOUTH TWICE DAILY        Rosuvastatin Calcium (Tab) CRESTOR 5 MG TAKE ONE TABLET BY MOUTH IN THE EVENING        .                 Medicines prescribed today were sent to:     [facility] [address] [place] [id number]    Phone: [phone] Fax: [phone]    Open 24 Hours?: No      Medication refill instructions:       If your prescription bottle indicates you have medication refills left, it is not necessary to call your provider’s office. Please contact your pharmacy and they will refill your medication.    If your prescription bottle  indicates you do not have any refills left, you may request refills at any time through one of the following ways: The online [facility] system (except Urgent Care), by calling your provider’s office, or by asking your pharmacy to contact your provider’s office with a refill request. Medication refills are processed only during regular business hours and may not be available until the next business day. Your provider may request additional information or to have a follow-up visit with you prior to refilling your medication.   *Please Note: Medication refills are assigned a new Rx number when refilled electronically. Your pharmacy may indicate that no refills were authorized even though a new prescription for the same medication is available at the pharmacy. Please request the medicine by name with the pharmacy before contacting your provider for a refill.        Your To Do List     Future Labs/Procedures Complete By Expires    DX-KNEE 3 VIEWS LEFT  As directed 7/26/2018      Other Notes About Your Plan     per Nephrology visit of 6/29/15-patient has proteinuria secondary to diabetic nephropathy. Code 250.40           [facility] Status: Patient Declined

## 2017-07-26 NOTE — PROGRESS NOTES
"Subjective:      Sukumar Quiñonez is a 76 y.o. male who presents with Knee Pain        Knee Pain  Pertinent negatives include no headaches or rash.   Patient presents for 3 days of left knee pain.   Pain is not present in the mornings but throughout the day sets in and worsens.  Only really feels it with weight bearing and feels it diffusely throughout the day.   He denies any injury but admits that he was doing more walking in last few days.  No hx of gout.  Denies any increased leg swelling and states he has had mild edema chronically.  They believe it is from Gleevec.  States I am \"98% in remission from CML\"   No hx of blood clots.  Never smoker.  No attempted interventions.     Review of Systems   Constitutional: Negative.    Respiratory: Negative.    Cardiovascular: Negative.    Musculoskeletal:        SEE HPI   Skin: Negative for rash.   Neurological: Negative for tingling and headaches.   Endo/Heme/Allergies: Does not bruise/bleed easily.   All other systems reviewed and are negative.       PMH:  has a past medical history of Murmur, heart; Heart burn; Hypertension; Diabetes (2005); Unspecified urinary incontinence; Heart valve disease; Indigestion; Hemorrhoid; Hyperlipidemia; Cancer (CMS-HCC); and Type II diabetes mellitus, well controlled (CMS-HCC) (1/18/2017).  MEDS:   Current outpatient prescriptions:   •  insulin glargine (LANTUS) 100 UNIT/ML Solution, Inject 20 Units as instructed every evening., Disp: 10 mL, Rfl: 0  •  ranitidine (ZANTAC) 150 MG Tab, TAKE ONE TABLET BY MOUTH TWICE DAILY, Disp: 180 Tab, Rfl: 3  •  furosemide (LASIX) 40 MG Tab, TAKE ONE TABLET BY MOUTH ONCE DAILY, Disp: 90 Tab, Rfl: 3  •  benazepril (LOTENSIN) 10 MG Tab, TAKE ONE TABLET BY MOUTH ONCE DAILY, Disp: 90 Tab, Rfl: 3  •  doxazosin (CARDURA) 2 MG Tab, TAKE TWO TABLETS BY MOUTH ONCE DAILY, Disp: 180 Tab, Rfl: 3  •  CRESTOR 5 MG Tab, TAKE ONE TABLET BY MOUTH IN THE EVENING, Disp: 90 Tab, Rfl: 3  •  KLOR-CON M20 20 MEQ Tab CR, " "TAKE ONE TABLET BY MOUTH TWICE DAILY, Disp: 180 Tab, Rfl: 3  •  imatinib (GLEEVEC) 100 MG TABS, Take 300 mg by mouth every day., Disp: , Rfl:   ALLERGIES:   Allergies   Allergen Reactions   • Nkda [No Known Drug Allergy]      SURGHX:   Past Surgical History   Procedure Laterality Date   • Brachy therapy  06   • Circumcision adult  8/17/2009     Performed by MIGUEL NEVILLE at SURGERY [facility] ORS   • Trans urethral resection prostate  8/17/2009     Performed by MIGUEL NEVILLE at SURGERY [facility] ORS   • Cystoscopy  1/25/2010     Performed by MIGUEL NEVILLE at SURGERY [facility] ORS   • Trans urethral resection prostate  1/25/2010     Performed by MIGUEL NEVILLE at SURGERY [facility] ORS   • Other abdominal surgery  2005     umbilical hernia repair   • Other  2007     vein stripping   • Cystoscopy  4/7/2011     Performed by MIGUEL NEVILLE at SURGERY [facility] ORS   • Sphincter prosthesis placement  4/7/2011     Performed by MIGUEL NEVILLE at SURGERY [facility] ORS   • Colon resection laparoscopic  8/30/2011     Performed by PRISCILLA ROGERS at SURGERY [facility] ORS   • Loi  1/30/2012     Performed by TREY TAPIA at ENDOSCOPY [facility] ORS   • Wide excision  8/12/2014     Performed by Veronica Ruth M.D. at SURGERY SAME DAY [facility]     SOCHX:  reports that he has never smoked. He has never used smokeless tobacco. He reports that he does not drink alcohol or use illicit drugs.  FH: Family history was reviewed, no pertinent findings to report     Objective:     /68 mmHg  Pulse 71  Temp(Src) 37.1 °C (98.8 °F)  Resp 16  Ht 1.854 m (6' 0.99\")  Wt 138.801 kg (306 lb)  BMI 40.38 kg/m2  SpO2 92%     Physical Exam   Constitutional: He is oriented to person, place, and time. He appears well-developed and well-nourished. No distress.   Neck: Normal range of motion. Neck supple.   Cardiovascular: Normal rate and regular rhythm.    Pulmonary/Chest: Effort normal and breath sounds normal. "   Musculoskeletal:        Legs:  Neurological: He is alert and oriented to person, place, and time.   Skin: Skin is warm and dry. No rash noted.   Psychiatric: He has a normal mood and affect. His behavior is normal.   Vitals reviewed.       RAD      FINDINGS:  No acute fracture identified.  Mild degenerative changes.  No definite knee joint effusion.         Impression        No acute fracture.         Reading Provider Reading Date     Zana Keyes M.D. Jul 26, 2017        Assessment/Plan:     1. Acute pain of left knee  DX-KNEE 3 VIEWS LEFT   2. Primary osteoarthritis of left knee         -RAD as above.  There is some mild arthritis.  Possible purely inflammatory process today, arthritis/overuse.  Worsened by weather in last few days.   -recommend ice, elevate, wrap and rest.   -discussed the risks/benefits of steroids, determined today that risks outweigh benefits at this time.   Declines anything else for pain.    -Nearly stage 4 renal disease, no NSAIDs.   -discussed doing doppler of leg, offered and recommended this.  Not classically presenting as DVT however there are some risk factors.  Patient understanding why I want to perform this test and risk in not doing so declines at this time in lieu of monitoring. He will follow up with Dr. Castano if needed      Supportive care, differential diagnoses, and indications for immediate follow-up discussed with patient.   Pathogenesis of diagnosis discussed including typical length and natural progression.   Instructed to return to clinic or nearest emergency department for any change in condition, further concerns, or worsening of symptoms.  Patient states understanding of the plan of care and discharge instructions.  Instructed to make an appointment, for follow up, with their primary care provider.      Chelsi Hernandez PA-C

## 2017-08-03 ENCOUNTER — TELEPHONE (OUTPATIENT)
Dept: MEDICAL GROUP | Facility: CLINIC | Age: 77
End: 2017-08-03

## 2017-08-03 DIAGNOSIS — S91.002D WOUND OF LEFT ANKLE, SUBSEQUENT ENCOUNTER: ICD-10-CM

## 2017-08-03 NOTE — TELEPHONE ENCOUNTER
Left detailed voice message for patient stating that the referral had been place and to call our office if he needs more information.

## 2017-08-03 NOTE — TELEPHONE ENCOUNTER
1. Caller Name: Sukumar Lin Page                                         Call Back Number: [phone] (home)         Patient approves a detailed voicemail message: yes    2. SPECIFIC Action To Be Taken: Referral to Wound Care center needed Urgently, per patient since last visit wound getting bigger.    3. Diagnosis/Clinical Reason for Request: open wound on left ankle    4. Specialty & Provider Name/Lab/Imaging Location: Wound Care center    5. Is appointment scheduled for requested order/referral: no    Patient informed they will receive a return phone call from the office ONLY if there are any questions before processing their request. Advised to call back if they haven't received a call from the referral department in 5 days.    Patient was informed how referrals work and if wound gets worst to go to UC/ER

## 2017-08-04 ENCOUNTER — HOSPITAL ENCOUNTER (OUTPATIENT)
Facility: MEDICAL CENTER | Age: 77
End: 2017-08-04
Attending: FAMILY MEDICINE
Payer: MEDICARE

## 2017-08-04 ENCOUNTER — NON-PROVIDER VISIT (OUTPATIENT)
Dept: WOUND CARE | Facility: MEDICAL CENTER | Age: 77
End: 2017-08-04
Attending: INTERNAL MEDICINE
Payer: MEDICARE

## 2017-08-04 DIAGNOSIS — S91.002D UNSPECIFIED OPEN WOUND, LEFT ANKLE, SUBSEQUENT ENCOUNTER: ICD-10-CM

## 2017-08-04 PROCEDURE — 97597 DBRDMT OPN WND 1ST 20 CM/<: CPT

## 2017-08-04 NOTE — CERTIFICATION
Advanced Wound Care  [facility]  [address]  [address]  [place] [address]  [phone] Fax: [phone]      Initial Evaluation  For Certification Period:  8/4/17 - 9/4/17      Referring Physician: Edgardo Castano DO  Primary Physician:        Consulting Physicians: Dr. Hernandez        Wound(s): L ankle  Start of Care: 8/4/17       Subjective:  I've had trouble of this leg since 1987.       HPI:  Pt reports he cut his leg badly, at work, in 1987, and he has had trouble with the area, off and on, ever since. Pt has CML, DM2, CKD, Hx prostate CA.               Pain: Pt says he has no pain, unless affected area is being manipulated, as with wound care. Periods of 6/10 during wound care.            Past Medical History:   Past Medical History   Diagnosis Date   • Murmur, heart    • Heart burn    • Hypertension    • Diabetes 2005     TYPE 2   • Unspecified urinary incontinence    • Heart valve disease    • Indigestion    • Hemorrhoid    • Hyperlipidemia    • Cancer (CMS-HCC)      Prostate; Chronic myleloid lukemia   • Type II diabetes mellitus, well controlled (CMS-HCC) 1/18/2017     Current Medications:   Current outpatient prescriptions:   •  insulin glargine (LANTUS) 100 UNIT/ML Solution, Inject 20 Units as instructed every evening., Disp: 10 mL, Rfl: 0  •  ranitidine (ZANTAC) 150 MG Tab, TAKE ONE TABLET BY MOUTH TWICE DAILY, Disp: 180 Tab, Rfl: 3  •  furosemide (LASIX) 40 MG Tab, TAKE ONE TABLET BY MOUTH ONCE DAILY, Disp: 90 Tab, Rfl: 3  •  benazepril (LOTENSIN) 10 MG Tab, TAKE ONE TABLET BY MOUTH ONCE DAILY, Disp: 90 Tab, Rfl: 3  •  doxazosin (CARDURA) 2 MG Tab, TAKE TWO TABLETS BY MOUTH ONCE DAILY, Disp: 180 Tab, Rfl: 3  •  CRESTOR 5 MG Tab, TAKE ONE TABLET BY MOUTH IN THE EVENING, Disp: 90 Tab, Rfl: 3  •  KLOR-CON M20 20 MEQ Tab CR, TAKE ONE TABLET BY MOUTH TWICE DAILY, Disp: 180 Tab, Rfl: 3  •  imatinib (GLEEVEC) 100 MG TABS, Take 300 mg by mouth every day., Disp: , Rfl:   Allergies:  Nkda  Past Surgical History:   Past Surgical History   Procedure Laterality Date   • Brachy therapy  06   • Circumcision adult  8/17/2009     Performed by MIGUEL NEVILLE at SURGERY [facility]S   • Trans urethral resection prostate  8/17/2009     Performed by MIGUEL NEVILLE at SURGERY [facility]   • Cystoscopy  1/25/2010     Performed by MIGUEL NEVILLE at SURGERY [facility]   • Trans urethral resection prostate  1/25/2010     Performed by MIGUEL NEVILLE at SURGERY [facility]   • Other abdominal surgery  2005     umbilical hernia repair   • Other  2007     vein stripping   • Cystoscopy  4/7/2011     Performed by MIGUEL NEVILLE at SURGERY [facility]   • Sphincter prosthesis placement  4/7/2011     Performed by MIGUEL NEVILLE at SURGERY [facility]   • Colon resection laparoscopic  8/30/2011     Performed by PRISCILLA ROGERS at SURGERY [facility]S   • Loi  1/30/2012     Performed by TREY TAPIA at [facility]   • Wide excision  8/12/2014     Performed by Veronica Ruth M.D. at SURGERY SAME DAY [facility]     Social History:   Social History     Social History   • Marital Status:      Spouse Name: N/A   • Number of Children: N/A   • Years of Education: N/A     Occupational History   • Not on file.     Social History Main Topics   • Smoking status: Never Smoker    • Smokeless tobacco: Never Used   • Alcohol Use: No   • Drug Use: No   • Sexual Activity:     Partners: Female     Other Topics Concern   • Not on file     Social History Narrative         Objective:      Tests and Measures: Brisk, triphasic DP and PT pulses  8/4/16 - Wound culture obtained  Pt reports blood sugars reun between 80 and 140.    Orthotic, protective, supportive devices:     Fall Risk Assessment (neyda all that apply with an X):             x 65 years or older               x Fall within the last 2 years, uses   Ambulatory devices   Loss of protective sensation in feet,    Use of  prostethic/orthotic, years               Presence of lower extremity/foot/toe amputation              Taking medication that increases risk (per facility policy)       Wound Characteristics                                                    Location: L ankle   Initial Evaluation  Date: 8/4/17   Tissue Type and %: Deep red skin with several open, weeping areas along superior edge.   Periwound: Satellite wounds circumferentially to about 3 cm out.   Drainage: Serous, moderate.   Exposed structures None   Wound Edges:   Indistinct.    Odor: none   S&S of Infection:   Erythema   Edema: 1+ to LE   Sensation: intact               Measurements: L ankle. Measured affected area. Initial Evaluation  Date: 8/4/17   Length (cm) 13   Width (cm) 10   Depth (cm) BELLA   Area (cm2) 130   Tract/undermine None        Procedures:     Debridement : CSWD with scalpel and forceps to remove 10cm2 dried crust overlying affected area.    Cleansed with:  NS                                                                        Periwound protected with: No sting skin prep   Primary dressing: Nystatin powder to main wound, crusted with No sting X2. Terbinafine cream to satellite lesions.   Secondary Dressing: Super foam dressing secured with hypafix tape   Other: Tubi F     Patient Education: Pt verbalized understanding to keep wound area clean and dry. Pt. Verbalized signs and symptoms of infection, including erythema, induration, increased pain, increased drainage and fever and to go to ER if any of these occur. Pt says he does not do anything special regarding nutrition related to his diabetes. Verbalized understanding he needs to eat balanced diet with adequate protein and vegetables for wound healing. Pt agreed with POC and was told to return 2X per week for wound care and told no show policy. He verbalized understanding. Gave him supplies for 2 dressing changes, in case there is too much drainage between appointments. He verbalized  understanding of procedure.    Professional Collaboration: Initial eval sent to Dr. Hernandez via Epic for signing.      Assessment:      Wound etiology: possible yeast infection    Wound Progress: Initial eval.     Rationale for Treatment: Nystatin powder and terbinafine cream to treat yeast infection. Super foam to absorb drainage.    Patient tolerance/compliance: Pt tolerated procedure well and expresses desire to care for wound as recommended.    Complicating factors: Diabetes, leukemia (and medication), chronic kidney disease, obesity    Need for ongoing Advanced Wound Care services: Needs advanced wound care for ongoing debridement, dressing selection, education.      Plan:      Treatment Plan and Recommendations:  Diagnosis/ICD9: S91.002D    Procedures/CPT: CSWD    Frequency: 2X/week      Treatment Goals: STG 2 Weeks  LTG 4 Weeks   Granulation Tissue: % %   Decrease Necrotic Tissue to: % %   Wound Phase:      Decrease Size by: 10% 20%   Periwound:      Decrease tracts/undermining by: % %   Decrease Pain:          At the time of each visit a thorough assessment of the patient is completed to assure the  appropriateness of our plan of care.  The dressings or modalities may need to be adapted   from the original plan to address any significant changes in the wound environment.          Clinician Signature:_______________________________Date__________________      Physician Signature:______________________________Date:__________________

## 2017-08-05 LAB
GRAM STN SPEC: NORMAL
SIGNIFICANT IND 70042: NORMAL
SITE SITE: NORMAL
SOURCE SOURCE: NORMAL

## 2017-08-07 ENCOUNTER — NON-PROVIDER VISIT (OUTPATIENT)
Dept: WOUND CARE | Facility: MEDICAL CENTER | Age: 77
End: 2017-08-07
Attending: INTERNAL MEDICINE
Payer: MEDICARE

## 2017-08-07 DIAGNOSIS — L08.9 WOUND INFECTION: ICD-10-CM

## 2017-08-07 DIAGNOSIS — T14.8XXA WOUND INFECTION: ICD-10-CM

## 2017-08-07 LAB
BACTERIA WND AEROBE CULT: ABNORMAL
GRAM STN SPEC: ABNORMAL
SIGNIFICANT IND 70042: ABNORMAL
SITE SITE: ABNORMAL
SOURCE SOURCE: ABNORMAL

## 2017-08-07 PROCEDURE — 97602 WOUND(S) CARE NON-SELECTIVE: CPT

## 2017-08-07 RX ORDER — AMOXICILLIN 500 MG/1
500 CAPSULE ORAL 2 TIMES DAILY
Qty: 14 CAP | Refills: 0 | Status: SHIPPED | OUTPATIENT
Start: 2017-08-07 | End: 2017-08-14

## 2017-08-07 NOTE — WOUND TEAM
Advanced Wound Care  [facility]  [address]  [address]  [place] [address]  [phone] Fax: [phone]      Initial Evaluation  For Certification Period:  8/4/17 - 9/4/17      Referring Physician: Edgardo Castano DO  Primary Physician:        Consulting Physicians: Dr. Hernandez        Wound(s): L ankle  Start of Care: 8/4/17       Subjective:  I've had trouble of this leg since 1987.       HPI:  Pt reports he cut his leg badly, at work, in 1987, and he has had trouble with the area, off and on, ever since. Pt has CML, DM2, CKD, Hx prostate CA.               Pain: Pt says he has no pain, unless affected area is being manipulated, as with wound care. Periods of 6/10 during wound care.            Past Medical History:   Past Medical History   Diagnosis Date   • Murmur, heart    • Heart burn    • Hypertension    • Diabetes 2005     TYPE 2   • Unspecified urinary incontinence    • Heart valve disease    • Indigestion    • Hemorrhoid    • Hyperlipidemia    • Cancer (CMS-HCC)      Prostate; Chronic myleloid lukemia   • Type II diabetes mellitus, well controlled (CMS-HCC) 1/18/2017     Current Medications:   Current outpatient prescriptions:   •  insulin glargine (LANTUS) 100 UNIT/ML Solution, Inject 20 Units as instructed every evening., Disp: 10 mL, Rfl: 0  •  ranitidine (ZANTAC) 150 MG Tab, TAKE ONE TABLET BY MOUTH TWICE DAILY, Disp: 180 Tab, Rfl: 3  •  furosemide (LASIX) 40 MG Tab, TAKE ONE TABLET BY MOUTH ONCE DAILY, Disp: 90 Tab, Rfl: 3  •  benazepril (LOTENSIN) 10 MG Tab, TAKE ONE TABLET BY MOUTH ONCE DAILY, Disp: 90 Tab, Rfl: 3  •  doxazosin (CARDURA) 2 MG Tab, TAKE TWO TABLETS BY MOUTH ONCE DAILY, Disp: 180 Tab, Rfl: 3  •  CRESTOR 5 MG Tab, TAKE ONE TABLET BY MOUTH IN THE EVENING, Disp: 90 Tab, Rfl: 3  •  KLOR-CON M20 20 MEQ Tab CR, TAKE ONE TABLET BY MOUTH TWICE DAILY, Disp: 180 Tab, Rfl: 3  •  imatinib (GLEEVEC) 100 MG TABS, Take 300 mg by mouth every day., Disp: , Rfl:   Allergies:  Nkda  Past Surgical History:   Past Surgical History   Procedure Laterality Date   • Brachy therapy  06   • Circumcision adult  8/17/2009     Performed by MIGUEL NEVILLE at SURGERY [facility]   • Trans urethral resection prostate  8/17/2009     Performed by MIGUEL NEVILLE at SURGERY [facility]   • Cystoscopy  1/25/2010     Performed by MIGUEL NEVILLE at SURGERY [facility]   • Trans urethral resection prostate  1/25/2010     Performed by MIGUEL NEVILLE at SURGERY [facility]   • Other abdominal surgery  2005     umbilical hernia repair   • Other  2007     vein stripping   • Cystoscopy  4/7/2011     Performed by MIGUEL NEVILLE at SURGERY [facility]   • Sphincter prosthesis placement  4/7/2011     Performed by MIGUEL NEVILLE at SURGERY [facility]   • Colon resection laparoscopic  8/30/2011     Performed by PRISCILLA ROGERS at SURGERY [facility]   • Loi  1/30/2012     Performed by TREY TAPIA at [facility]   • Wide excision  8/12/2014     Performed by Veronica Ruth M.D. at SURGERY SAME DAY [facility]     Social History:   Social History     Social History   • Marital Status:      Spouse Name: N/A   • Number of Children: N/A   • Years of Education: N/A     Occupational History   • Not on file.     Social History Main Topics   • Smoking status: Never Smoker    • Smokeless tobacco: Never Used   • Alcohol Use: No   • Drug Use: No   • Sexual Activity:     Partners: Female     Other Topics Concern   • Not on file     Social History Narrative         Objective:      Tests and Measures: Brisk, triphasic DP and PT pulses  8/4/16 - Wound culture obtained  Pt reports blood sugars reun between 80 and 140.    Orthotic, protective, supportive devices:     Fall Risk Assessment (neyda all that apply with an X):             x 65 years or older               x Fall within the last 2 years, uses   Ambulatory devices   Loss of protective sensation in feet,    Use of  prostethic/orthotic, years               Presence of lower extremity/foot/toe amputation              Taking medication that increases risk (per facility policy)       Wound Characteristics                                                    Location: L ankle   Initial Evaluation  Date: 8/4/17 8/7/17   Tissue Type and %: Deep red skin with several open, weeping areas along superior edge. 100 deep red   Periwound: Satellite wounds circumferentially to about 3 cm out. Dark red discoloration > 5 cm periwound, hemosiderin staining, varicosities    Drainage: Serous, moderate. Scant serous   Exposed structures None none   Wound Edges:   Indistinct.  irregular   Odor: none none   S&S of Infection:   Erythema + culture   Edema: 1+ to LE 1+ LE   Sensation: intact intact               Measurements: L ankle. Measured affected area. Initial Evaluation  Date: 8/4/17   Length (cm) 13   Width (cm) 10   Depth (cm) BELLA   Area (cm2) 130   Tract/undermine None        Procedures:     Debridement : non-selected with gauze   Cleansed with:  NS                                                                        Periwound protected with: No sting skin prep   Primary dressing: Nystatin powder +tac to periwound, acti 7 added due to + bacterial infection.   Secondary Dressing: Super foam dressing secured with hypafix tape   Other: Tubi F     Patient Education:   Professional Collaboration: 8/4/17 Initial eval sent to Dr. Hernandez via Epic for signing.      Assessment:      Wound etiology: possible yeast infection    Wound Progress: wound infected.     Rationale for Treatment: Nystatin powder and terbinafine cream to treat yeast infection. acticoat 7 for bacterial infection.  Super foam to absorb drainage.    Patient tolerance/compliance: Pt tolerated procedure well and expresses desire to care for wound as recommended.    Complicating factors: Diabetes, leukemia (and medication), chronic kidney disease, obesity    Need for ongoing Advanced  Wound Care services: Needs advanced wound care for ongoing debridement, dressing selection, education.      Plan:      Treatment Plan and Recommendations:  Diagnosis/ICD9: S91.002D    Procedures/CPT: CSWD    Frequency: 2X/week      Treatment Goals: STG 2 Weeks  LTG 4 Weeks   Granulation Tissue: % %   Decrease Necrotic Tissue to: % %   Wound Phase:      Decrease Size by: 10% 20%   Periwound:      Decrease tracts/undermining by: % %   Decrease Pain:          At the time of each visit a thorough assessment of the patient is completed to assure the  appropriateness of our plan of care.  The dressings or modalities may need to be adapted   from the original plan to address any significant changes in the wound environment.          Clinician Signature:_______________________________Date__________________      Physician Signature:______________________________Date:__________________

## 2017-08-07 NOTE — PROGRESS NOTES
Pt. with + wound culture.    Wound present on left leg.    Positive for Staphylococcus aureus, Heavy growth  -Enterococcus faecalis, Heavy growth    -Proteus mirabilis, Light growth   Chart reviewed.  Allergies noted.    Will treat with Amoxicillin 500mg po BID x 7 days.  Pharmacy: [facility] On license of [facility] [place] [place] - [address].    Jessica Khan, PT notified.

## 2017-08-10 ENCOUNTER — HOSPITAL ENCOUNTER (OUTPATIENT)
Dept: LAB | Facility: MEDICAL CENTER | Age: 77
End: 2017-08-10
Attending: UROLOGY
Payer: MEDICARE

## 2017-08-10 ENCOUNTER — NON-PROVIDER VISIT (OUTPATIENT)
Dept: WOUND CARE | Facility: MEDICAL CENTER | Age: 77
End: 2017-08-10
Attending: INTERNAL MEDICINE
Payer: MEDICARE

## 2017-08-10 LAB — PSA SERPL-MCNC: 0.91 NG/ML (ref 0–4)

## 2017-08-10 PROCEDURE — 84153 ASSAY OF PSA TOTAL: CPT

## 2017-08-10 PROCEDURE — 99213 OFFICE O/P EST LOW 20 MIN: CPT

## 2017-08-10 NOTE — WOUND TEAM
Advanced Wound Care  [facility]  [address]  [address]  [place] [address]  [phone] Fax: [phone]      Encounter Note  For Certification Period:  8/4/17 - 9/4/17      Referring Physician: Edgardo Castano DO  Primary Physician:        Consulting Physicians: Dr. Hernandez        Wound(s): L ankle  Start of Care: 8/4/17       Subjective:  I've had trouble of this leg since 1987.       HPI:  Pt reports he cut his leg badly, at work, in 1987, and he has had trouble with the area, off and on, ever since. Pt has CML, DM2, CKD, Hx prostate CA.               Pain: Pt denies pain           Current Medications:   Current outpatient prescriptions:   •  amoxicillin (AMOXIL) 500 MG Cap, Take 1 Cap by mouth 2 times a day for 7 days., Disp: 14 Cap, Rfl: 0  •  insulin glargine (LANTUS) 100 UNIT/ML Solution, Inject 20 Units as instructed every evening., Disp: 10 mL, Rfl: 0  •  ranitidine (ZANTAC) 150 MG Tab, TAKE ONE TABLET BY MOUTH TWICE DAILY, Disp: 180 Tab, Rfl: 3  •  furosemide (LASIX) 40 MG Tab, TAKE ONE TABLET BY MOUTH ONCE DAILY, Disp: 90 Tab, Rfl: 3  •  benazepril (LOTENSIN) 10 MG Tab, TAKE ONE TABLET BY MOUTH ONCE DAILY, Disp: 90 Tab, Rfl: 3  •  doxazosin (CARDURA) 2 MG Tab, TAKE TWO TABLETS BY MOUTH ONCE DAILY, Disp: 180 Tab, Rfl: 3  •  CRESTOR 5 MG Tab, TAKE ONE TABLET BY MOUTH IN THE EVENING, Disp: 90 Tab, Rfl: 3  •  KLOR-CON M20 20 MEQ Tab CR, TAKE ONE TABLET BY MOUTH TWICE DAILY, Disp: 180 Tab, Rfl: 3  •  imatinib (GLEEVEC) 100 MG TABS, Take 300 mg by mouth every day., Disp: , Rfl:   Allergies: Nkda      Objective:      Tests and Measures: Brisk, triphasic DP and PT pulses  8/4/16 - Wound culture obtained  Pt reports blood sugars run between 80 and 140.    Orthotic, protective, supportive devices:     Fall Risk Assessment (neyda all that apply with an X):             x 65 years or older               x Fall within the last 2 years, uses   Ambulatory devices   Loss of protective sensation in  "feet,    Use of prostethic/orthotic, years               Presence of lower extremity/foot/toe amputation              Taking medication that increases risk (per facility policy)       Wound Characteristics                                                    Location: L ankle   Initial Evaluation  Date: 8/4/17 8/10/17   Tissue Type and %: Deep red skin with several open, weeping areas along superior edge. 100 dry crusty scales - pt refuses debridement, says he wants it \"to stay dried up\"   Periwound: Satellite wounds circumferentially to about 3 cm out. Dark red discoloration > 5 cm periwound, hemosiderin staining, varicosities    Drainage: Serous, moderate. none   Exposed structures None none   Wound Edges:   Indistinct.  No wound   Odor: none none   S&S of Infection:   Erythema none   Edema: 1+ to LE 1+ LE   Sensation: intact intact               Measurements: L ankle. Measured affected area. 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 He says this wound has re-occurred for 10 years. I recommended he be seen by vascular, and get this assessed with USS. He may be a candidate for sclerotherapy. He is agreeable, so I asked PAR to schedule him on the next availabe Friday appt with Dr. Martinez    Professional Collaboration:none    Assessment:      Wound etiology: CVI    Wound Progress: resolved/dried with crusty, scales. Pt refuses debridement "     Rationale for Treatment:  acticoat 7 at pt's request/insistence    Patient tolerance/compliance: Pt tolerated care well and expresses desire to care for wound as recommended.    Complicating factors: Diabetes, leukemia (and medication), chronic kidney disease, obesity    Need for ongoing Advanced Wound Care services: Needs advanced wound care for ongoing debridement, dressing selection, education.      Plan:      Treatment Plan and Recommendations:  Diagnosis/ICD9: S91.002D    Procedures/CPT: CSWD    Frequency: 2X/week      Treatment Goals: STG 2 Weeks  LTG 4 Weeks   Granulation Tissue: % %   Decrease Necrotic Tissue to: % %   Wound Phase:      Decrease Size by: 10% 20%   Periwound:      Decrease tracts/undermining by: % %   Decrease Pain:          At the time of each visit a thorough assessment of the patient is completed to assure the  appropriateness of our plan of care.  The dressings or modalities may need to be adapted   from the original plan to address any significant changes in the wound environment.          Clinician Signature:_______________________________Date__________________      Physician Signature:______________________________Date:__________________

## 2017-08-10 NOTE — WOUND TEAM
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 He says this wound has re-occurred for 10 years. I recommended he be seen by vascular, and get this assessed with USS. He may be a candidate for sclerotherapy. He is agreeable, so I asked PAR to schedule him on the next availabe Friday appt with Dr. Martinez

## 2017-08-14 RX ORDER — ROSUVASTATIN CALCIUM 5 MG/1
TABLET, COATED ORAL
Qty: 90 TAB | Refills: 3 | Status: SHIPPED | OUTPATIENT
Start: 2017-08-14 | End: 2018-05-22

## 2017-08-15 ENCOUNTER — OFFICE VISIT (OUTPATIENT)
Dept: WOUND CARE | Facility: MEDICAL CENTER | Age: 77
End: 2017-08-15
Attending: INTERNAL MEDICINE
Payer: MEDICARE

## 2017-08-15 DIAGNOSIS — E11.9 TYPE II DIABETES MELLITUS, WELL CONTROLLED (HCC): ICD-10-CM

## 2017-08-15 DIAGNOSIS — S81.802S WOUND OF LEFT LOWER EXTREMITY, SEQUELA: ICD-10-CM

## 2017-08-15 PROBLEM — S81.802A WOUND OF LEFT LOWER EXTREMITY: Status: ACTIVE | Noted: 2017-08-15

## 2017-08-15 PROCEDURE — 97602 WOUND(S) CARE NON-SELECTIVE: CPT | Performed by: FAMILY MEDICINE

## 2017-08-15 PROCEDURE — 99213 OFFICE O/P EST LOW 20 MIN: CPT | Performed by: FAMILY MEDICINE

## 2017-08-15 PROCEDURE — 97597 DBRDMT OPN WND 1ST 20 CM/<: CPT

## 2017-08-15 NOTE — WOUND TEAM
Advanced Wound Care  [facility]  [address]  [address]  [place] [address]  [phone] Fax: [phone]    Encounter Note  For Certification Period: 8/4/17 - 9/4/17      Referring Physician: Edgardo Castano DO  Primary Physician:        Consulting Physicians: Dr. Hernandez        Wound(s): L ankle  Start of Care: 8/4/17       Subjective:  I've had trouble of this leg since 1987.       HPI:  Pt reports he cut his leg badly, at work, in 1987, and he has had trouble with the area, off and on, ever since. Pt has CML, DM2, CKD, Hx prostate CA.               Pain: Pt denies pain           Current Medications:   Current outpatient prescriptions:   •  rosuvastatin (CRESTOR) 5 MG Tab, TAKE ONE TABLET BY MOUTH IN THE EVENING, Disp: 90 Tab, Rfl: 3  •  insulin glargine (LANTUS) 100 UNIT/ML Solution, Inject 20 Units as instructed every evening., Disp: 10 mL, Rfl: 0  •  ranitidine (ZANTAC) 150 MG Tab, TAKE ONE TABLET BY MOUTH TWICE DAILY, Disp: 180 Tab, Rfl: 3  •  furosemide (LASIX) 40 MG Tab, TAKE ONE TABLET BY MOUTH ONCE DAILY, Disp: 90 Tab, Rfl: 3  •  benazepril (LOTENSIN) 10 MG Tab, TAKE ONE TABLET BY MOUTH ONCE DAILY, Disp: 90 Tab, Rfl: 3  •  doxazosin (CARDURA) 2 MG Tab, TAKE TWO TABLETS BY MOUTH ONCE DAILY, Disp: 180 Tab, Rfl: 3  •  KLOR-CON M20 20 MEQ Tab CR, TAKE ONE TABLET BY MOUTH TWICE DAILY, Disp: 180 Tab, Rfl: 3  •  imatinib (GLEEVEC) 100 MG TABS, Take 300 mg by mouth every day., Disp: , Rfl:   Allergies: Nkda      Objective:      Tests and Measures: Brisk, triphasic DP and PT pulses  8/4/17 - Wound culture obtained  Pt reports blood sugars run between 80 and 140.    Orthotic, protective, supportive devices:     Fall Risk Assessment (neyda all that apply with an X):             x 65 years or older               x Fall within the last 2 years, uses   Ambulatory devices   Loss of protective sensation in feet,    Use of prostethic/orthotic, years               Presence of lower extremity/foot/toe  amputation              Taking medication that increases risk (per facility policy)       Wound Characteristics                                                    Location: L ankle   Initial Evaluation  Date: 8/4/17 Encounter Date:  8/15/17   Tissue Type and %: Deep red skin with several open, weeping areas along superior edge. 100% dry, crusty skin   Periwound: Satellite wounds circumferentially to about 3 cm out. Dark red discoloration > 2 cm periwound, hemosiderin staining, varicosities    Drainage: Serous, moderate. None   Exposed structures None None   Wound Edges:   Indistinct.  No wound   Odor: None None   S&S of Infection:   Erythema None   Edema: 1+ to LE 1+ LE   Sensation: intact Intact               Measurements: L ankle. Measured affected area. Initial Evaluation  Date: 8/4/17 Encounter Date: 8/15/2017   Length (cm) 13 resolved   Width (cm) 10    Depth (cm) BELLA    Area (cm2) 130    Tract/undermine None         Procedures:     Debridement: CSWD with scalpel to scrape some of the scaly dry skin off by Dr. Hernandez. 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 He says this wound has re-occurred for 10 years. I recommended he be seen by vascular, and get this assessed with USS. He may be a candidate for sclerotherapy. He is agreeable, so I asked PAR to schedule him on the next availabe Friday appt with Dr. Martinez    Professional Collaboration: Dr. Hernandez for assessment of wound and light debridement. Message sent to Veronica ANTHONY to ask that patient be added to Dr. Martinez's schedule on 8/25/17 to discuss  patient's vascular status.     Assessment:      Wound etiology: CVI    Wound Progress: resolved/dried with crusty, scales.     Rationale for Treatment:  Nystatin powder as this worked well recently for patient.     Patient tolerance/compliance: Pt tolerated care well and expresses desire to care for wound as recommended.    Complicating factors: Diabetes, leukemia (and medication), chronic kidney disease, obesity    Need for ongoing Advanced Wound Care services: Needs advanced wound care for ongoing debridement, dressing selection, education.      Plan:      Treatment Plan and Recommendations:  Diagnosis/ICD9: S91.002D    Procedures/CPT: CSWD    Frequency: 2X/week      Treatment Goals: STG 2 Weeks  LTG 4 Weeks   Granulation Tissue: % %   Decrease Necrotic Tissue to: % %   Wound Phase:      Decrease Size by: 10% 20%   Periwound:      Decrease tracts/undermining by: % %   Decrease Pain:          At the time of each visit a thorough assessment of the patient is completed to assure the  appropriateness of our plan of care.  The dressings or modalities may need to be adapted   from the original plan to address any significant changes in the wound environment.          Clinician Signature:_______________________________Date__________________      Physician Signature:______________________________Date:__________________

## 2017-08-15 NOTE — PROGRESS NOTES
Pt is a 76 year old male who presents with a chronic wound.    He states that in 1987 he cut his leg badly at work.      Since then he has had trouble with the wound healing and re-opening.    He had a procedure on his veins several years back with Dr. Martinez,   which improved the wound, however it continues to be an issue.    Today the wound is essentially healed but the skin is very scaly and dry.  A small amount of scaly skin was rubbed off the wound with dry guaze.      He recently responded very well to treatment with Nystatin powder to  main wound, crusted with No sting X2.    He will be scheduled with Dr Martinez to discuss his vascular status.

## 2017-08-16 ENCOUNTER — APPOINTMENT (OUTPATIENT)
Dept: WOUND CARE | Facility: MEDICAL CENTER | Age: 77
End: 2017-08-16
Attending: INTERNAL MEDICINE
Payer: MEDICARE

## 2017-08-18 ENCOUNTER — NON-PROVIDER VISIT (OUTPATIENT)
Dept: WOUND CARE | Facility: MEDICAL CENTER | Age: 77
End: 2017-08-18
Attending: INTERNAL MEDICINE
Payer: MEDICARE

## 2017-08-18 PROCEDURE — 97602 WOUND(S) CARE NON-SELECTIVE: CPT

## 2017-08-21 ENCOUNTER — APPOINTMENT (OUTPATIENT)
Dept: WOUND CARE | Facility: MEDICAL CENTER | Age: 77
End: 2017-08-21
Attending: INTERNAL MEDICINE
Payer: MEDICARE

## 2017-08-22 ENCOUNTER — OFFICE VISIT (OUTPATIENT)
Dept: MEDICAL GROUP | Facility: CLINIC | Age: 77
End: 2017-08-22
Payer: MEDICARE

## 2017-08-22 ENCOUNTER — NON-PROVIDER VISIT (OUTPATIENT)
Dept: WOUND CARE | Facility: MEDICAL CENTER | Age: 77
End: 2017-08-22
Attending: INTERNAL MEDICINE
Payer: MEDICARE

## 2017-08-22 VITALS
HEART RATE: 84 BPM | OXYGEN SATURATION: 97 % | WEIGHT: 303 LBS | SYSTOLIC BLOOD PRESSURE: 120 MMHG | TEMPERATURE: 98.8 F | HEIGHT: 73 IN | BODY MASS INDEX: 40.16 KG/M2 | DIASTOLIC BLOOD PRESSURE: 80 MMHG

## 2017-08-22 DIAGNOSIS — I83.009 STASIS ULCER, UNSPECIFIED LATERALITY (HCC): ICD-10-CM

## 2017-08-22 DIAGNOSIS — E11.22 TYPE 2 DIABETES MELLITUS WITH DIABETIC CHRONIC KIDNEY DISEASE, UNSPECIFIED CKD STAGE, UNSPECIFIED LONG TERM INSULIN USE STATUS: Chronic | ICD-10-CM

## 2017-08-22 DIAGNOSIS — L97.909 STASIS ULCER, UNSPECIFIED LATERALITY (HCC): ICD-10-CM

## 2017-08-22 DIAGNOSIS — C92.10 CML (CHRONIC MYELOCYTIC LEUKEMIA) (HCC): Chronic | ICD-10-CM

## 2017-08-22 PROCEDURE — 97597 DBRDMT OPN WND 1ST 20 CM/<: CPT

## 2017-08-22 PROCEDURE — 97602 WOUND(S) CARE NON-SELECTIVE: CPT

## 2017-08-22 PROCEDURE — 99214 OFFICE O/P EST MOD 30 MIN: CPT | Performed by: INTERNAL MEDICINE

## 2017-08-22 RX ORDER — INSULIN GLARGINE 100 [IU]/ML
12-17 INJECTION, SOLUTION SUBCUTANEOUS 2 TIMES DAILY
Qty: 1 VIAL | Refills: 0
Start: 2017-08-22 | End: 2017-10-23

## 2017-08-22 ASSESSMENT — PAIN SCALES - GENERAL: PAINLEVEL: 8=MODERATE-SEVERE PAIN

## 2017-08-22 NOTE — MR AVS SNAPSHOT
"        Sukumar Mode Page   2017 4:20 PM   Office Visit   MRN: [id number]    Department:  [facility]   Dept Phone:  [phone]    Description:  Male : 1940   Provider:  Edgardo Castano D.O.           Reason for Visit     Knee Pain L knee pain x 3 weeks, requesting referral to Dr. Avalos      Allergies as of 2017     Allergen Noted Reactions    Nkda [No Known Drug Allergy] 2010         You were diagnosed with     Stasis ulcer, unspecified laterality (CMS-HCC)   [886447]         Vital Signs     Blood Pressure Pulse Temperature Height Weight Body Mass Index    120/80 mmHg 84 37.1 °C (98.8 °F) 1.854 m (6' 0.99\") 137.44 kg (303 lb) 39.98 kg/m2    Oxygen Saturation Smoking Status                97% Never Smoker           Basic Information     Date Of Birth Sex Race Ethnicity Preferred Language    1940 Male White Non- English      Your appointments     Aug 25, 2017 10:30 AM   DEBRIDEMENT/TREATMENT with Valentina Martinez M.D., WOUND CARE PER [facility]   [facility] ([address] [place] [address])    [address]  [place] [address]   [phone]            Aug 28, 2017 10:30 AM   Re-Certification 15 with Delphine Hernandez M.D., Lena Leon R.N.   [facility] ([address] [place] [address])    [address]  [place] [address]   [phone]            Sep 01, 2017 10:30 AM   DEBRIDEMENT/TREATMENT with Alyce Telles R.N.   [facility] ([address] [place] [address])    [address]  [place] [address]   [phone]            Sep 04, 2017  4:30 PM   DEBRIDEMENT/TREATMENT with Paradise Butler R.N.   [facility] ([address] [place] [address])    [address]  [place] [address]   [phone]            Sep 05, 2017  9:20 AM   Established Patient with Edgardo Castano D.O.   [facility] ([address]  [place] [id number] [phone]           You will be receiving a confirmation call a few days before your appointment from our automated call " confirmation system.            Sep 06, 2017 10:20 AM   Established Patient with Edgardo Castano D.O.   [facility] ([facility] [place]    [address]  [place] [address]   [phone]           You will be receiving a confirmation call a few days before your appointment from our automated call confirmation system.            Sep 07, 2017  9:30 AM   DEBRIDEMENT/TREATMENT with Alyce Telles R.N.   [facility] ([address] [place] [address])    [address]  [place] [address]   [phone]            Sep 11, 2017  9:00 AM   DEBRIDEMENT/TREATMENT with Shaista Parker R.N.   [facility] ([address] [place] [address])    [address]  [place] [address]   [phone]            Sep 14, 2017  9:30 AM   DEBRIDEMENT/TREATMENT with Wilfredo Mcdaniel R.N.   [facility] ([address] [place] [address])    [address]  [place] [address]   [phone]            Sep 18, 2017  9:30 AM   DEBRIDEMENT/TREATMENT with Wilfredo Mcdaniel R.N.   [facility] ([address] [place] [address])    [address]  [place] [address]   [phone]            Sep 21, 2017  9:30 AM   DEBRIDEMENT/TREATMENT with Jessica Khan P.T.   [facility] ([address] [place] [address])    [address]  [place] [address]   [phone]              Problem List              ICD-10-CM Priority Class Noted - Resolved    Aortic valve insufficiency I35.1   8/25/2011 - Present    CML (chronic myelocytic leukemia) (CMS-HCC) (Chronic) C92.10   8/25/2011 - Present    HTN (hypertension) (Chronic) I10   1/27/2012 - Present    Dyslipidemia (Chronic) E78.5   1/27/2012 - Present    Type II diabetes mellitus, well controlled (CMS-HCC) E11.9   1/18/2017 - Present    Morbid obesity with BMI of 40.0-44.9, adult ([facility]) E66.01, Z68.41   5/8/2017 - Present    Hypoglycemia E16.2   5/26/2017 - Present    Elevated TSH R94.6   5/26/2017 - Present    Type 2 diabetes mellitus (CMS-HCC) (Chronic) E11.9   5/26/2017 - Present    Wound infection T14.8, L08.9   8/7/2017 -  Present    Wound of left lower extremity S81.802A   8/15/2017 - Present      Health Maintenance        Date Due Completion Dates    IMM ZOSTER VACCINE 10/30/1999 ---    IMM INFLUENZA (1) 9/1/2017 10/11/2016, 10/7/2015, 9/27/2014    DIABETES MONOFILAMENT / LE EXAM 10/11/2017 10/11/2016    A1C SCREENING 11/9/2017 5/9/2017, 12/19/2016, 8/15/2016, 6/27/2016, 4/1/2016, 3/2/2015, 12/3/2014, 6/25/2014, 12/2/2013, 8/6/2013, 5/6/2013, 12/11/2012, 9/20/2012, 3/26/2012, 9/19/2011    RETINAL SCREENING 3/7/2018 3/7/2017, 10/1/2015, 1/24/2014    FASTING LIPID PROFILE 5/9/2018 5/9/2017, 12/19/2016, 2/4/2014, 6/10/2013, 5/6/2013, 12/11/2012, 9/20/2012    URINE ACR / MICROALBUMIN 5/9/2018 5/9/2017, 12/19/2016, 12/19/2016, 8/15/2016, 8/15/2016, 2/2/2016, 10/1/2015, 6/16/2015, 3/2/2015, 9/2/2014, 6/25/2014, 5/15/2014, 2/4/2014, 12/2/2013, 11/4/2013, 6/10/2013, 2/15/2013, 12/11/2012, 9/20/2012, 9/20/2012, 3/26/2012, 9/19/2011    SERUM CREATININE 5/26/2018 5/26/2017, 5/25/2017, 5/9/2017, 5/9/2017, 12/19/2016, 12/19/2016, 9/6/2016, 8/15/2016, 8/15/2016, 7/14/2016, 6/27/2016, 6/27/2016, 4/1/2016, 2/2/2016, 10/1/2015, 6/16/2015, 6/16/2015, 3/2/2015, 3/2/2015, 12/3/2014, 9/2/2014, 8/4/2014, 5/15/2014, 2/4/2014, 1/17/2014, 11/4/2013, 6/10/2013, 6/10/2013, 5/6/2013, 2/15/2013, 12/26/2012, 11/9/2012, 7/5/2012, 4/26/2012, 3/6/2012, 2/28/2012, 2/21/2012, 2/10/2012, 2/3/2012, 2/1/2012, 1/31/2012, 1/29/2012, 1/28/2012, 1/27/2012, 1/26/2012, 1/6/2012, 9/1/2011, 8/31/2011, 8/30/2011, 8/19/2011, 7/5/2011, 4/8/2011, 11/4/2010, 1/25/2010, 8/18/2009, 8/17/2009, 8/11/2009    COLONOSCOPY 12/9/2018 12/9/2015, 12/7/2012    IMM DTaP/Tdap/Td Vaccine (2 - Td) 7/2/2026 7/2/2016            Current Immunizations     13-VALENT PCV PREVNAR 1/12/2016    Influenza Vaccine 10/1/2010    Influenza Vaccine Adult HD 10/11/2016  4:00 PM, 9/27/2014    Influenza Vaccine Quad Inj (Preserved) 10/7/2015    Pneumococcal Vaccine (UF)Historical Data 10/1/2006    Pneumococcal  polysaccharide vaccine (PPSV-23) 1/18/2017    Tdap Vaccine 7/2/2016 11:34 AM      Below and/or attached are the medications your provider expects you to take. Review all of your home medications and newly ordered medications with your provider and/or pharmacist. Follow medication instructions as directed by your provider and/or pharmacist. Please keep your medication list with you and share with your provider. Update the information when medications are discontinued, doses are changed, or new medications (including over-the-counter products) are added; and carry medication information at all times in the event of emergency situations     Allergies:  NKDA - (reactions not documented)               Medications  Valid as of: August 22, 2017 -  4:24 PM    Generic Name Brand Name Tablet Size Instructions for use    Benazepril HCl (Tab) LOTENSIN 10 MG TAKE ONE TABLET BY MOUTH ONCE DAILY        Doxazosin Mesylate (Tab) CARDURA 2 MG TAKE TWO TABLETS BY MOUTH ONCE DAILY        Furosemide (Tab) LASIX 40 MG TAKE ONE TABLET BY MOUTH ONCE DAILY        Imatinib Mesylate (Tab) GLEEVEC 100 MG Take 300 mg by mouth every day.        Insulin Glargine (Solution) LANTUS 100 UNIT/ML Inject 12-17 Units as instructed 2 times a day. 12 units at night 17 units in the morning.        Potassium Chloride Anabelle CR (Tab CR) KLOR-CON M20 20 MEQ TAKE ONE TABLET BY MOUTH TWICE DAILY        RaNITidine HCl (Tab) ZANTAC 150 MG TAKE ONE TABLET BY MOUTH TWICE DAILY        Rosuvastatin Calcium (Tab) CRESTOR 5 MG TAKE ONE TABLET BY MOUTH IN THE EVENING        .                 Medicines prescribed today were sent to:     [facility] [address] [place] [address] - [address] [place] [address]    Phone: [phone] Fax: [phone]    Open 24 Hours?: No      Medication refill instructions:       If your prescription bottle indicates you have medication refills left, it is not necessary to call your provider’s office. Please  contact your pharmacy and they will refill your medication.    If your prescription bottle indicates you do not have any refills left, you may request refills at any time through one of the following ways: The online avolution system (except Urgent Care), by calling your provider’s office, or by asking your pharmacy to contact your provider’s office with a refill request. Medication refills are processed only during regular business hours and may not be available until the next business day. Your provider may request additional information or to have a follow-up visit with you prior to refilling your medication.   *Please Note: Medication refills are assigned a new Rx number when refilled electronically. Your pharmacy may indicate that no refills were authorized even though a new prescription for the same medication is available at the pharmacy. Please request the medicine by name with the pharmacy before contacting your provider for a refill.        Other Notes About Your Plan     per Nephrology visit of 6/29/15-patient has proteinuria secondary to diabetic nephropathy. Code 250.40           avolution Status: Patient Declined

## 2017-08-22 NOTE — Clinical Note
August 22, 2017        Sukumar Mode Page  [id number] [place] [address]        Dear Sukumar:    Please increase your insulin glargine (Lantus) insulin to 17 units in the morning and 12 units at night.     If you have any questions or concerns, please don't hesitate to call.        Sincerely,        Edgardo Castano D.O.

## 2017-08-22 NOTE — WOUND TEAM
Advanced Wound Care  [facility]  [address]  [address]  [place] [address]  [phone] Fax: [phone]    Encounter Note  For Certification Period: 8/4/17 - 9/4/17      Referring Physician: Edgardo Castano DO  Primary Physician:        Consulting Physicians: Dr. Hernandez        Wound(s): L ankle  Start of Care: 8/4/17       Subjective:  I've had trouble of this leg since 1987.       HPI:  Pt reports he cut his leg badly, at work, in 1987, and he has had trouble with the area, off and on, ever since. Pt has CML, DM2, CKD, Hx prostate CA.               Pain: Pt denies pain           Allergies: Nkda      Objective:      Tests and Measures: 8/22/17 JOSE RAUL performed by Fatimah Green, with my supervision. LT ,  non compressible, ,  160/130 = 1.23  8/4/17 - Wound culture obtained  Pt reports blood sugars run between 80 and 140.    Orthotic, protective, supportive devices:     Fall Risk Assessment (neyda all that apply with an X): Completed at initial eval 08/04/2017              Wound Characteristics                                                    Location: L ankle   Initial Evaluation  Date: 8/4/17 Encounter Date:  8/17/17 Encounter Date:  8/22/17   Tissue Type and %: Deep red skin with several open, weeping areas along superior edge. 95% dry, crusty skin, 5% scattered open areas with moist red tissue 100% red viable open moist area   Periwound: Satellite wounds circumferentially to about 3 cm out. hemosiderin staining Erythema, hemosiderin staining   Drainage: Serous, moderate. None Scant ss   Exposed structures None None none   Wound Edges:   Indistinct.  Irregular irregular   Odor: None None none   S&S of Infection:   Erythema None erythema   Edema: 1+ to LE 1+ LE 1+ LE   Sensation: intact Intact intact               Measurements: L ankle. Measured affected area. Initial Evaluation  Date: 8/4/17 Encounter Date: 8/17/2017 Encounter Date: 8/22/2017  Entire area with  "scattered openings   Length (cm) 13 Few scattered open areas 13   Width (cm) 10  8   Depth (cm) BELLA  0.1   Area (cm2) 130  104   Tract/undermine None  none        Procedures:     Debridement:CSWD with scissors and forceps to remove approx 3 cm2 of loose dried yellow crusty tissue.  Non selective with NS gauze to area   Cleansed with:  No rinse foam and washcloth to LLE                                                                  Periwound protected with: crusted with Nystatin powder and No sting skin prep   Primary dressing:NS moistened Aquacel AG to scattered open areas   Secondary Dressing: Super absorb to cover area secured with hypafix   Other: Tubi F     Patient Education: Instructed pt on rationale for JOSE RAUL, that he has appt with Dr. Martinez on Friday at 10:30 (pt stated he had a procedure with her \"about 9 years ago\"), rationale for silver to open areas and for crusting with Nystatin (pt requested Nystatin powder), to return 2x/week for appointments. He verbalized understanding.       Professional Collaboration: KELSI Carpenter, to schedule recert with Dr. Martinez on Friday, rather than have Dr. Hernandez also see pt Monday for recert.    Assessment:      Wound etiology: CVI    Wound Progress: Few small open areas.Dry flaky skin.     Rationale for Treatment:  Nystatin powder as this worked well recently for patient crusting, Silver for antimicrobial and absorption, tubi for compression.     Patient tolerance/compliance: Pt tolerated care well and expresses desire to care for wound as recommended.    Complicating factors: Diabetes, leukemia (and medication), chronic kidney disease, obesity    Need for ongoing Advanced Wound Care services: Needs advanced wound care for ongoing debridement, dressing selection, education.      Plan:      Treatment Plan and Recommendations:  Diagnosis/ICD9: S91.002D    Procedures/CPT: CSWD    Frequency: 2X/week      Treatment Goals: STG 2 Weeks  LTG 4 Weeks   Granulation Tissue: % % "   Decrease Necrotic Tissue to: % %   Wound Phase:      Decrease Size by: 10% 20%   Periwound:      Decrease tracts/undermining by: % %   Decrease Pain:          At the time of each visit a thorough assessment of the patient is completed to assure the  appropriateness of our plan of care.  The dressings or modalities may need to be adapted   from the original plan to address any significant changes in the wound environment.          Clinician Signature:_______________________________Date__________________      Physician Signature:______________________________Date:__________________

## 2017-08-23 NOTE — PROGRESS NOTES
CC: Sukumar Quiñonez is a 76 y.o. male is suffering from   Chief Complaint   Patient presents with   • Knee Pain     L knee pain x 3 weeks, requesting referral to Dr. Avalos         SUBJECTIVE:  1. Stasis ulcer, unspecified laterality ([facility] [place])  Lata is here for follow-up, continues to have problems with a nonhealing left leg wound ulcer. We have also discussed these having left knee pain by fields lesser of an issue x-rays were previously reviewed. Patient is to see Dr. Martinez regarding this wound.     2. CML (chronic myelocytic leukemia) (CMS-HCC)  Patient has a history of CML, is on Gleevec, query whether or not this medication may be causing problems with his ability to heal.     3. Type 2 diabetes mellitus with diabetic chronic kidney disease, unspecified CKD stage, unspecified long term insulin use status (CMS-HCC)  Patient with a history of type 2 diabetes, diabetic control is not optimal. He's had problems with hypoglycemia, recommend that we split his Lantus insulin taking 17 units in the morning, 12 units at night to see if this improves his glycemic control for better wound healing.         Past social, family, history:   Social History   Substance Use Topics   • Smoking status: Never Smoker    • Smokeless tobacco: Never Used   • Alcohol Use: No         MEDICATIONS:    Current outpatient prescriptions:   •  insulin glargine (LANTUS) 100 UNIT/ML Solution, Inject 12-17 Units as instructed 2 times a day. 12 units at night 17 units in the morning., Disp: 1 Vial, Rfl: 0  •  rosuvastatin (CRESTOR) 5 MG Tab, TAKE ONE TABLET BY MOUTH IN THE EVENING, Disp: 90 Tab, Rfl: 3  •  ranitidine (ZANTAC) 150 MG Tab, TAKE ONE TABLET BY MOUTH TWICE DAILY, Disp: 180 Tab, Rfl: 3  •  furosemide (LASIX) 40 MG Tab, TAKE ONE TABLET BY MOUTH ONCE DAILY, Disp: 90 Tab, Rfl: 3  •  benazepril (LOTENSIN) 10 MG Tab, TAKE ONE TABLET BY MOUTH ONCE DAILY, Disp: 90 Tab, Rfl: 3  •  doxazosin (CARDURA) 2 MG Tab, TAKE TWO TABLETS BY  "MOUTH ONCE DAILY, Disp: 180 Tab, Rfl: 3  •  KLOR-CON M20 20 MEQ Tab CR, TAKE ONE TABLET BY MOUTH TWICE DAILY, Disp: 180 Tab, Rfl: 3  •  imatinib (GLEEVEC) 100 MG TABS, Take 300 mg by mouth every day., Disp: , Rfl:     PROBLEMS:  Patient Active Problem List    Diagnosis Date Noted   • Wound of left lower extremity 08/15/2017   • Wound infection 08/07/2017   • Hypoglycemia 05/26/2017   • Elevated TSH 05/26/2017   • Type 2 diabetes mellitus (CMS-HCC) 05/26/2017   • Morbid obesity with BMI of 40.0-44.9, adult ([facility]) 05/08/2017   • Type II diabetes mellitus, well controlled (CMS-HCC) 01/18/2017   • HTN (hypertension) 01/27/2012   • Dyslipidemia 01/27/2012   • Aortic valve insufficiency 08/25/2011   • CML (chronic myelocytic leukemia) (CMS-HCC) 08/25/2011       REVIEW OF SYSTEMS:  Gen.:  No Nausea, Vomiting, fever, Chills.  Heart: No chest pain.  Lungs:  No shortness of Breath.  Psychological: Carlos unusual Anxiety depression     PHYSICAL EXAM   Constitutional: Alert, cooperative, not in acute distress.  Cardiovascular:  Rate Rhythm is regular without murmurs rubs clicks.     Thorax & Lungs: Clear to auscultation, no wheezing, rhonchi, or rales  HENT: Normocephalic, Atraumatic.  Eyes: PERRLA, EOMI, Conjunctiva normal.   Neck: Trachia is midline no swelling of the thyroid.   Musculoskeletal: Wound was inspected the left medial leg, silver strips were not removed  Neurologic: Alert & oriented x 3, cranial nerves II through XII are intact, Normal motor function, Normal sensory function, No focal deficits noted.   Psychiatric: Affect normal, Judgment normal, Mood normal.     VITAL SIGNS:/80 mmHg  Pulse 84  Temp(Src) 37.1 °C (98.8 °F)  Ht 1.854 m (6' 0.99\")  Wt 137.44 kg (303 lb)  BMI 39.98 kg/m2  SpO2 97%    Labs: Reviewed    Assessment:                                                     Plan:    1. Stasis ulcer, unspecified laterality (CMS-HCC)  Stasis ulcer left lower extremity medially wound inspected, " follow-up with Dr. Martinez    2. CML (chronic myelocytic leukemia) (CMS-HCC)  CML with good control on Gleevec, we will call Dr. Oates's office to assess whether this medication can be stopped temporarily    3. Type 2 diabetes mellitus with diabetic chronic kidney disease, unspecified CKD stage, unspecified long term insulin use status (CMS-HCC)  History of hypoglycemia have recommended the patient split his insulin using 17 units in the morning 12 units at night.  - insulin glargine (LANTUS) 100 UNIT/ML Solution; Inject 12-17 Units as instructed 2 times a day. 12 units at night 17 units in the morning.  Dispense: 1 Vial; Refill: 0

## 2017-08-24 ENCOUNTER — APPOINTMENT (OUTPATIENT)
Dept: WOUND CARE | Facility: MEDICAL CENTER | Age: 77
End: 2017-08-24
Attending: INTERNAL MEDICINE
Payer: MEDICARE

## 2017-08-25 ENCOUNTER — OFFICE VISIT (OUTPATIENT)
Dept: WOUND CARE | Facility: MEDICAL CENTER | Age: 77
End: 2017-08-25
Attending: INTERNAL MEDICINE
Payer: MEDICARE

## 2017-08-25 PROCEDURE — 97602 WOUND(S) CARE NON-SELECTIVE: CPT

## 2017-08-25 NOTE — CERTIFICATION
Advanced Wound Care  [facility]  [address]  [address]  [place] [address]  [phone] Fax: [phone]  Re certification   For Certification Period: 8/25/17 - 9/25/17      Referring Physician: Edgardo Castano DO  Primary Physician:        Consulting Physicians: Dr. Hernandez        Wound(s): L ankle  Start of Care: 8/4/17       Subjective: none to report       HPI:  Pt reports he cut his leg badly, at work, in 1987, and he has had trouble with the area, off and on, ever since. Pt has CML, DM2, CKD, Hx prostate CA.               Pain: Pt denies pain           Allergies: Nkda      Objective:      Tests and Measures: 8/22/17 JOSE RAUL performed by Fatimah Green, with my supervision. LT ,  non compressible, ,  160/130 = 1.23  8/4/17 - Wound culture obtained  Pt reports blood sugars run between 80 and 140.    Orthotic, protective, supportive devices:     Fall Risk Assessment (neyda all that apply with an X): Completed at initial eval 08/04/2017              Wound Characteristics                                                    Location: L ankle   Initial Evaluation  Date: 8/4/17 Recertification Date:  8/25/17   Tissue Type and %: Deep red skin with several open, weeping areas along superior edge. 100% red viable open moist area   Periwound: Satellite wounds circumferentially to about 3 cm out. Erythema, hemosiderin staining   Drainage: Serous, moderate. Scant ss   Exposed structures None none   Wound Edges:   Indistinct.  irregular   Odor: None none   S&S of Infection:   Erythema erythema   Edema: 1+ to LE 1+ LE   Sensation: intact intact               Measurements: L ankle. Measured affected area. Initial Evaluation  Date: 8/4/17 Recertification Date: 8/25/2017  Entire area with scattered openings   Length (cm) 13 13   Width (cm) 10 8   Depth (cm) BELLA 0.1   Area (cm2) 130 104   Tract/undermine None none        Procedures:     Debridement:none selective with dry washcloth and no  rinse foam.    Cleansed with:  No rinse foam and wet washcloth to LLE                                                                  Periwound protected with: sween 24 and Nystatin   Primary dressing: Super absorb to cover area secured with hypafix   Other: Tubi F     Patient Education: Reviewed POC and decreasing appointment to once weekly. Patient will be contacted By Dr. La office regarding vascular follow up. Patient to call wound center for sooner appointment in the event that the dressing needs to be changed more often. Patient understands when to seek emergency medical attention secondary to decline of his wound.     Professional Collaboration:Dr. Martinez   Assessment:      Wound etiology: CVI    Wound Progress: Few small open areas.Dry flaky skin.     Rationale for Treatment:  Nystatin powder as this worked well recently for patient crusting, sween 24 to nourish dry flaky skin, tubi for compression.     Patient tolerance/compliance: Pt tolerated care well and expresses desire to care for wound as recommended.    Complicating factors: Diabetes, leukemia (and medication), chronic kidney disease, obesity    Need for ongoing Advanced Wound Care services: Needs advanced wound care for ongoing debridement, dressing selection, education.      Plan:      Treatment Plan and Recommendations:  Diagnosis/ICD9: S91.002D    Procedures/CPT: CSWD    Frequency: 1X/week      Treatment Goals: STG 2 Weeks  LTG 4 Weeks   Granulation Tissue: % %   Decrease Necrotic Tissue to: % %   Wound Phase:      Decrease Size by: 10% 20%   Periwound:      Decrease tracts/undermining by: % %   Decrease Pain:          At the time of each visit a thorough assessment of the patient is completed to assure the  appropriateness of our plan of care.  The dressings or modalities may need to be adapted   from the original plan to address any significant changes in the wound environment.          Clinician  Signature:_______________________________Date__________________      Physician Signature:______________________________Date:__________________

## 2017-08-26 NOTE — PROGRESS NOTES
Wound Consult Note:    Valentina Martinez  Date & Time note created:    8/25/2017   10:54 PM     Referred by: Shila Hernandez MD    Patient ID:   Name:             Sukumar Quiñonez   YOB: 1940  Age:                 76 y.o.  male   MRN:               [id number]                                                             Reason for Consult:      Persistent left ankle venous stasis wound.     History of Present Illness:    Sukumar Quiñonez is a 76year old male who presents with a difficult left ankle wound, now nearly healed.  Isaw him in 2006 and treated him for venous stasis with left greater and right small saphenous vein ablation.  He had reported longstanding skin change in the left medial ankleand had venous varices.  He underwent left saphenous vein ligation at an early age, in the 7th grade.  He is diligent about his skin and has had no recurrence of ulcers until recently.      Review of Systems:      Constitutional: Denies fevers, denies weight changes  Eyes: Denies changes in vision, no eye pain  Ears/Nose/Throat/Mouth: Denies nasal congestion or sore throat   Cardiovascular: Denies chest pain or  palpitations   Respiratory: Denies shortness of breath , Denies cough  Gastrointestinal/Hepatic: Denies abdominal pain, nausea, vomiting, diarrhea, constipation or GI bleeding   Genitourinary: Denies dysuria or frequency  Musculoskeletal/Rheum: Denies  joint pain and swelling, admits to edema, denies pain with walking  Skin: has rash around the ankle wound,  Admits to a  history of open wounds  Neurological: Denies headache, confusion, memory loss or focal weakness/parasthesias  Psychiatric: Denies mood disorder   Endocrine: Denies thyroid problems  Heme/Oncology/Lymph Nodes: Denies enlarged lymph nodes, denies brusing or known bleeding disorder  All other systems were reviewed and are negative (AMA/CMS criteria)                Past Medical History:   Past Medical History   Diagnosis Date   • Murmur, heart     • Heart burn    • Hypertension    • Diabetes 2005     TYPE 2   • Unspecified urinary incontinence    • Heart valve disease    • Indigestion    • Hemorrhoid    • Hyperlipidemia    • Cancer (CMS-HCC)      Prostate; Chronic myleloid lukemia   • Type II diabetes mellitus, well controlled (CMS-HCC) 1/18/2017       Past Surgical History:  Past Surgical History   Procedure Laterality Date   • Brachy therapy  06   • Circumcision adult  8/17/2009     Performed by MIGUEL NEVILLE at SURGERY [facility] ORS   • Trans urethral resection prostate  8/17/2009     Performed by MIGUEL NEVILLE at SURGERY [facility] ORS   • Cystoscopy  1/25/2010     Performed by MIGUEL NEVILLE at SURGERY [facility] OR[facility]   • Trans urethral resection prostate  1/25/2010     Performed by MIGUEL NEVILLE at SURGERY [facility] ORS   • Other abdominal surgery  2005     umbilical hernia repair   • Other  2007     vein stripping   • Cystoscopy  4/7/2011     Performed by MIGUEL NEVILLE at SURGERY [facility] ORS   • Sphincter prosthesis placement  4/7/2011     Performed by MIGUEL NEVILLE at SURGERY [facility] ORS   • Colon resection laparoscopic  8/30/2011     Performed by PRISCILLA ROGERS at SURGERY [facility] ORS   • Loi  1/30/2012     Performed by TREY TAPIA at ENDOSCOPY [facility] ORS   • Wide excision  8/12/2014     Performed by Veronica Ruth M.D. at SURGERY SAME DAY [facility]       Current Outpatient Medications:  Current Outpatient Prescriptions   Medication Sig Dispense Refill   • insulin glargine (LANTUS) 100 UNIT/ML Solution Inject 12-17 Units as instructed 2 times a day. 12 units at night 17 units in the morning. 1 Vial 0   • rosuvastatin (CRESTOR) 5 MG Tab TAKE ONE TABLET BY MOUTH IN THE EVENING 90 Tab 3   • ranitidine (ZANTAC) 150 MG Tab TAKE ONE TABLET BY MOUTH TWICE DAILY 180 Tab 3   • furosemide (LASIX) 40 MG Tab TAKE ONE TABLET BY MOUTH ONCE DAILY 90 Tab 3   • benazepril (LOTENSIN) 10 MG Tab TAKE ONE TABLET BY MOUTH ONCE DAILY  90 Tab 3   • doxazosin (CARDURA) 2 MG Tab TAKE TWO TABLETS BY MOUTH ONCE DAILY 180 Tab 3   • KLOR-CON M20 20 MEQ Tab CR TAKE ONE TABLET BY MOUTH TWICE DAILY 180 Tab 3   • imatinib (GLEEVEC) 100 MG TABS Take 300 mg by mouth every day.       No current facility-administered medications for this visit.       Medication Allergy:  Allergies   Allergen Reactions   • Nkda [No Known Drug Allergy]        Family History:  Family History   Problem Relation Age of Onset   • Other Mother 94     age.    • Heart Disease Father        Social History:  Social History     Social History   • Marital Status:      Spouse Name: N/A   • Number of Children: N/A   • Years of Education: N/A     Occupational History   • Not on file.     Social History Main Topics   • Smoking status: Never Smoker    • Smokeless tobacco: Never Used   • Alcohol Use: No   • Drug Use: No   • Sexual Activity:     Partners: Female     Other Topics Concern   • Not on file     Social History Narrative         Physical Exam:  Vitals/ General Appearance  There were no vitals taken for this visit.    Constitutional:   Well developed, Well nourished, No acute distress Obese  HENMT:  Normocephalic, Atraumatic, Oropharynx moist mucous membranes,   Neck:  Normal range of motion, No cervical tenderness,  no JVD.  Cardiovascular:  Normal heart rate, Normal rhythm, No murmur.   Extremities with intact distal pulses, no cyanosis, but edema at the left ankle  Lungs:  Breath sounds clear to auscultation bilaterally,  no crackles, no wheezing.   Abdomen: Bowel sounds normal, Soft, No tenderness,  Skin: Warm, Dry, No erythema, No rash, no induration.  Large pigmented area on the medial elft ankle.  There appears to be a  above the excoriated skin (?competence)  Neurologic: Alert & oriented x 3, No focal deficits noted, cranial nerves II through X are grossly intact.  Psychiatric: Affect normal, Judgment normal, Mood normal.    Imaging/Procedures Review:    Venous  imaging, limited by technology demonstrated multiple varices beneath the hyperpigmented skin and what appears to be a  at the upper aspect of the wound.   MDM (Assessment and Plan):     Patient Active Problem List    Diagnosis Date Noted   • Wound of left lower extremity 08/15/2017   • Wound infection 08/07/2017   • Hypoglycemia 05/26/2017   • Elevated TSH 05/26/2017   • Type 2 diabetes mellitus (CMS-HCC) 05/26/2017   • Morbid obesity with BMI of 40.0-44.9, adult (HCC) 05/08/2017   • Type II diabetes mellitus, well controlled (CMS-HCC) 01/18/2017   • HTN (hypertension) 01/27/2012   • Dyslipidemia 01/27/2012   • Aortic valve insufficiency 08/25/2011   • CML (chronic myelocytic leukemia) (CMS-HCC) 08/25/2011     Impression:  It has been 10 years since the procedure performed on Mr. Quiñonez's legs and it appears he has developed substantial recurrence.He will need a formal imaging study to detail this complex left leg and identify possible courses of treatment to reduce or eliminate future recurrence.     Plan:  Left lower extremity venous duplex exam and follow-up with me.     I spent a total of 30 minutes during this clinical encounter of which > 50% was devoted to counseling and coordinating care including review of records, pertinent lab data and studies, as well as discussing diagnostic evaluation and work up, planned therapeutic interventions and future disposition of care. Where indicated, the assessment and plan reflect discussion of patient with consultants, other healthcare providers, family members, and additional research needed to obtain further information in formulating the plan of care of this patient.     Valentina Martinez M.D.

## 2017-08-28 ENCOUNTER — APPOINTMENT (OUTPATIENT)
Dept: WOUND CARE | Facility: MEDICAL CENTER | Age: 77
End: 2017-08-28
Attending: INTERNAL MEDICINE
Payer: MEDICARE

## 2017-08-28 DIAGNOSIS — R60.0 LOCALIZED EDEMA: ICD-10-CM

## 2017-08-28 RX ORDER — POTASSIUM CHLORIDE 1500 MG/1
TABLET, EXTENDED RELEASE ORAL
Qty: 180 TAB | Refills: 3 | Status: SHIPPED | OUTPATIENT
Start: 2017-08-28

## 2017-09-01 ENCOUNTER — HOSPITAL ENCOUNTER (OUTPATIENT)
Dept: LAB | Facility: MEDICAL CENTER | Age: 77
End: 2017-09-01
Attending: INTERNAL MEDICINE
Payer: MEDICARE

## 2017-09-01 ENCOUNTER — HOSPITAL ENCOUNTER (OUTPATIENT)
Dept: LAB | Facility: MEDICAL CENTER | Age: 77
End: 2017-09-01
Attending: NURSE PRACTITIONER
Payer: MEDICARE

## 2017-09-01 ENCOUNTER — NON-PROVIDER VISIT (OUTPATIENT)
Dept: WOUND CARE | Facility: MEDICAL CENTER | Age: 77
End: 2017-09-01
Attending: INTERNAL MEDICINE
Payer: MEDICARE

## 2017-09-01 DIAGNOSIS — R79.89 ELEVATED TSH: ICD-10-CM

## 2017-09-01 LAB
25(OH)D3 SERPL-MCNC: 37 NG/ML (ref 30–100)
ALBUMIN SERPL BCP-MCNC: 4.1 G/DL (ref 3.2–4.9)
ALBUMIN SERPL BCP-MCNC: 4.4 G/DL (ref 3.2–4.9)
ALBUMIN/GLOB SERPL: 1.9 G/DL
ALP SERPL-CCNC: 45 U/L (ref 30–99)
ALT SERPL-CCNC: 15 U/L (ref 2–50)
ANION GAP SERPL CALC-SCNC: 9 MMOL/L (ref 0–11.9)
APPEARANCE UR: CLEAR
AST SERPL-CCNC: 21 U/L (ref 12–45)
BASOPHILS # BLD AUTO: 0.4 % (ref 0–1.8)
BASOPHILS # BLD AUTO: 0.6 % (ref 0–1.8)
BASOPHILS # BLD: 0.02 K/UL (ref 0–0.12)
BASOPHILS # BLD: 0.03 K/UL (ref 0–0.12)
BILIRUB SERPL-MCNC: 0.7 MG/DL (ref 0.1–1.5)
BILIRUB UR QL STRIP.AUTO: NEGATIVE
BUN SERPL-MCNC: 25 MG/DL (ref 8–22)
BUN SERPL-MCNC: 26 MG/DL (ref 8–22)
CALCIUM SERPL-MCNC: 9.2 MG/DL (ref 8.5–10.5)
CALCIUM SERPL-MCNC: 9.3 MG/DL (ref 8.5–10.5)
CHLORIDE SERPL-SCNC: 106 MMOL/L (ref 96–112)
CHLORIDE SERPL-SCNC: 107 MMOL/L (ref 96–112)
CO2 SERPL-SCNC: 24 MMOL/L (ref 20–33)
CO2 SERPL-SCNC: 24 MMOL/L (ref 20–33)
COLOR UR: YELLOW
CREAT SERPL-MCNC: 1.65 MG/DL (ref 0.5–1.4)
CREAT SERPL-MCNC: 1.66 MG/DL (ref 0.5–1.4)
CREAT UR-MCNC: 46.3 MG/DL
EOSINOPHIL # BLD AUTO: 0.26 K/UL (ref 0–0.51)
EOSINOPHIL # BLD AUTO: 0.31 K/UL (ref 0–0.51)
EOSINOPHIL NFR BLD: 4.7 % (ref 0–6.9)
EOSINOPHIL NFR BLD: 5.8 % (ref 0–6.9)
ERYTHROCYTE [DISTWIDTH] IN BLOOD BY AUTOMATED COUNT: 48.6 FL (ref 35.9–50)
ERYTHROCYTE [DISTWIDTH] IN BLOOD BY AUTOMATED COUNT: 48.9 FL (ref 35.9–50)
EST. AVERAGE GLUCOSE BLD GHB EST-MCNC: 189 MG/DL
GFR SERPL CREATININE-BSD FRML MDRD: 40 ML/MIN/1.73 M 2
GFR SERPL CREATININE-BSD FRML MDRD: 41 ML/MIN/1.73 M 2
GLOBULIN SER CALC-MCNC: 2.3 G/DL (ref 1.9–3.5)
GLUCOSE SERPL-MCNC: 158 MG/DL (ref 65–99)
GLUCOSE SERPL-MCNC: 164 MG/DL (ref 65–99)
GLUCOSE UR STRIP.AUTO-MCNC: NEGATIVE MG/DL
HBA1C MFR BLD: 8.2 % (ref 0–5.6)
HCT VFR BLD AUTO: 38.7 % (ref 42–52)
HCT VFR BLD AUTO: 38.9 % (ref 42–52)
HGB BLD-MCNC: 12.7 G/DL (ref 14–18)
HGB BLD-MCNC: 12.7 G/DL (ref 14–18)
IMM GRANULOCYTES # BLD AUTO: 0.02 K/UL (ref 0–0.11)
IMM GRANULOCYTES # BLD AUTO: 0.02 K/UL (ref 0–0.11)
IMM GRANULOCYTES NFR BLD AUTO: 0.4 % (ref 0–0.9)
IMM GRANULOCYTES NFR BLD AUTO: 0.4 % (ref 0–0.9)
KETONES UR STRIP.AUTO-MCNC: NEGATIVE MG/DL
LEUKOCYTE ESTERASE UR QL STRIP.AUTO: NEGATIVE
LYMPHOCYTES # BLD AUTO: 0.96 K/UL (ref 1–4.8)
LYMPHOCYTES # BLD AUTO: 1 K/UL (ref 1–4.8)
LYMPHOCYTES NFR BLD: 17.9 % (ref 22–41)
LYMPHOCYTES NFR BLD: 17.9 % (ref 22–41)
MAGNESIUM SERPL-MCNC: 2 MG/DL (ref 1.5–2.5)
MCH RBC QN AUTO: 31.2 PG (ref 27–33)
MCH RBC QN AUTO: 31.4 PG (ref 27–33)
MCHC RBC AUTO-ENTMCNC: 32.6 G/DL (ref 33.7–35.3)
MCHC RBC AUTO-ENTMCNC: 32.8 G/DL (ref 33.7–35.3)
MCV RBC AUTO: 95.6 FL (ref 81.4–97.8)
MCV RBC AUTO: 95.8 FL (ref 81.4–97.8)
MICRO URNS: NORMAL
MONOCYTES # BLD AUTO: 0.41 K/UL (ref 0–0.85)
MONOCYTES # BLD AUTO: 0.45 K/UL (ref 0–0.85)
MONOCYTES NFR BLD AUTO: 7.6 % (ref 0–13.4)
MONOCYTES NFR BLD AUTO: 8.1 % (ref 0–13.4)
NEUTROPHILS # BLD AUTO: 3.63 K/UL (ref 1.82–7.42)
NEUTROPHILS # BLD AUTO: 3.84 K/UL (ref 1.82–7.42)
NEUTROPHILS NFR BLD: 67.7 % (ref 44–72)
NEUTROPHILS NFR BLD: 68.5 % (ref 44–72)
NITRITE UR QL STRIP.AUTO: NEGATIVE
NRBC # BLD AUTO: 0 K/UL
NRBC # BLD AUTO: 0 K/UL
NRBC BLD AUTO-RTO: 0 /100 WBC
NRBC BLD AUTO-RTO: 0 /100 WBC
PH UR STRIP.AUTO: 6 [PH]
PHOSPHATE SERPL-MCNC: 3.1 MG/DL (ref 2.5–4.5)
PLATELET # BLD AUTO: 121 K/UL (ref 164–446)
PLATELET # BLD AUTO: 126 K/UL (ref 164–446)
PMV BLD AUTO: 11.8 FL (ref 9–12.9)
PMV BLD AUTO: 11.8 FL (ref 9–12.9)
POTASSIUM SERPL-SCNC: 4.2 MMOL/L (ref 3.6–5.5)
POTASSIUM SERPL-SCNC: 4.2 MMOL/L (ref 3.6–5.5)
PROT SERPL-MCNC: 6.7 G/DL (ref 6–8.2)
PROT UR QL STRIP: NEGATIVE MG/DL
PROT UR-MCNC: <4 MG/DL (ref 0–15)
PROT/CREAT UR: NORMAL MG/G (ref 15–68)
PTH-INTACT SERPL-MCNC: 98.3 PG/ML (ref 14–72)
RBC # BLD AUTO: 4.04 M/UL (ref 4.7–6.1)
RBC # BLD AUTO: 4.07 M/UL (ref 4.7–6.1)
RBC UR QL AUTO: NEGATIVE
SODIUM SERPL-SCNC: 139 MMOL/L (ref 135–145)
SODIUM SERPL-SCNC: 139 MMOL/L (ref 135–145)
SP GR UR STRIP.AUTO: 1.01
T4 FREE SERPL-MCNC: 1.02 NG/DL (ref 0.53–1.43)
THYROPEROXIDASE AB SERPL-ACNC: 0.9 IU/ML (ref 0–9)
TSH SERPL DL<=0.005 MIU/L-ACNC: 2.47 UIU/ML (ref 0.3–3.7)
URATE SERPL-MCNC: 7.6 MG/DL (ref 2.5–8.3)
UROBILINOGEN UR STRIP.AUTO-MCNC: 0.2 MG/DL
WBC # BLD AUTO: 5.4 K/UL (ref 4.8–10.8)
WBC # BLD AUTO: 5.6 K/UL (ref 4.8–10.8)

## 2017-09-01 PROCEDURE — 82570 ASSAY OF URINE CREATININE: CPT

## 2017-09-01 PROCEDURE — 84439 ASSAY OF FREE THYROXINE: CPT

## 2017-09-01 PROCEDURE — 80053 COMPREHEN METABOLIC PANEL: CPT

## 2017-09-01 PROCEDURE — 84550 ASSAY OF BLOOD/URIC ACID: CPT

## 2017-09-01 PROCEDURE — 86376 MICROSOMAL ANTIBODY EACH: CPT

## 2017-09-01 PROCEDURE — 81206 BCR/ABL1 GENE MAJOR BP: CPT

## 2017-09-01 PROCEDURE — 83970 ASSAY OF PARATHORMONE: CPT

## 2017-09-01 PROCEDURE — 84156 ASSAY OF PROTEIN URINE: CPT

## 2017-09-01 PROCEDURE — 83036 HEMOGLOBIN GLYCOSYLATED A1C: CPT

## 2017-09-01 PROCEDURE — 83735 ASSAY OF MAGNESIUM: CPT

## 2017-09-01 PROCEDURE — 82306 VITAMIN D 25 HYDROXY: CPT

## 2017-09-01 PROCEDURE — 85025 COMPLETE CBC W/AUTO DIFF WBC: CPT

## 2017-09-01 PROCEDURE — 97602 WOUND(S) CARE NON-SELECTIVE: CPT

## 2017-09-01 PROCEDURE — 85025 COMPLETE CBC W/AUTO DIFF WBC: CPT | Mod: 91

## 2017-09-01 PROCEDURE — 84443 ASSAY THYROID STIM HORMONE: CPT

## 2017-09-01 PROCEDURE — 36415 COLL VENOUS BLD VENIPUNCTURE: CPT

## 2017-09-01 PROCEDURE — 80069 RENAL FUNCTION PANEL: CPT

## 2017-09-01 PROCEDURE — 81003 URINALYSIS AUTO W/O SCOPE: CPT

## 2017-09-01 NOTE — WOUND TEAM
Advanced Wound Care  [facility]  [address]  [address]  [place] [address]  [phone] Fax: [phone]    Encounter Note   For Certification Period: 8/25/17 - 9/25/17      Referring Physician: Edgardo Castano DO  Primary Physician:        Consulting Physicians: Dr. Hernandez        Wound(s): L ankle  Start of Care: 8/4/17       Subjective: none to report       HPI:  Pt reports he cut his leg badly, at work, in 1987, and he has had trouble with the area, off and on, ever since. Pt has CML, DM2, CKD, Hx prostate CA.               Pain: Pt denies pain. Tenderness at proximal wound edge.           Allergies: Nkda      Objective:      Tests and Measures:  8/22/17 JOSE RAUL performed by Fatimah Green, with my supervision. LT ,  non compressible, ,  160/130 = 1.23  8/4/17 - Wound culture obtained  Pt reports blood sugars run between 80 and 140.    Orthotic, protective, supportive devices: Single point cane    Fall Risk Assessment (neyda all that apply with an X): Completed at initial eval 08/04/2017              Wound Characteristics                                                    Location: L ankle   Initial Evaluation  Date: 8/4/17 Recertification Date:  8/25/17 Encounter Date: 09/01/2017   Tissue Type and %: Deep red skin with several open, weeping areas along superior edge. 100% red viable open moist area 100% red viable with scattered open areas.   Periwound: Satellite wounds circumferentially to about 3 cm out. Erythema, hemosiderin staining Erythema, hemosiderin staining   Drainage: Serous, moderate. Scant ss Min ss   Exposed structures None none None   Wound Edges:   Indistinct.  irregular Irregular, open   Odor: None none None   S&S of Infection:   Erythema erythema Erythema   Edema: 1+ to LE 1+ LE 1+    Sensation: intact intact Intact               Measurements: L ankle. Measured affected area. Initial Evaluation  Date: 8/4/17 Recertification Date: 8/25/2017  Entire area  with scattered openings Encounter Date: 09/01/2017   Length (cm) 13 13 13.0   Width (cm) 10 8 7.0   Depth (cm) BELLA 0.1 0.1   Area (cm2) 130 104 91 cm2   Tract/undermine None none None        Procedures:     Debridement: None selective with cotton tipped applicator, dry washcloth and no rinse foam.    Cleansed with:  No rinse foam and wet washcloth to LLE                                                                  Periwound protected with: sween 24 and Nystatin   Primary dressing: Super absorb to cover area secured with hypafix   Other: Tubi F     Patient Education: POC discussed and rationale for dressing selection. Pt states he has still not heard from Dr. Martinez's office regarding vascular follow up. Instructed pt on s/s infection - chills, fever, malaise, NV, increased redness/swelling/pain/exudate - and to go to ER/Urgent Care; to keep dressing D/I, and continue visits to [facility] 1x/week for dressing management. Pt verbalizes understanding to all education.     Previous appt: Reviewed POC and decreasing appointment to once weekly. Patient will be contacted By Dr. La office regarding vascular follow up. Patient to call wound center for sooner appointment in the event that the dressing needs to be changed more often. Patient understands when to seek emergency medical attention secondary to decline of his wound.     Professional Collaboration: None today  Assessment:      Wound etiology: CVI    Wound Progress: Wound appears unchanged.     Rationale for Treatment:  Nystatin powder as this worked well recently for patient crusting, sween 24 to nourish dry flaky skin, tubi for compression.     Patient tolerance/compliance: Pt tolerated care well and expresses desire to care for wound as recommended.    Complicating factors: Diabetes, leukemia (and medication), chronic kidney disease, obesity    Need for ongoing Advanced Wound Care services: Needs advanced wound care for ongoing debridement, dressing  selection, education.      Plan:      Treatment Plan and Recommendations:  Diagnosis/ICD9: S91.002D    Procedures/CPT: CSWD    Frequency: 1X/week      Treatment Goals: STG 2 Weeks  LTG 4 Weeks   Granulation Tissue: % %   Decrease Necrotic Tissue to: % %   Wound Phase:      Decrease Size by: 10% 20%   Periwound:      Decrease tracts/undermining by: % %   Decrease Pain:          At the time of each visit a thorough assessment of the patient is completed to assure the  appropriateness of our plan of care.  The dressings or modalities may need to be adapted   from the original plan to address any significant changes in the wound environment.          Clinician Signature:_______________________________Date__________________      Physician Signature:______________________________Date:__________________

## 2017-09-04 ENCOUNTER — APPOINTMENT (OUTPATIENT)
Dept: WOUND CARE | Facility: MEDICAL CENTER | Age: 77
End: 2017-09-04
Attending: INTERNAL MEDICINE
Payer: MEDICARE

## 2017-09-05 ENCOUNTER — TELEPHONE (OUTPATIENT)
Dept: MEDICAL GROUP | Facility: CLINIC | Age: 77
End: 2017-09-05

## 2017-09-05 NOTE — TELEPHONE ENCOUNTER
Future Appointments       Provider Department Center    9/6/2017 10:20 AM Edgardo Castano D.O[facility]    9/7/2017 9:30 AM A[facility]elles [facility] [address] [place] [address]    9/14/2017 9:30 AM Alyce Telles [facility] [address] [place] [address]    9/21/2017 9:30 AM Teri Butler R.N. [facility] [address]    9/28/2017 10:00 AM Teri Butler R.N[facility] [address]          ESTABLISHED PATIENT PRE-VISIT PLANNING     Note: Patient will not be contacted if there is no indication to call. PT was not Contacted.    1.    Reviewed note from last office visit with PCP: YES Last office visit: 8/22/17    2.  If any orders were placed at last visit, do we have Results/Consult Notes?        •  Labs - Labs were not ordered at last office visit.        •  Imaging - Imaging was not ordered at last office visit.        •  Referrals - No referrals were ordered at last office visit.     3.  Immunizations were updated in Epic using WebIZ?: Epic matches WebIZ       •  Web Iz Recommendations:   Td (adult), adsorbed   Zoster (Shingles)   Influenza w/preserv.         4.  Patient is due for the following Health Maintenance Topics:   Health Maintenance Due   Topic Date Due   • Annual Wellness Visit  1940   • IMM ZOSTER VACCINE  10/30/1999   • IMM INFLUENZA (1) 09/01/2017           5.  Patient was not informed to arrive 15 min prior to their scheduled appointment and bring in their medication bottles.

## 2017-09-06 ENCOUNTER — OFFICE VISIT (OUTPATIENT)
Dept: MEDICAL GROUP | Facility: CLINIC | Age: 77
End: 2017-09-06
Payer: MEDICARE

## 2017-09-06 VITALS
OXYGEN SATURATION: 95 % | TEMPERATURE: 98 F | SYSTOLIC BLOOD PRESSURE: 130 MMHG | WEIGHT: 297.6 LBS | BODY MASS INDEX: 39.44 KG/M2 | HEIGHT: 73 IN | DIASTOLIC BLOOD PRESSURE: 80 MMHG | RESPIRATION RATE: 18 BRPM | HEART RATE: 64 BPM

## 2017-09-06 DIAGNOSIS — G89.29 CHRONIC PAIN OF LEFT KNEE: ICD-10-CM

## 2017-09-06 DIAGNOSIS — M25.562 CHRONIC PAIN OF LEFT KNEE: ICD-10-CM

## 2017-09-06 DIAGNOSIS — I87.2 VENOUS STASIS DERMATITIS OF LEFT LOWER EXTREMITY: ICD-10-CM

## 2017-09-06 PROCEDURE — 99213 OFFICE O/P EST LOW 20 MIN: CPT | Performed by: INTERNAL MEDICINE

## 2017-09-06 ASSESSMENT — PAIN SCALES - GENERAL: PAINLEVEL: 8=MODERATE-SEVERE PAIN

## 2017-09-07 ENCOUNTER — NON-PROVIDER VISIT (OUTPATIENT)
Dept: WOUND CARE | Facility: MEDICAL CENTER | Age: 77
End: 2017-09-07
Attending: INTERNAL MEDICINE
Payer: MEDICARE

## 2017-09-07 PROCEDURE — 97597 DBRDMT OPN WND 1ST 20 CM/<: CPT

## 2017-09-07 NOTE — PROGRESS NOTES
CC: Sukumar Quiñonez is a 76 y.o. male is suffering from   Chief Complaint   Patient presents with   • Follow-Up         SUBJECTIVE:  1. Chronic pain of left knee  Lata is here for follow-up, has a history of chronic left knee pain and discomfort, I discussed injecting his knee, but with a history of stasis dermatitis in an active infection I feel this would be ill advised. I've ordered an MRI of his knee.    2. Venous stasis dermatitis of left lower extremity  Patient with venous stasis dermatitis left lower extremity, the wounds appear to be healing. Dressings were removed today wound was inspected        Past social, family, history:   Social History   Substance Use Topics   • Smoking status: Never Smoker   • Smokeless tobacco: Never Used   • Alcohol use No         MEDICATIONS:    Current Outpatient Prescriptions:   •  KLOR-CON M20 20 MEQ Tab CR, TAKE ONE TABLET BY MOUTH TWICE DAILY, Disp: 180 Tab, Rfl: 3  •  insulin glargine (LANTUS) 100 UNIT/ML Solution, Inject 12-17 Units as instructed 2 times a day. 12 units at night 17 units in the morning., Disp: 1 Vial, Rfl: 0  •  rosuvastatin (CRESTOR) 5 MG Tab, TAKE ONE TABLET BY MOUTH IN THE EVENING, Disp: 90 Tab, Rfl: 3  •  ranitidine (ZANTAC) 150 MG Tab, TAKE ONE TABLET BY MOUTH TWICE DAILY, Disp: 180 Tab, Rfl: 3  •  furosemide (LASIX) 40 MG Tab, TAKE ONE TABLET BY MOUTH ONCE DAILY, Disp: 90 Tab, Rfl: 3  •  benazepril (LOTENSIN) 10 MG Tab, TAKE ONE TABLET BY MOUTH ONCE DAILY, Disp: 90 Tab, Rfl: 3  •  doxazosin (CARDURA) 2 MG Tab, TAKE TWO TABLETS BY MOUTH ONCE DAILY, Disp: 180 Tab, Rfl: 3  •  imatinib (GLEEVEC) 100 MG TABS, Take 300 mg by mouth every day., Disp: , Rfl:     PROBLEMS:  Patient Active Problem List    Diagnosis Date Noted   • Wound of left lower extremity 08/15/2017   • Wound infection 08/07/2017   • Hypoglycemia 05/26/2017   • Elevated TSH 05/26/2017   • Type 2 diabetes mellitus (CMS-HCC) 05/26/2017   • Morbid obesity with BMI of 40.0-44.9,  "adult ([facility]) 05/08/2017   • Type II diabetes mellitus, well controlled (CMS-HCC) 01/18/2017   • HTN (hypertension) 01/27/2012   • Dyslipidemia 01/27/2012   • Aortic valve insufficiency 08/25/2011   • CML (chronic myelocytic leukemia) (CMS-HCC) 08/25/2011       REVIEW OF SYSTEMS:  Gen.:  No Nausea, Vomiting, fever, Chills.  Heart: No chest pain.  Lungs:  No shortness of Breath.  Psychological: Carlos unusual Anxiety depression     PHYSICAL EXAM   Constitutional: Alert, cooperative, not in acute distress.  Cardiovascular:  Rate Rhythm is regular without murmurs rubs clicks.     Thorax & Lungs: Clear to auscultation, no wheezing, rhonchi, or rales  HENT: Normocephalic, Atraumatic.  Eyes: PERRLA, EOMI, Conjunctiva normal.   Neck: Trachia is midline no swelling of the thyroid.   Lymphatic: No lymphadenopathy noted.   Abdomin: Soft non-tender, no rebound, no guarding.   Skin: Warm, Dry, No erythema, stasis dermatitis with evidence of healing left medial posterior distal tibia.   Neurologic: Alert & oriented x 3, cranial nerves II through XII are intact, Normal motor function, Normal sensory function, No focal deficits noted.   Psychiatric: Affect normal, Judgment normal, Mood normal.     VITAL SIGNS:/80   Pulse 64   Temp 36.7 °C (98 °F)   Resp 18   Ht 1.854 m (6' 1\")   Wt (!) 135 kg (297 lb 9.6 oz)   SpO2 95%   BMI 39.26 kg/m²     Labs: Reviewed    Assessment:                                                     Plan:    1. Chronic pain of left knee  Chronic left knee pain MRI ordered recommended against injecting his knee until stasis dermatitis of left lower extremity is resolved  - MR-KNEE-W/O LEFT; Future    2. Venous stasis dermatitis of left lower extremity  Ongoing followed by wound care        "

## 2017-09-07 NOTE — WOUND TEAM
"Advanced Wound Care  [facility]  [address]  [address]  [place] [address]  [phone] Fax: [phone]    Encounter Note   For Certification Period: 8/25/17 - 9/25/17      Referring Physician: Edgardo Castano DO  Primary Physician:      same  Consulting Physicians: Dr. Hernandez        Wound(s): L ankle  Start of Care: 8/4/17       Subjective: seen by PCP who applied silvadene, telfa to area \"It was all he had\"       HPI:  Pt referred to [facility] by PCP for treatment of L ankle wound. Onset: 1987:Pt reports he cut his leg at work, and he has had open wounds in  the area since. 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               Pain: none.           Allergies: Nkda      Objective:      Tests and Measures:  8/22/17 JOSE RAUL performed by Fatimah Green, with my supervision. LT ,  non compressible, ,  160/130 = 1.23  8/4/17 - Wound culture obtained, (+) treated w/amox, pt states was ineffective  Pt reports blood sugars run between 80 and 140.    Orthotic, protective, supportive devices: Single point cane    Fall Risk Assessment : Completed at initial eval 08/04/2017 (+) fall risk              Wound Characteristics                                                    Location: L ankle   Initial Evaluation  Date: 8/4/17 Recertification Date:  8/25/17 Encounter Date: 09/07/2017   Tissue Type and %: Deep red skin with several open, weeping areas along superior edge. 100% red viable open moist area 100% dark red denuded uneven area w/o open wound beds.   Periwound: Satellite wounds circumferentially to about 3 cm out. Erythema, hemosiderin staining Dark red erythema, hemosiderin staining   Drainage: Serous, moderate. Scant ss Min ss   Exposed structures None none None   Wound Edges:   Indistinct.  irregular Irregular, open   Odor: None none None   S&S of Infection:   Erythema erythema Erythema   Edema: 1+ to LE 1+ LE 1+    Sensation: intact intact Intact               Measurements: L " ankle. Measured affected area. Initial Evaluation  Date: 8/4/17 Recertification Date: 8/25/2017  Entire area with scattered openings Encounter Date: 09/01/2017   Length (cm) 13 13 13.0   Width (cm) 10 8 7.0   Depth (cm) BELLA 0.1 0.1   Area (cm2) 130 104 91 cm2   Tract/undermine None none None        Procedures:     Debridement: Selective using forceps to remove 6cm2 non-viable tissue, flaky .    Cleansed with:  No rinse foam and wet washcloth to LLE                                                                  Periwound protected with: skin prep   Primary dressing: Arglaes powder, nystatin, thin layer TAC covered w/calcium alginate x2, ABD,kerlix secured with hypafix   Other: Tubi F     Patient Education: skin/wound care-instr pt to shower q 48 hr , avoid scrubbing area, rinse w/saline , pat dry w/gauze. Apply nystatin and OTC ant-fungal powder, cover w/ABD, kerlix, tubi-    Professional Collaboration: SHANON Corona in to observe wound, rec vascular consult. Pt seen prev @ [facility] by Dr. Hernandez; waiting for call for scheduling from Dr. Hernandez's office. Email to Dr. Barrios to inform pt has not yet been contacted by her office.  Assessment:      Wound etiology: CVI    Wound Progress: Large area denuded dark red erythema w/o visible open wound     Rationale for Treatment: Arglaes powder to reduce bioburden. Nystatin powder as anti-fungal.  Since onset wound care: effective for patient crusting, sween 24 to nourish dry flaky skin, tubi for compression.     Patient tolerance/compliance: Pt tolerated care well and expresses desire to care for wound as recommended.    Complicating factors: Diabetes, leukemia (and medication), chronic kidney disease, obesity    Need for ongoing Advanced Wound Care services: Needs advanced wound care for ongoing debridement, dressing selection, education.      Plan:      Treatment Plan and Recommendations:  Diagnosis/ICD9: S91.002D    Procedures/CPT: CSWD    Frequency:  1X/week      Treatment Goals: STG 2 Weeks  LTG 4 Weeks   Granulation Tissue: % %   Decrease Necrotic Tissue to: % %   Wound Phase:      Decrease Size by: 10% 20%   Periwound:      Decrease tracts/undermining by: % %   Decrease Pain:          At the time of each visit a thorough assessment of the patient is completed to assure the  appropriateness of our plan of care.  The dressings or modalities may need to be adapted   from the original plan to address any significant changes in the wound environment.          Clinician Signature:_______________________________Date__________________      Physician Signature:______________________________Date:__________________

## 2017-09-09 LAB
PATHOLOGY STUDY: ABNORMAL
SPECIMEN SOURCE: ABNORMAL
T(ABL1,BCR)P210 BLD/T QL: DETECTED
T(ABL1,BCR)P210/CONTROL (IS) BLD/T: 0.02 %
T(ABL1,BCR)P210/CONTROL BLD/T: 0 %

## 2017-09-11 ENCOUNTER — APPOINTMENT (OUTPATIENT)
Dept: WOUND CARE | Facility: MEDICAL CENTER | Age: 77
End: 2017-09-11
Attending: INTERNAL MEDICINE
Payer: MEDICARE

## 2017-09-14 ENCOUNTER — NON-PROVIDER VISIT (OUTPATIENT)
Dept: WOUND CARE | Facility: MEDICAL CENTER | Age: 77
End: 2017-09-14
Attending: INTERNAL MEDICINE
Payer: MEDICARE

## 2017-09-14 PROCEDURE — 97597 DBRDMT OPN WND 1ST 20 CM/<: CPT

## 2017-09-14 NOTE — WOUND TEAM
"Advanced Wound Care  [facility]  [address]  [address]  [place] [address]  [phone] Fax: [phone]    Encounter Note   For Certification Period: 8/25/17 - 9/25/17      Referring Physician: Edgardo Castano DO  Primary Physician:      same  Consulting Physicians: Dr. Hernandez        Wound(s): L ankle  Start of Care: 8/4/17       Subjective: seen by PCP who applied silvadene, telfa to area \"It was all he had\"       HPI:  Pt referred to [facility] by PCP for treatment of L ankle wound. Onset: 1987:Pt reports he cut his leg at work, and he has had open wounds in  the area since. 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               Pain: none.      Current Outpatient Prescriptions on File Prior to Visit   Medication Sig Dispense Refill   • KLOR-CON M20 20 MEQ Tab CR TAKE ONE TABLET BY MOUTH TWICE DAILY 180 Tab 3   • insulin glargine (LANTUS) 100 UNIT/ML Solution Inject 12-17 Units as instructed 2 times a day. 12 units at night 17 units in the morning. 1 Vial 0   • rosuvastatin (CRESTOR) 5 MG Tab TAKE ONE TABLET BY MOUTH IN THE EVENING 90 Tab 3   • ranitidine (ZANTAC) 150 MG Tab TAKE ONE TABLET BY MOUTH TWICE DAILY 180 Tab 3   • furosemide (LASIX) 40 MG Tab TAKE ONE TABLET BY MOUTH ONCE DAILY 90 Tab 3   • benazepril (LOTENSIN) 10 MG Tab TAKE ONE TABLET BY MOUTH ONCE DAILY 90 Tab 3   • doxazosin (CARDURA) 2 MG Tab TAKE TWO TABLETS BY MOUTH ONCE DAILY 180 Tab 3   • imatinib (GLEEVEC) 100 MG TABS Take 300 mg by mouth every day.       No current facility-administered medications on file prior to visit.      Reviewed medications with pt today 9/14. No changes. Not currently on abx.          Allergies: Nkda      Objective:      Tests and Measures: pt asking about results 8/4 culture. Reviewed with pt and that ABX were ordered; he confirmed he picked them up and took amox for one week. Stated it looked a little better.    8/22/17 JOSE RAUL performed by Fatimah Green, with my supervision. LT ,  non " compressible, ,  160/130 = 1.23  8/4/17 - Wound culture obtained, (+) treated w/amox, pt states was ineffective  Pt reports blood sugars run between 80 and 140.    Orthotic, protective, supportive devices: Single point cane    Fall Risk Assessment : Completed at initial eval 08/04/2017 (+) fall risk              Wound Characteristics                                                    Location: L ankle   Initial Evaluation  Date: 8/4/17 Recertification Date:  8/25/17 Encounter Date: 09/14/2017   Tissue Type and %: Deep red skin with several open, weeping areas along superior edge. 100% red viable open moist area 100% dark red denuded uneven area w/o 2 small open areas.   Periwound: Satellite wounds circumferentially to about 3 cm out. Erythema, hemosiderin staining Dark red erythema, hemosiderin staining, flaky   Drainage: Serous, moderate. Scant ss Min ss   Exposed structures None none None   Wound Edges:   Indistinct.  irregular Irregular, open   Odor: None none None   S&S of Infection:   Erythema erythema Erythema, edema   Edema: 1+ to LE 1+ LE 2+    Sensation: intact intact Intact               Measurements: L ankle. Measured affected area. Initial Evaluation  Date: 8/4/17 Recertification Date: 8/25/2017  Entire area with scattered openings Encounter Date: 09/01/2017   Length (cm) 13 13 13.0   Width (cm) 10 8 7.0   Depth (cm) BELLA 0.1 0.1   Area (cm2) 130 104 91 cm2   Tract/undermine None none None        Procedures:     Debridement: Selective using forceps to remove ~2cm2 non-viable tissue, flaky .    Cleansed with:  No rinse foam and wet washcloth to LLE                                                                  Periwound protected with: no sting skin prep crusted with nystatin   Primary dressing: Arglaes powder,  thin layer TAC covered w/calcium alginate x2, kerlix secured with hypafix   Other: Tubi F     Patient Education: educated on current status of wound and POC. Instructed to keep tubi   the same length as provided and not cut to keep dressing in place as edema has increased to leg superior and inferior to tubi. Reviewed instructions below as pt stated he did not remember that. Reviewed culture results from 8/4 as requested; pt verified he did  and take abx as prescribed. Pt verbalized understanding and agreement to education and instruction. Pt verbalized that he saw Dr. Martinez last week and is waiting on a return call from  to establish plan.    skin/wound care-instr pt to shower q 48 hr , avoid scrubbing area, rinse w/saline , pat dry w/gauze. Apply nystatin and OTC ant-fungal powder, cover w/ABD, kerlix, tubi-    Professional Collaboration: none today  Assessment:      Wound etiology: CVI    Wound Progress: Large area denuded dark red erythema with 2 small visible open wounds.     Rationale for Treatment: Arglaes powder to reduce bioburden. Nystatin powder as anti-fungal.  Since onset wound care: effective for patient crusting,  tubi for compression.     Patient tolerance/compliance: Pt tolerated care well and expresses desire to care for wound as recommended.    Complicating factors: Diabetes, leukemia (and medication), chronic kidney disease, obesity    Need for ongoing Advanced Wound Care services: Needs advanced wound care for ongoing debridement, dressing selection, education.      Plan:      Treatment Plan and Recommendations:  Diagnosis/ICD9: S91.002D    Procedures/CPT: CSWD    Frequency: 1X/week      Treatment Goals: STG 2 Weeks  LTG 4 Weeks   Granulation Tissue: % %   Decrease Necrotic Tissue to: % %   Wound Phase:      Decrease Size by: 10% 20%   Periwound:      Decrease tracts/undermining by: % %   Decrease Pain:          At the time of each visit a thorough assessment of the patient is completed to assure the  appropriateness of our plan of care.  The dressings or modalities may need to be adapted   from the original plan to address any significant changes in the  wound environment.          Clinician Signature:_______________________________Date__________________      Physician Signature:______________________________Date:__________________

## 2017-09-15 ENCOUNTER — HOSPITAL ENCOUNTER (OUTPATIENT)
Dept: RADIOLOGY | Facility: MEDICAL CENTER | Age: 77
End: 2017-09-15
Attending: INTERNAL MEDICINE
Payer: MEDICARE

## 2017-09-15 DIAGNOSIS — M25.562 LEFT KNEE PAIN, UNSPECIFIED CHRONICITY: ICD-10-CM

## 2017-09-15 DIAGNOSIS — M25.562 CHRONIC PAIN OF LEFT KNEE: ICD-10-CM

## 2017-09-15 DIAGNOSIS — G89.29 CHRONIC PAIN OF LEFT KNEE: ICD-10-CM

## 2017-09-15 PROCEDURE — 73721 MRI JNT OF LWR EXTRE W/O DYE: CPT | Mod: LT

## 2017-09-18 ENCOUNTER — TELEPHONE (OUTPATIENT)
Dept: MEDICAL GROUP | Facility: CLINIC | Age: 77
End: 2017-09-18

## 2017-09-18 ENCOUNTER — APPOINTMENT (OUTPATIENT)
Dept: WOUND CARE | Facility: MEDICAL CENTER | Age: 77
End: 2017-09-18
Attending: INTERNAL MEDICINE
Payer: MEDICARE

## 2017-09-18 DIAGNOSIS — G89.29 CHRONIC PAIN OF LEFT KNEE: ICD-10-CM

## 2017-09-18 DIAGNOSIS — M25.562 CHRONIC PAIN OF LEFT KNEE: ICD-10-CM

## 2017-09-18 NOTE — TELEPHONE ENCOUNTER
1.Name: Sukumar Mode Page                                        Call Back Number: [phone] (home)         Patient approves a detailed voicemail message: N\A    Patient notified of results and referral. Patient requested for referral to be changed to Dr Bobo. Please change referral, thank you.

## 2017-09-18 NOTE — TELEPHONE ENCOUNTER
----- Message from Edgardo Castano D.O. sent at 9/15/2017 11:29 AM PDT -----  Please call Ez let him know that a referral has been written to Edgardo Chavez,     ----- Message -----  From: Intf, Radiant In  Sent: 9/15/2017  10:33 AM  To: Edgardo Castano D.O.

## 2017-09-21 ENCOUNTER — NON-PROVIDER VISIT (OUTPATIENT)
Dept: WOUND CARE | Facility: MEDICAL CENTER | Age: 77
End: 2017-09-21
Attending: INTERNAL MEDICINE
Payer: MEDICARE

## 2017-09-21 PROCEDURE — 99212 OFFICE O/P EST SF 10 MIN: CPT

## 2017-09-21 NOTE — WOUND TEAM
"Advanced Wound Care  [facility]  [address]  [address]  [place] [address]  [phone] Fax: [phone]    Encounter Note   For Certification Period: 8/25/17 - 9/25/17      Referring Physician: Edgardo Castano DO  Primary Physician:      same  Consulting Physicians: Dr. Hernandez        Wound(s): L ankle  Start of Care: 8/4/17       Subjective: seen by PCP who applied silvadene, telfa to area \"It was all he had\"       HPI:  Pt referred to [facility] by PCP for treatment of L ankle wound. Onset: 1987:Pt reports he cut his leg at work, and he has had open wounds in  the area since. 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               Pain: none.      Medications: no changes today, per pt.         Allergies: Nkda      Objective:      Tests and Measures:   pt asking about results 8/4 culture. Reviewed with pt and that ABX were ordered; he confirmed he picked them up and took amox for one week. Stated it looked a little better.  8/22/17 JOSE RAUL performed by Fatimah Green, with my supervision. LT ,  non compressible, ,  160/130 = 1.23  8/4/17 - Wound culture obtained, (+) treated w/amox, pt states was ineffective  Pt reports blood sugars run between 80 and 140.    Orthotic, protective, supportive devices: none today    Fall Risk Assessment : Completed at initial eval 08/04/2017 (+) fall risk              Wound Characteristics                                                    Location: L ankle   Initial Evaluation  Date: 8/4/17 Recertification Date:  8/25/17 Encounter Date: 09/21/2017   Tissue Type and %: Deep red skin with several open, weeping areas along superior edge. 100% red viable open moist area Red, thickened area with a few weeping spots.   Periwound: Satellite wounds circumferentially to about 3 cm out. Erythema, hemosiderin staining Yousif   Drainage: Serous, moderate. Scant ss Min serous   Exposed structures None none None   Wound Edges:   Indistinct.  irregular " Irregular, indistinct   Odor: None none None   S&S of Infection:   Erythema erythema none   Edema: 1+ to LE 1+ LE 2+    Sensation: intact intact Intact               Measurements: L ankle. Measured affected area. Initial Evaluation  Date: 8/4/17 Recertification Date: 8/25/2017  Entire area with scattered openings Encounter Date: 09/21/2017   Length (cm) 13 13 13.0   Width (cm) 10 8 10   Depth (cm) BELLA 0.1 0.1   Area (cm2) 130 104 130cm2   Tract/undermine None none None        Procedures:     Debridement: none today   Cleansed with:  Ns/gauze                                                                  Periwound protected with: no sting skin prep crusted with nystatin   Primary dressing: calcium alginate to weeping areas, kerlix    Other: Tubi F     Patient Education: Reviewed poc and status of wound healing.  Reviewed importance of f/u re: venous disease.  Pt states he has not heard from Dr. Martinez's office.  Contacted Dr. Martinez who states she will have her office contact pt to schedule venous duplex scan and f/u.  I relayed this info to pt.      Professional Collaboration: Dr. Martinez regarding vascular f/u.  Assessment:      Wound etiology: CVI    Wound Progress: Wound area improving, minimal open areas, less drainage.    Rationale for Treatment: Nystatin powder for antifungal, then dry dressing to keep area dry/suppress fungal growth.  Tubi for compression, edema control.    Patient tolerance/compliance: Pt compliant with poc and appts.    Complicating factors: Diabetes, leukemia (and medication), chronic kidney disease, obesity    Need for ongoing Advanced Wound Care services: Needs advanced wound care for ongoing debridement, dressing selection, education.      Plan:      Treatment Plan and Recommendations:  Diagnosis/ICD9: S91.002D    Procedures/CPT: CSWD; visit level 2    Frequency: 1X/week      Treatment Goals: STG 2 Weeks  LTG 4 Weeks   Granulation Tissue: % %   Decrease Necrotic Tissue to: % %    Wound Phase:      Decrease Size by: 10% 20%   Periwound:      Decrease tracts/undermining by: % %   Decrease Pain:          At the time of each visit a thorough assessment of the patient is completed to assure the  appropriateness of our plan of care.  The dressings or modalities may need to be adapted   from the original plan to address any significant changes in the wound environment.          Clinician Signature:_______________________________Date__________________      Physician Signature:______________________________Date:__________________

## 2017-09-27 ENCOUNTER — OFFICE VISIT (OUTPATIENT)
Dept: MEDICAL GROUP | Facility: CLINIC | Age: 77
End: 2017-09-27
Payer: MEDICARE

## 2017-09-27 VITALS
OXYGEN SATURATION: 98 % | HEIGHT: 73 IN | BODY MASS INDEX: 40.44 KG/M2 | SYSTOLIC BLOOD PRESSURE: 128 MMHG | DIASTOLIC BLOOD PRESSURE: 70 MMHG | TEMPERATURE: 98.1 F | WEIGHT: 305.1 LBS | RESPIRATION RATE: 18 BRPM | HEART RATE: 96 BPM

## 2017-09-27 DIAGNOSIS — Z23 NEED FOR IMMUNIZATION AGAINST INFLUENZA: ICD-10-CM

## 2017-09-27 DIAGNOSIS — G89.29 CHRONIC PAIN OF LEFT KNEE: ICD-10-CM

## 2017-09-27 DIAGNOSIS — C92.10 CML (CHRONIC MYELOCYTIC LEUKEMIA) (HCC): Chronic | ICD-10-CM

## 2017-09-27 DIAGNOSIS — M25.562 CHRONIC PAIN OF LEFT KNEE: ICD-10-CM

## 2017-09-27 PROCEDURE — 90662 IIV NO PRSV INCREASED AG IM: CPT | Performed by: INTERNAL MEDICINE

## 2017-09-27 PROCEDURE — 99214 OFFICE O/P EST MOD 30 MIN: CPT | Mod: 25 | Performed by: INTERNAL MEDICINE

## 2017-09-27 PROCEDURE — G0008 ADMIN INFLUENZA VIRUS VAC: HCPCS | Performed by: INTERNAL MEDICINE

## 2017-09-27 ASSESSMENT — PAIN SCALES - GENERAL: PAINLEVEL: 8=MODERATE-SEVERE PAIN

## 2017-09-27 NOTE — LETTER
September 27, 2017        Patient: Sukumar Quiñonez   YOB: 1940   Date of Visit: 9/27/2017     Raul Bobo MD      Dear Dr. Bobo:    It is my opinion that Sukumar Quiñonez is medically cleared for left knee arthroscopic surgery on October 24th, 2017.    If you have any questions or concerns, please don't hesitate to call.        Sincerely,          Edgardo Castano D.O.      [address] [id number] [phone] (Phone)  [phone] (Fax)

## 2017-09-28 ENCOUNTER — NON-PROVIDER VISIT (OUTPATIENT)
Dept: WOUND CARE | Facility: MEDICAL CENTER | Age: 77
End: 2017-09-28
Attending: INTERNAL MEDICINE
Payer: MEDICARE

## 2017-09-28 PROCEDURE — 97602 WOUND(S) CARE NON-SELECTIVE: CPT

## 2017-09-28 RX ORDER — ASCORBIC ACID 500 MG
500 TABLET ORAL DAILY
COMMUNITY

## 2017-09-28 NOTE — PROGRESS NOTES
CC: Sukumar Quiñonez is a 76 y.o. male is suffering from   Chief Complaint   Patient presents with   • Follow-Up         SUBJECTIVE:  1. Need for immunization against influenza  Patient's here for follow-up, is in need of an influenza vaccination which was given.    2. Chronic pain of left knee  Patient needs a surgical release for arthroscopic knee surgery. Patient denies ever having a heart attack stroke seizures, is a diabetic on insulin but has no history of an elevated creatinine greater than 2.0. Current medical diagnosis and treatment was reviewed.     3. CML (chronic myelocytic leukemia) (CMS-HCC)  Ongoing chronic myelocytic leukemia, followed by oncology        Past social, family, history:   Social History   Substance Use Topics   • Smoking status: Never Smoker   • Smokeless tobacco: Never Used   • Alcohol use No         MEDICATIONS:    Current Outpatient Prescriptions:   •  KLOR-CON M20 20 MEQ Tab CR, TAKE ONE TABLET BY MOUTH TWICE DAILY, Disp: 180 Tab, Rfl: 3  •  insulin glargine (LANTUS) 100 UNIT/ML Solution, Inject 12-17 Units as instructed 2 times a day. 12 units at night 17 units in the morning., Disp: 1 Vial, Rfl: 0  •  rosuvastatin (CRESTOR) 5 MG Tab, TAKE ONE TABLET BY MOUTH IN THE EVENING, Disp: 90 Tab, Rfl: 3  •  ranitidine (ZANTAC) 150 MG Tab, TAKE ONE TABLET BY MOUTH TWICE DAILY, Disp: 180 Tab, Rfl: 3  •  furosemide (LASIX) 40 MG Tab, TAKE ONE TABLET BY MOUTH ONCE DAILY, Disp: 90 Tab, Rfl: 3  •  benazepril (LOTENSIN) 10 MG Tab, TAKE ONE TABLET BY MOUTH ONCE DAILY, Disp: 90 Tab, Rfl: 3  •  doxazosin (CARDURA) 2 MG Tab, TAKE TWO TABLETS BY MOUTH ONCE DAILY, Disp: 180 Tab, Rfl: 3  •  imatinib (GLEEVEC) 100 MG TABS, Take 300 mg by mouth every day., Disp: , Rfl:     PROBLEMS:  Patient Active Problem List    Diagnosis Date Noted   • Wound of left lower extremity 08/15/2017   • Wound infection 08/07/2017   • Hypoglycemia 05/26/2017   • Elevated TSH 05/26/2017   • Type 2 diabetes mellitus  "(CMS-HCC) 05/26/2017   • Morbid obesity with BMI of 40.0-44.9, adult ([facility]) 05/08/2017   • Type II diabetes mellitus, well controlled (CMS-HCC) 01/18/2017   • HTN (hypertension) 01/27/2012   • Dyslipidemia 01/27/2012   • Aortic valve insufficiency 08/25/2011   • CML (chronic myelocytic leukemia) (CMS-HCC) 08/25/2011       REVIEW OF SYSTEMS:  Gen.:  No Nausea, Vomiting, fever, Chills.  Heart: No chest pain.  Lungs:  No shortness of Breath.  Psychological: Carlos unusual Anxiety depression     PHYSICAL EXAM   Constitutional: Alert, cooperative, not in acute distress.  Cardiovascular:  Rate Rhythm is regular without murmurs rubs clicks.     Thorax & Lungs: Clear to auscultation, no wheezing, rhonchi, or rales  HENT: Normocephalic, Atraumatic.  Eyes: PERRLA, EOMI, Conjunctiva normal.   Neck: Trachia is midline no swelling of the thyroid.   Lymphatic: No lymphadenopathy noted.   Musculoskeletal: Continued complaints of left knee pain  Neurologic: Alert & oriented x 3, cranial nerves II through XII are intact, Normal motor function, Normal sensory function, No focal deficits noted.   Psychiatric: Affect normal, Judgment normal, Mood normal.     VITAL SIGNS:/70   Pulse 96   Temp 36.7 °C (98.1 °F)   Resp 18   Ht 1.854 m (6' 1\")   Wt (!) 138.4 kg (305 lb 1.6 oz)   SpO2 98%   BMI 40.25 kg/m²     Labs: Reviewed    Assessment:                                                     Plan:    1. Need for immunization against influenza  Vaccination given  - INFLUENZA VACCINE, HIGH DOSE (65+ ONLY)    2. Chronic pain of left knee  Surgical release dictated and faxed    3. CML (chronic myelocytic leukemia) (CMS-HCC)  Ongoing followed by oncology        "

## 2017-09-28 NOTE — CERTIFICATION
"Advanced Wound Care  [facility]  [address]  [address]  [place] [address]  [phone] Fax: [phone]    80 Day Recertification Summary   For Certification Period: 9/25/17 - 12/15/17      Referring Physician: Edgardo Castano DO  Primary Physician:      same  Consulting Physicians: Dr. Hernandez        Wound(s): L medial ankle  Start of Care: 8/4/17       Subjective: seen by PCP who applied silvadene, telfa to area \"It was all he had\"       HPI:  Pt referred to [facility] by PCP for treatment of L ankle wound. Onset: 1987:Pt reports he cut his leg at work, and he has had open wounds in  the area since. 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               Pain: none.      Medications:    Current Outpatient Prescriptions:   •  vitamin D (CHOLECALCIFEROL) 1000 UNIT Tab, Take 2,000 Units by mouth every day., Disp: , Rfl:   •  ascorbic acid (ASCORBIC ACID) 500 MG Tab, Take 500 mg by mouth every day., Disp: , Rfl:   •  KLOR-CON M20 20 MEQ Tab CR, TAKE ONE TABLET BY MOUTH TWICE DAILY, Disp: 180 Tab, Rfl: 3  •  insulin glargine (LANTUS) 100 UNIT/ML Solution, Inject 12-17 Units as instructed 2 times a day. 12 units at night 17 units in the morning., Disp: 1 Vial, Rfl: 0  •  rosuvastatin (CRESTOR) 5 MG Tab, TAKE ONE TABLET BY MOUTH IN THE EVENING, Disp: 90 Tab, Rfl: 3  •  ranitidine (ZANTAC) 150 MG Tab, TAKE ONE TABLET BY MOUTH TWICE DAILY, Disp: 180 Tab, Rfl: 3  •  furosemide (LASIX) 40 MG Tab, TAKE ONE TABLET BY MOUTH ONCE DAILY, Disp: 90 Tab, Rfl: 3  •  benazepril (LOTENSIN) 10 MG Tab, TAKE ONE TABLET BY MOUTH ONCE DAILY, Disp: 90 Tab, Rfl: 3  •  doxazosin (CARDURA) 2 MG Tab, TAKE TWO TABLETS BY MOUTH ONCE DAILY, Disp: 180 Tab, Rfl: 3  •  imatinib (GLEEVEC) 100 MG TABS, Take 300 mg by mouth every day., Disp: , Rfl:            Allergies: Nkda [no known drug allergy]     Objective:      Tests and Measures:   9/28/17: Pt reports his a.m. Blood sugars have been running between 85 and 140.  pt asking about " results 8/4 culture. Reviewed with pt and that ABX were ordered; he confirmed he picked them up and took amox for one week. Stated it looked a little better.  8/22/17 JOSE RAUL performed by Fatimah Green, with my supervision. LT ,  non compressible, ,  160/130 = 1.23  8/4/17 - Wound culture obtained, (+) treated w/amox, pt states was ineffective  Pt reports blood sugars run between 80 and 140.    Orthotic, protective, supportive devices: none today    Fall Risk Assessment : Completed at initial eval 08/04/2017 (+) fall risk              Wound Characteristics                                                    Location: L ankle   Initial Evaluation  Date: 8/4/17 Recertification Date:  8/25/17 80 Day Certification: 09/28/2017   Tissue Type and %: Deep red skin with several open, weeping areas along superior edge. 100% red viable open moist area Red, thickened area with a few weeping spots.   Periwound: Satellite wounds circumferentially to about 3 cm out. Erythema, hemosiderin staining Hemosiderin stainaing, lipodermatosclerosis   Drainage: Serous, moderate. Scant ss Min serous   Exposed structures None None None   Wound Edges:   Indistinct.  irregular Irregular, indistinct   Odor: None None None   S&S of Infection:   Erythema erythema none   Edema: 1+ to LE 1+ LE 2+    Sensation: intact intact Intact               Measurements: L ankle. Measured affected area. Initial Evaluation  Date: 8/4/17 Recertification Date: 8/25/2017  Entire area with scattered openings 80 Day Certification: 09/28/2017   Length (cm) 13 13 8.5   Width (cm) 10 8 8.5   Depth (cm) BELLA 0.1 0   Area (cm2) 130 104 72.25cm2   Tract/undermine None none None        Procedures:     Debridement: nonselective using qtip and zena board   Cleansed with:  Ns/gauze                                                                  Periwound protected with: no sting skin prep crusted with nystatin   Primary dressing: calcium alginate to weeping  "areas, kerlix    Other: Tubi F     Patient Education: Pt reports he attempted to set up appt at Dr. Martinez's office, but when he arrived for the appt, he was not on their schedule.  I advised him of need for vascular f/u and to try again.  He repeated several time \"I just don't like getting messed with.\"  I advised him that if he preferred a different vascular doctor, a referral could certainly be written.  Pt then stated \"No, I do feel comfortable with Dr. Martinez.\"  He is scheduled for L knee meniscus surgery in October and prefers to wait until after this to do his vascular f/u.  Wound stable and pt able to manage at home, so decreased to every 2 weeks AWC appts for now.  May increase after knee surgery, based on pt's needs and preference.  Attempted to explain venous nature/appearance of wound and pt stated \"That's what you people have been saying for 14 years.\"  Reviewed s/sx infection and to contact AWC or go to ER prior to next appt if needed for same.  Pt verbalized understanding and also stated \"I've been a little mixed up the last few days.\"  When I inquired as to why, he stated \"Oh, you know, chasing around after grandkids.\"      Professional Collaboration: certification summary sent to Dr. Martinez via [employer].  Assessment:      Wound etiology: CVI    Wound Progress: Wound area improving, minimal open areas, less drainage.    Rationale for Treatment: Nystatin powder for antifungal, then dry dressing to keep area dry/suppress fungal growth.  Tubi for compression, edema control.    Patient tolerance/compliance: Pt compliant with appts.      Complicating factors: Diabetes, leukemia (and medication), chronic kidney disease, obesity    Need for ongoing Advanced Wound Care services: Needs advanced wound care for ongoing debridement, dressing selection, education.      Plan:      Treatment Plan and Recommendations:  Diagnosis/ICD9: S91.002D    Procedures/CPT: CSWD; visit level 2; nonselective " debridement    Frequency: 1x every two weeks      Treatment Goals: STG 2 Weeks  LTG 4 Weeks   Granulation Tissue: % %   Decrease Necrotic Tissue to: % %   Wound Phase:      Decrease Size by: 10% 20%   Periwound:      Decrease tracts/undermining by: % %   Decrease Pain:          At the time of each visit a thorough assessment of the patient is completed to assure the  appropriateness of our plan of care.  The dressings or modalities may need to be adapted   from the original plan to address any significant changes in the wound environment.          Clinician Signature:_______________________________Date__________________      Physician Signature:______________________________Date:__________________

## 2017-10-11 ENCOUNTER — APPOINTMENT (OUTPATIENT)
Dept: WOUND CARE | Facility: MEDICAL CENTER | Age: 77
End: 2017-10-11
Attending: INTERNAL MEDICINE
Payer: MEDICARE

## 2017-10-11 PROCEDURE — 97602 WOUND(S) CARE NON-SELECTIVE: CPT

## 2017-10-11 NOTE — WOUND TEAM
"Advanced Wound Care  [facility]  [address]  [address]  [place] [address]  [phone] Fax: [phone]    Encounter Note  For Certification Period: 9/25/17 - 12/15/17      Referring Physician: Edgardo Castano DO  Primary Physician:      same  Consulting Physicians: Dr. Hernandez        Wound(s): L medial ankle  Start of Care: 8/4/17       Subjective: seen by PCP who applied silvadene, telfa to area \"It was all he had\"       HPI:  Pt referred to [facility] by PCP for treatment of L ankle wound. Onset: 1987:Pt reports he cut his leg at work, and he has had open wounds in  the area since. 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               Pain: Denies pain today    Medications:    Current Outpatient Prescriptions:   •  vitamin D (CHOLECALCIFEROL) 1000 UNIT Tab, Take 2,000 Units by mouth every day., Disp: , Rfl:   •  ascorbic acid (ASCORBIC ACID) 500 MG Tab, Take 500 mg by mouth every day., Disp: , Rfl:   •  KLOR-CON M20 20 MEQ Tab CR, TAKE ONE TABLET BY MOUTH TWICE DAILY, Disp: 180 Tab, Rfl: 3  •  insulin glargine (LANTUS) 100 UNIT/ML Solution, Inject 12-17 Units as instructed 2 times a day. 12 units at night 17 units in the morning., Disp: 1 Vial, Rfl: 0  •  rosuvastatin (CRESTOR) 5 MG Tab, TAKE ONE TABLET BY MOUTH IN THE EVENING, Disp: 90 Tab, Rfl: 3  •  ranitidine (ZANTAC) 150 MG Tab, TAKE ONE TABLET BY MOUTH TWICE DAILY, Disp: 180 Tab, Rfl: 3  •  furosemide (LASIX) 40 MG Tab, TAKE ONE TABLET BY MOUTH ONCE DAILY, Disp: 90 Tab, Rfl: 3  •  benazepril (LOTENSIN) 10 MG Tab, TAKE ONE TABLET BY MOUTH ONCE DAILY, Disp: 90 Tab, Rfl: 3  •  doxazosin (CARDURA) 2 MG Tab, TAKE TWO TABLETS BY MOUTH ONCE DAILY, Disp: 180 Tab, Rfl: 3  •  imatinib (GLEEVEC) 100 MG TABS, Take 300 mg by mouth every day., Disp: , Rfl:         Allergies: Nkda [no known drug allergy]     Objective:      Tests and Measures:   9/28/17: Pt reports his a.m. Blood sugars have been running between 85 and 140.  pt asking about results " 8/4 culture. Reviewed with pt and that ABX were ordered; he confirmed he picked them up and took amox for one week. Stated it looked a little better.  8/22/17 JOSE RAUL performed by Fatimah Green, with my supervision. LT ,  non compressible, ,  160/130 = 1.23  8/4/17 - Wound culture obtained, (+) treated w/amox, pt states was ineffective  Pt reports blood sugars run between 80 and 140.    Orthotic, protective, supportive devices: none today    Fall Risk Assessment : Completed at initial eval 08/04/2017 (+) fall risk              Wound Characteristics                                                    Location: L ankle   Initial Evaluation  Date: 8/4/17 80 Day Certification: 09/28/2017 Encounter Date:  10/11/17   Tissue Type and %: Deep red skin with several open, weeping areas along superior edge. Red, thickened area with a few weeping spots. No open areas    Periwound: Satellite wounds circumferentially to about 3 cm out. Hemosiderin stainaing, lipodermatosclerosis Hemosiderin staining, lipodermatosclerosis   Drainage: Serous, moderate. Min serous Scant ss   Exposed structures None None None   Wound Edges:   Indistinct.  Irregular, indistinct Closed   Odor: None None None   S&S of Infection:   Erythema None None   Edema: 1+ to LE 2+  1+   Sensation: intact Intact Intact               Measurements: L ankle. Measured affected area. Initial Evaluation  Date: 8/4/17 80 Day Certification: 09/28/2017 Encounter Date:  10/11/17   Length (cm) 13 8.5 Resolved   Width (cm) 10 8.5    Depth (cm) BELLA 0    Area (cm2) 130 72.25cm2    Tract/undermine None None         Procedures:     Debridement: Non selective with NS and gauze   Cleansed with: No rinse foam cleanser and washcloth                                                              Periwound protected with: Sween cream   Primary dressing: Kerlix over sween cream and in case there is any drainage (per patient request)    Other: Patient's own FLORY hose    "  Patient Education: Discussed with patient that there are no open areas to ankle area today and that there was scant drainage. Patient reports that sometimes it does weep despite being closed. Patient reports that he is scheduled for knee surgery on 10/24. Instructed patient to reschedule his next appointment for three weeks for a skin check to ensure that the wound area has remained closed. Patient agrees to plan.     Previous appt: Pt reports he attempted to set up appt at Dr. Martinez's office, but when he arrived for the appt, he was not on their schedule.  I advised him of need for vascular f/u and to try again.  He repeated several time \"I just don't like getting messed with.\"  I advised him that if he preferred a different vascular doctor, a referral could certainly be written.  Pt then stated \"No, I do feel comfortable with Dr. Martinez.\"  He is scheduled for L knee meniscus surgery in October and prefers to wait until after this to do his vascular f/u.  Wound stable and pt able to manage at home, so decreased to every 2 weeks AWC appts for now.  May increase after knee surgery, based on pt's needs and preference.  Attempted to explain venous nature/appearance of wound and pt stated \"That's what you people have been saying for 14 years.\"  Reviewed s/sx infection and to contact AWC or go to ER prior to next appt if needed for same.  Pt verbalized understanding and also stated \"I've been a little mixed up the last few days.\"  When I inquired as to why, he stated \"Oh, you know, chasing around after grandkids.\"      Professional Collaboration: None today    Assessment:      Wound etiology: CVI    Wound Progress: No open areas, scant drainage on previous dressing.     Rationale for Treatment: Sween cream to dry areas, FLORY hose to control edema    Patient tolerance/compliance: Pt compliant with appts.      Complicating factors: Diabetes, leukemia (and medication), chronic kidney disease, obesity    Need for " ongoing Advanced Wound Care services: Needs advanced wound care for ongoing debridement, dressing selection, education.      Plan:      Treatment Plan and Recommendations:  Diagnosis/ICD9: S91.002D    Procedures/CPT: CSWD; visit level 2; nonselective debridement    Frequency: 1x every two weeks      Treatment Goals: STG 2 Weeks  LTG 4 Weeks   Granulation Tissue: % %   Decrease Necrotic Tissue to: % %   Wound Phase:      Decrease Size by: 10% 20%   Periwound:      Decrease tracts/undermining by: % %   Decrease Pain:          At the time of each visit a thorough assessment of the patient is completed to assure the  appropriateness of our plan of care.  The dressings or modalities may need to be adapted   from the original plan to address any significant changes in the wound environment.          Clinician Signature:_______________________________Date__________________      Physician Signature:______________________________Date:__________________

## 2017-10-13 DIAGNOSIS — Z01.810 PRE-OPERATIVE CARDIOVASCULAR EXAMINATION: ICD-10-CM

## 2017-10-13 DIAGNOSIS — Z01.812 PRE-OPERATIVE LABORATORY EXAMINATION: ICD-10-CM

## 2017-10-13 LAB
ANION GAP SERPL CALC-SCNC: 9 MMOL/L (ref 0–11.9)
BUN SERPL-MCNC: 38 MG/DL (ref 8–22)
CALCIUM SERPL-MCNC: 8.8 MG/DL (ref 8.5–10.5)
CHLORIDE SERPL-SCNC: 109 MMOL/L (ref 96–112)
CO2 SERPL-SCNC: 21 MMOL/L (ref 20–33)
CREAT SERPL-MCNC: 1.61 MG/DL (ref 0.5–1.4)
EKG IMPRESSION: NORMAL
ERYTHROCYTE [DISTWIDTH] IN BLOOD BY AUTOMATED COUNT: 49.2 FL (ref 35.9–50)
GFR SERPL CREATININE-BSD FRML MDRD: 42 ML/MIN/1.73 M 2
GLUCOSE SERPL-MCNC: 161 MG/DL (ref 65–99)
HCT VFR BLD AUTO: 38.4 % (ref 42–52)
HGB BLD-MCNC: 12.4 G/DL (ref 14–18)
MCH RBC QN AUTO: 30.8 PG (ref 27–33)
MCHC RBC AUTO-ENTMCNC: 32.3 G/DL (ref 33.7–35.3)
MCV RBC AUTO: 95.3 FL (ref 81.4–97.8)
PLATELET # BLD AUTO: 135 K/UL (ref 164–446)
PMV BLD AUTO: 11.9 FL (ref 9–12.9)
POTASSIUM SERPL-SCNC: 4.4 MMOL/L (ref 3.6–5.5)
RBC # BLD AUTO: 4.03 M/UL (ref 4.7–6.1)
SODIUM SERPL-SCNC: 139 MMOL/L (ref 135–145)
WBC # BLD AUTO: 5.5 K/UL (ref 4.8–10.8)

## 2017-10-13 PROCEDURE — 85027 COMPLETE CBC AUTOMATED: CPT

## 2017-10-13 PROCEDURE — 93005 ELECTROCARDIOGRAM TRACING: CPT

## 2017-10-13 PROCEDURE — 36415 COLL VENOUS BLD VENIPUNCTURE: CPT

## 2017-10-13 PROCEDURE — 93010 ELECTROCARDIOGRAM REPORT: CPT | Performed by: INTERNAL MEDICINE

## 2017-10-13 PROCEDURE — 80048 BASIC METABOLIC PNL TOTAL CA: CPT

## 2017-10-13 RX ORDER — CALCITRIOL 0.25 UG/1
0.25 CAPSULE, LIQUID FILLED ORAL
COMMUNITY

## 2017-10-19 ENCOUNTER — HOSPITAL ENCOUNTER (OUTPATIENT)
Dept: CARDIOLOGY | Facility: MEDICAL CENTER | Age: 77
End: 2017-10-19
Attending: INTERNAL MEDICINE
Payer: MEDICARE

## 2017-10-19 ENCOUNTER — HOSPITAL ENCOUNTER (OUTPATIENT)
Dept: RADIOLOGY | Facility: MEDICAL CENTER | Age: 77
End: 2017-10-19
Attending: INTERNAL MEDICINE
Payer: MEDICARE

## 2017-10-19 DIAGNOSIS — I06.1 RHEUMATIC AORTIC VALVE INSUFFICIENCY: ICD-10-CM

## 2017-10-19 PROCEDURE — 93306 TTE W/DOPPLER COMPLETE: CPT

## 2017-10-19 PROCEDURE — 74175 CTA ABDOMEN W/CONTRAST: CPT

## 2017-10-20 LAB
LV EJECT FRACT MOD 2C 99903: 52.56
LV EJECT FRACT MOD 4C 99902: 59.28
LV EJECT FRACT MOD BP 99901: 57.18

## 2017-10-23 RX ORDER — INSULIN GLARGINE 100 [IU]/ML
INJECTION, SOLUTION SUBCUTANEOUS
Qty: 10 ML | Refills: 11 | Status: SHIPPED | OUTPATIENT
Start: 2017-10-23

## 2017-10-24 ENCOUNTER — HOSPITAL ENCOUNTER (OUTPATIENT)
Facility: MEDICAL CENTER | Age: 77
End: 2017-10-24
Attending: ORTHOPAEDIC SURGERY | Admitting: ORTHOPAEDIC SURGERY
Payer: MEDICARE

## 2017-10-24 VITALS
WEIGHT: 304.24 LBS | HEART RATE: 84 BPM | HEIGHT: 73 IN | OXYGEN SATURATION: 91 % | RESPIRATION RATE: 18 BRPM | BODY MASS INDEX: 40.32 KG/M2 | TEMPERATURE: 97.2 F

## 2017-10-24 PROBLEM — S83.242A ACUTE MEDIAL MENISCUS TEAR OF LEFT KNEE: Status: ACTIVE | Noted: 2017-10-24

## 2017-10-24 LAB
GLUCOSE BLD-MCNC: 123 MG/DL (ref 65–99)
GLUCOSE BLD-MCNC: 133 MG/DL (ref 65–99)

## 2017-10-24 PROCEDURE — 700105 HCHG RX REV CODE 258: Performed by: ORTHOPAEDIC SURGERY

## 2017-10-24 PROCEDURE — 160025 RECOVERY II MINUTES (STATS): Performed by: ORTHOPAEDIC SURGERY

## 2017-10-24 PROCEDURE — 82962 GLUCOSE BLOOD TEST: CPT

## 2017-10-24 PROCEDURE — 502580 HCHG PACK, KNEE ARTHROSCOPY: Performed by: ORTHOPAEDIC SURGERY

## 2017-10-24 PROCEDURE — 160009 HCHG ANES TIME/MIN: Performed by: ORTHOPAEDIC SURGERY

## 2017-10-24 PROCEDURE — 700111 HCHG RX REV CODE 636 W/ 250 OVERRIDE (IP)

## 2017-10-24 PROCEDURE — 160046 HCHG PACU - 1ST 60 MINS PHASE II: Performed by: ORTHOPAEDIC SURGERY

## 2017-10-24 PROCEDURE — 160029 HCHG SURGERY MINUTES - 1ST 30 MINS LEVEL 4: Performed by: ORTHOPAEDIC SURGERY

## 2017-10-24 PROCEDURE — 160036 HCHG PACU - EA ADDL 30 MINS PHASE I: Performed by: ORTHOPAEDIC SURGERY

## 2017-10-24 PROCEDURE — 160048 HCHG OR STATISTICAL LEVEL 1-5: Performed by: ORTHOPAEDIC SURGERY

## 2017-10-24 PROCEDURE — 160035 HCHG PACU - 1ST 60 MINS PHASE I: Performed by: ORTHOPAEDIC SURGERY

## 2017-10-24 PROCEDURE — 700102 HCHG RX REV CODE 250 W/ 637 OVERRIDE(OP)

## 2017-10-24 PROCEDURE — A9270 NON-COVERED ITEM OR SERVICE: HCPCS

## 2017-10-24 PROCEDURE — 160002 HCHG RECOVERY MINUTES (STAT): Performed by: ORTHOPAEDIC SURGERY

## 2017-10-24 PROCEDURE — 160047 HCHG PACU  - EA ADDL 30 MINS PHASE II: Performed by: ORTHOPAEDIC SURGERY

## 2017-10-24 PROCEDURE — 700101 HCHG RX REV CODE 250

## 2017-10-24 PROCEDURE — 501838 HCHG SUTURE GENERAL: Performed by: ORTHOPAEDIC SURGERY

## 2017-10-24 RX ORDER — BUPIVACAINE HYDROCHLORIDE AND EPINEPHRINE 2.5; 5 MG/ML; UG/ML
INJECTION, SOLUTION EPIDURAL; INFILTRATION; INTRACAUDAL; PERINEURAL
Status: DISCONTINUED | OUTPATIENT
Start: 2017-10-24 | End: 2017-10-24 | Stop reason: HOSPADM

## 2017-10-24 RX ORDER — SODIUM CHLORIDE, SODIUM LACTATE, POTASSIUM CHLORIDE, CALCIUM CHLORIDE 600; 310; 30; 20 MG/100ML; MG/100ML; MG/100ML; MG/100ML
1000 INJECTION, SOLUTION INTRAVENOUS
Status: DISCONTINUED | OUTPATIENT
Start: 2017-10-24 | End: 2017-10-24 | Stop reason: HOSPADM

## 2017-10-24 RX ORDER — OXYCODONE HYDROCHLORIDE AND ACETAMINOPHEN 5; 325 MG/1; MG/1
TABLET ORAL
Status: COMPLETED
Start: 2017-10-24 | End: 2017-10-24

## 2017-10-24 RX ORDER — LIDOCAINE HYDROCHLORIDE 10 MG/ML
INJECTION, SOLUTION INFILTRATION; PERINEURAL
Status: COMPLETED
Start: 2017-10-24 | End: 2017-10-24

## 2017-10-24 RX ADMIN — SODIUM CHLORIDE, POTASSIUM CHLORIDE, SODIUM LACTATE AND CALCIUM CHLORIDE 1000 ML: 600; 310; 30; 20 INJECTION, SOLUTION INTRAVENOUS at 06:01

## 2017-10-24 RX ADMIN — LIDOCAINE HYDROCHLORIDE 0.1 ML: 10 INJECTION, SOLUTION INFILTRATION; PERINEURAL at 06:00

## 2017-10-24 RX ADMIN — OXYCODONE HYDROCHLORIDE AND ACETAMINOPHEN 2 TABLET: 5; 325 TABLET ORAL at 07:45

## 2017-10-24 RX ADMIN — FENTANYL CITRATE 50 MCG: 50 INJECTION, SOLUTION INTRAMUSCULAR; INTRAVENOUS at 08:00

## 2017-10-24 RX ADMIN — FENTANYL CITRATE 50 MCG: 50 INJECTION, SOLUTION INTRAMUSCULAR; INTRAVENOUS at 07:45

## 2017-10-24 ASSESSMENT — PAIN SCALES - GENERAL
PAINLEVEL_OUTOF10: 6
PAINLEVEL_OUTOF10: 5
PAINLEVEL_OUTOF10: 5
PAINLEVEL_OUTOF10: 4
PAINLEVEL_OUTOF10: 3
PAINLEVEL_OUTOF10: 5

## 2017-10-24 NOTE — OR NURSING
0925- Pt DC'd home to family via w/c to private vehicle after uneventful stay in stage 2.  L knee dressing cdi.  CMS intact. Pt states pain tolerable/denies nausea. Pt demonstrated use of crutches.

## 2017-10-24 NOTE — DISCHARGE INSTRUCTIONS
ACTIVITY: Rest and take it easy for the first 24 hours.  A responsible adult is recommended to remain with you during that time.  It is normal to feel sleepy.  We encourage you to not do anything that requires balance, judgment or coordination.    MILD FLU-LIKE SYMPTOMS ARE NORMAL. YOU MAY EXPERIENCE GENERALIZED MUSCLE ACHES, THROAT IRRITATION, HEADACHE AND/OR SOME NAUSEA.    FOR 24 HOURS DO NOT:  Drive, operate machinery or run household appliances.  Drink beer or alcoholic beverages.   Make important decisions or sign legal documents.    SPECIAL INSTRUCTIONS: . Ice and elevate extremity.    Weight bearing as tolerated with crutches    DIET: To avoid nausea, slowly advance diet as tolerated, avoiding spicy or greasy foods for the first day.  Add more substantial food to your diet according to your physician's instructions.  Babies can be fed formula or breast milk as soon as they are hungry.  INCREASE FLUIDS AND FIBER TO AVOID CONSTIPATION.    SURGICAL DRESSING/BATHING: May remove dressings Post op Day #2 and Shower with wound uncovered.  Apply bandaids after shower.  Do not soak or submerge incisions for two weeks    FOLLOW-UP APPOINTMENT:  A follow-up appointment should be arranged with your doctor in Follow up 7-10 days  call to schedule.    You should CALL YOUR PHYSICIAN if you develop:  Fever greater than 101 degrees F.  Pain not relieved by medication, or persistent nausea or vomiting.  Excessive bleeding (blood soaking through dressing) or unexpected drainage from the wound.  Extreme redness or swelling around the incision site, drainage of pus or foul smelling drainage.  Inability to urinate or empty your bladder within 8 hours.  Problems with breathing or chest pain.    You should call 911 if you develop problems with breathing or chest pain.  If you are unable to contact your doctor or surgical center, you should go to the nearest emergency room or urgent care center.  Physician's telephone #:  [phone]    If any questions arise, call your doctor.  If your doctor is not available, please feel free to call the Surgical Center at [phone].  The Center is open Monday through Friday from 7AM to 7PM.  You can also call the HEALTH HOTLINE open 24 hours/day, 7 days/week and speak to a nurse at [phone], or toll free at [phone].    A registered nurse may call you a few days after your surgery to see how you are doing after your procedure.    MEDICATIONS: Resume taking daily medication.  Take prescribed pain medication with food.  If no medication is prescribed, you may take non-aspirin pain medication if needed.  PAIN MEDICATION CAN BE VERY CONSTIPATING.  Take a stool softener or laxative such as senokot, pericolace, or milk of magnesia if needed.    Prescription given for  Norco.  Last pain medication given at 0745    If your physician has prescribed pain medication that includes Acetaminophen (Tylenol), do not take additional Acetaminophen (Tylenol) while taking the prescribed medication.    Depression / Suicide Risk    As you are discharged from this [facility] facility, it is important to learn how to keep safe from harming yourself.    Recognize the warning signs:  · Abrupt changes in personality, positive or negative- including increase in energy   · Giving away possessions  · Change in eating patterns- significant weight changes-  positive or negative  · Change in sleeping patterns- unable to sleep or sleeping all the time   · Unwillingness or inability to communicate  · Depression  · Unusual sadness, discouragement and loneliness  · Talk of wanting to die  · Neglect of personal appearance   · Rebelliousness- reckless behavior  · Withdrawal from people/activities they love  · Confusion- inability to concentrate     If you or a loved one observes any of these behaviors or has concerns about self-harm, here's what you can do:  · Talk about it- your feelings and reasons for harming  yourself  · Remove any means that you might use to hurt yourself (examples: pills, rope, extension cords, firearm)  · Get professional help from the community (Mental Health, Substance Abuse, psychological counseling)  · Do not be alone:Call your Safe Contact- someone whom you trust who will be there for you.  · Call your local CRISIS HOTLINE 387-6384 or 850-261-2853  · Call your local Children's Mobile Crisis Response Team [place] (452) 731-0072 or www.[employer]  · Call the toll free National Suicide Prevention Hotlines   · National Suicide Prevention Lifeline 371-071-NVCB (0988)  · National Hope Line Network 800-SUICIDE (675-3651)

## 2017-10-24 NOTE — OP REPORT
DATE OF SERVICE:  10/24/2017    PREOPERATIVE DIAGNOSIS:  Left knee medial meniscal tear.    POSTOPERATIVE DIAGNOSES:  1.  Left knee complex posterior horn medial meniscal tear.  2.  Radial tearing of the lateral meniscus.  3.  Grade III chondromalacia of the patella, the trochlea, the medial femoral   condyle, and the lateral tibial plateau.    PROCEDURES:  1.  Left knee diagnostic arthroscopy with arthroscopic partial medial and   lateral meniscectomy.  2.  Left knee arthroscopic chondroplasty of the patella, trochlea, medial   femoral condyle, lateral tibial plateau.    SURGEON:  Raul Bobo MD    ASSISTANT:  Shruti Heller PA-C    ANESTHESIA:  General.    ANESTHESIOLOGIST:  Shoshana Peoples MD.    IMPLANTS:  None.    COMPLICATIONS:  None.    DISPOSITION:  Stable to postanesthesia care unit.    INDICATIONS:  The patient is a gentleman with progressive left knee pain,   which has been unresponsive to conservative management.  Risks, benefits,   alternatives, and limitations of surgical intervention were discussed in   detail.  He expressed understanding and desired to proceed.    DESCRIPTION OF PROCEDURE:  The patient and the correct operative extremity   were identified in the preoperative area.  The knee was marked.  He was   brought to the operating room where the correct operative extremity again   confirmed.  He was placed supine on the OR table where he underwent general   anesthesia without complication.  Examination under anesthesia showed full   range of motion, no instability.  Knee was prepped with alcohol and injected   with 30 mL of 1% lidocaine with epinephrine.  The knee was then prepped and   draped in the usual sterile fashion using ChloraPrep.  Diagnostic arthroscopy   was then performed, which showed some grade III chondromalacia of the patella   and the trochlea.  Notch showed an intact ACL and PCL.  Medial compartment   showed grade III chondromalacia of the medial femoral condyle.  There was    complex tear of the posterior horn of the medial meniscus with unstable flap   fragment from the root.  Partial medial meniscectomy was performed with a   duckbill resector and the arthroscopic shaver.  Chondroplasty performed of the   medial femoral condyle.  The lateral compartment showed radial tearing of the   free edge of the lateral meniscus.  Partial lateral meniscectomy was   performed with the arthroscopic shaver.  There was some grade II/III   chondromalacia of the lateral tibial plateau.  Chondroplasty was performed of   the lateral tibial plateau.  The gutters were checked for loose bodies, none   were identified.  Spinal needle was placed into the suprapatellar pouch under   direct vision.  The fluid removed from the knee.  The scope was withdrawn.    The portals closed with 3-0 nylon.  The knee injected with 0.5% bupivacaine   with epinephrine.  Sterile dressings were applied.  The knee was loosely   overwrapped with an Ace wrap.  The patient was then allowed to awake from   anesthesia, transferred to his hospital cart, and taken to postanesthesia care   unit in stable condition.  He tolerated the procedure well.  There were no   immediate complications.       ____________________________________     ANA NEVAREZ MD RD / ADEEL    DD:  10/24/2017 07:29:36  DT:  10/24/2017 07:42:53    D#:  [id number]  Job#:  [id number]

## 2017-10-24 NOTE — OR NURSING
0730 received from or  lma dc'd  resp spont left knee dressing c/d/i  Dp2+  Foot warm  Good movement  Elevated  Ice pack applied  0830 pain tolerable  Dressing remains c/d/i  Meets discharge criteria

## 2017-10-26 ENCOUNTER — APPOINTMENT (OUTPATIENT)
Dept: WOUND CARE | Facility: MEDICAL CENTER | Age: 77
End: 2017-10-26
Attending: INTERNAL MEDICINE
Payer: MEDICARE

## 2017-10-26 ENCOUNTER — PATIENT OUTREACH (OUTPATIENT)
Dept: HEALTH INFORMATION MANAGEMENT | Facility: OTHER | Age: 77
End: 2017-10-26

## 2017-10-26 NOTE — PROGRESS NOTES
Outcome: Left Message    Please transfer to Patient Outreach Team at [phone] when patient returns call.    WebIZ Checked & Epic Updated:  yes    [facility]Connect Verified: yes    Attempt # 1

## 2017-11-02 ENCOUNTER — NON-PROVIDER VISIT (OUTPATIENT)
Dept: WOUND CARE | Facility: MEDICAL CENTER | Age: 77
End: 2017-11-02
Attending: INTERNAL MEDICINE
Payer: MEDICARE

## 2017-11-02 PROCEDURE — 99212 OFFICE O/P EST SF 10 MIN: CPT

## 2017-11-02 NOTE — CERTIFICATION
Advanced Wound Care   [facility]   [address]   [address]   [place] [address]   [phone] Fax: [phone]    Discharge Note      Referring Physician: Edgardo Castano MD  Wound Etiology: CVI  Wound location: Left medial ankle  ICD-10: S91.002D  Date of Discharge: 11/2/17    Assessment:  Discharge patient at this time secondary to wound resolution. Pt instr dc today, keep area clean, it will be fragile for a few days, bathe and dry area gently, only ever regains a maximum of 80% of the tensile strength of the surrounding skin, remodeling of scar can continue for 6mo - a year. Contact PCP for a referral back her if any problems with area opening and draining again. Pt verbalizes understanding.     Thank you for the referral and the opportunity to treat your patient.      Clinician Signature: _____________________________ Date:_______________

## 2017-11-07 ENCOUNTER — TELEPHONE (OUTPATIENT)
Dept: MEDICAL GROUP | Facility: CLINIC | Age: 77
End: 2017-11-07

## 2017-11-07 DIAGNOSIS — I10 BENIGN ESSENTIAL HTN: ICD-10-CM

## 2017-11-07 DIAGNOSIS — I10 HYPERTENSION: ICD-10-CM

## 2017-11-07 RX ORDER — BENAZEPRIL HYDROCHLORIDE 10 MG/1
TABLET ORAL
Qty: 90 TAB | Refills: 3 | Status: SHIPPED | OUTPATIENT
Start: 2017-11-07

## 2017-11-07 RX ORDER — DOXAZOSIN 2 MG/1
TABLET ORAL
Qty: 180 TAB | Refills: 3 | Status: SHIPPED | OUTPATIENT
Start: 2017-11-07 | End: 2018-05-22

## 2017-11-08 NOTE — TELEPHONE ENCOUNTER
VOICEMAIL  1. Caller Name: Sukumar Mode Page                        Call Back Number: [phone] (home)     2. Message: Patient called to ask about next scheduled appointment.     3. Patient approves office to leave a detailed voicemail/MyChart message: N\A

## 2017-11-15 ENCOUNTER — OFFICE VISIT (OUTPATIENT)
Dept: MEDICAL GROUP | Facility: CLINIC | Age: 77
End: 2017-11-15
Payer: MEDICARE

## 2017-11-15 VITALS
OXYGEN SATURATION: 93 % | HEIGHT: 73 IN | DIASTOLIC BLOOD PRESSURE: 60 MMHG | WEIGHT: 298.7 LBS | BODY MASS INDEX: 39.59 KG/M2 | HEART RATE: 70 BPM | RESPIRATION RATE: 18 BRPM | SYSTOLIC BLOOD PRESSURE: 125 MMHG | TEMPERATURE: 97.5 F

## 2017-11-15 DIAGNOSIS — L72.9 CYST OF SKIN: ICD-10-CM

## 2017-11-15 PROCEDURE — 99213 OFFICE O/P EST LOW 20 MIN: CPT | Performed by: INTERNAL MEDICINE

## 2017-11-15 NOTE — PROGRESS NOTES
CC: Sukumar Quiñonez is a 77 y.o. male is suffering from   Chief Complaint   Patient presents with   • Follow-Up         SUBJECTIVE:  1. Cyst of skin  Sainz here for follow-up, suffering from a skin cyst at his left frontal area, states that his knee pain is significant improved after surgery is feeling really quite good        Past social, family, history:   Social History   Substance Use Topics   • Smoking status: Never Smoker   • Smokeless tobacco: Never Used   • Alcohol use No         MEDICATIONS:    Current Outpatient Prescriptions:   •  doxazosin (CARDURA) 2 MG Tab, TAKE TWO TABLETS BY MOUTH ONCE DAILY, Disp: 180 Tab, Rfl: 3  •  benazepril (LOTENSIN) 10 MG Tab, TAKE ONE TABLET BY MOUTH ONCE DAILY, Disp: 90 Tab, Rfl: 3  •  LANTUS 100 UNIT/ML Solution, INJECT 10 UNITS SUBCUTANEOUSLY ONCE DAILY AT BEDTIME AS DIRECTED, Disp: 10 mL, Rfl: 11  •  calcitRIOL (ROCALTROL) 0.25 MCG Cap, Take 0.25 mcg by mouth every 48 hours., Disp: , Rfl:   •  Kristie, Zingiber officinalis, (KRISTIE PO), Take  by mouth., Disp: , Rfl:   •  Capsicum, Cayenne, (CAYENNE PEPPER PO), Take  by mouth every day., Disp: , Rfl:   •  vitamin D (CHOLECALCIFEROL) 1000 UNIT Tab, Take 2,000 Units by mouth every day., Disp: , Rfl:   •  ascorbic acid (ASCORBIC ACID) 500 MG Tab, Take 500 mg by mouth every day., Disp: , Rfl:   •  KLOR-CON M20 20 MEQ Tab CR, TAKE ONE TABLET BY MOUTH TWICE DAILY, Disp: 180 Tab, Rfl: 3  •  rosuvastatin (CRESTOR) 5 MG Tab, TAKE ONE TABLET BY MOUTH IN THE EVENING, Disp: 90 Tab, Rfl: 3  •  ranitidine (ZANTAC) 150 MG Tab, TAKE ONE TABLET BY MOUTH TWICE DAILY, Disp: 180 Tab, Rfl: 3  •  furosemide (LASIX) 40 MG Tab, TAKE ONE TABLET BY MOUTH ONCE DAILY, Disp: 90 Tab, Rfl: 3  •  imatinib (GLEEVEC) 100 MG TABS, Take 300 mg by mouth every day., Disp: , Rfl:     PROBLEMS:  Patient Active Problem List    Diagnosis Date Noted   • Acute medial meniscus tear of left knee 10/24/2017   • Wound of left lower extremity 08/15/2017   •  "Wound infection 08/07/2017   • Hypoglycemia 05/26/2017   • Elevated TSH 05/26/2017   • Type 2 diabetes mellitus (CMS-HCC) 05/26/2017   • Morbid obesity with BMI of 40.0-44.9, adult ([facility]) 05/08/2017   • Type II diabetes mellitus, well controlled (CMS-HCC) 01/18/2017   • HTN (hypertension) 01/27/2012   • Dyslipidemia 01/27/2012   • Aortic valve insufficiency 08/25/2011   • CML (chronic myelocytic leukemia) (CMS-HCC) 08/25/2011       REVIEW OF SYSTEMS:  Gen.:  No Nausea, Vomiting, fever, Chills.  Heart: No chest pain.  Lungs:  No shortness of Breath.  Psychological: Carlos unusual Anxiety depression     PHYSICAL EXAM   Constitutional: Alert, cooperative, not in acute distress.  Cardiovascular:  Rate Rhythm is regular without murmurs rubs clicks.     Thorax & Lungs: Clear to auscultation, no wheezing, rhonchi, or rales  HENT: Normocephalic, Atraumatic.  Eyes: PERRLA, EOMI, Conjunctiva normal.   Neck: Trachia is midline no swelling of the thyroid.   Lymphatic: No lymphadenopathy noted.   Neurologic: Alert & oriented x 3, cranial nerves II through XII are intact, Normal motor function, Normal sensory function, No focal deficits noted.   Psychiatric: Affect normal, Judgment normal, Mood normal.     VITAL SIGNS:/60   Pulse 70   Temp 36.4 °C (97.5 °F)   Resp 18   Ht 1.854 m (6' 1\")   Wt (!) 135.5 kg (298 lb 11.2 oz)   SpO2 93%   BMI 39.41 kg/m²     Labs: Reviewed    Assessment:                                                     Plan:    1. Cyst of skin  Skin cyst referral written to Dr. Ruth.   - REFERRAL TO GENERAL SURGERY        "

## 2017-12-14 ENCOUNTER — HOSPITAL ENCOUNTER (OUTPATIENT)
Dept: LAB | Facility: MEDICAL CENTER | Age: 77
End: 2017-12-14
Attending: UROLOGY
Payer: MEDICARE

## 2017-12-14 LAB — PSA SERPL-MCNC: 1.03 NG/ML (ref 0–4)

## 2017-12-14 PROCEDURE — 84153 ASSAY OF PSA TOTAL: CPT

## 2017-12-14 PROCEDURE — 36415 COLL VENOUS BLD VENIPUNCTURE: CPT

## 2017-12-18 ENCOUNTER — HOSPITAL ENCOUNTER (OUTPATIENT)
Facility: MEDICAL CENTER | Age: 77
End: 2017-12-18
Attending: SURGERY
Payer: MEDICARE

## 2017-12-18 PROCEDURE — 88304 TISSUE EXAM BY PATHOLOGIST: CPT

## 2018-01-25 RX ORDER — FUROSEMIDE 40 MG/1
TABLET ORAL
Qty: 90 TAB | Refills: 3 | Status: SHIPPED | OUTPATIENT
Start: 2018-01-25

## 2018-01-31 ENCOUNTER — PATIENT OUTREACH (OUTPATIENT)
Dept: HEALTH INFORMATION MANAGEMENT | Facility: OTHER | Age: 78
End: 2018-01-31

## 2018-01-31 NOTE — PROGRESS NOTES
1. Attempt #:Final    2. HealthConnect Verified: yes     3. Verify PCP: yes    4. Care Team Updated:       •   DME Company (gait device, O2, CPAP, etc.): NO       •   Other Specialists (eye doctor, derm, GYN, cardiology, endo, etc): NO    5.  Reviewed/Updated the following with patient:       •   Communication Preference Obtained? YES       •   Preferred Pharmacy? YES       •   Preferred Lab? YES       •   Family History (document living status of immediate family members and if + hx of cancer, diabetes, hypertension, hyperlipidemia, heart attack, stroke) YES. Was Abstract Encounter opened and chart updated? YES    6. [employer] Activation: declined    7. [employer] Rickie: no    8. Annual Wellness Visit Scheduling  Scheduling Status:Scheduled      9. Care Gap Scheduling (Attempt to Schedule EACH Overdue Care Gap!)     Health Maintenance Due   Topic Date Due   • Annual Wellness Visit  1940   • IMM ZOSTER VACCINE  10/30/1999   • DIABETES MONOFILAMENT / LE EXAM  10/11/2017// Will discus with PCP. Pt said that he lives to far to come for different appt's.        Scheduled patient for Annual Wellness Visit    10. Patient was advised: “This is a free wellness visit. The provider will screen for medical conditions to help you stay healthy. If you have other concerns to address you may be asked to discuss these at a separate visit or there may be an additional fee.”     11. Patient was informed to arrive 15 min prior to their scheduled appointment and bring in their medication bottles.

## 2018-02-20 ENCOUNTER — HOSPITAL ENCOUNTER (OUTPATIENT)
Dept: LAB | Facility: MEDICAL CENTER | Age: 78
End: 2018-02-20
Attending: INTERNAL MEDICINE
Payer: MEDICARE

## 2018-02-20 LAB
25(OH)D3 SERPL-MCNC: 28 NG/ML (ref 30–100)
ALBUMIN SERPL BCP-MCNC: 4.3 G/DL (ref 3.2–4.9)
ALBUMIN/GLOB SERPL: 1.7 G/DL
ALP SERPL-CCNC: 50 U/L (ref 30–99)
ALT SERPL-CCNC: 13 U/L (ref 2–50)
ANION GAP SERPL CALC-SCNC: 5 MMOL/L (ref 0–11.9)
APPEARANCE UR: CLEAR
AST SERPL-CCNC: 18 U/L (ref 12–45)
BASOPHILS # BLD AUTO: 0.5 % (ref 0–1.8)
BASOPHILS # BLD: 0.03 K/UL (ref 0–0.12)
BILIRUB SERPL-MCNC: 0.7 MG/DL (ref 0.1–1.5)
BILIRUB UR QL STRIP.AUTO: NEGATIVE
BUN SERPL-MCNC: 42 MG/DL (ref 8–22)
CALCIUM SERPL-MCNC: 9.1 MG/DL (ref 8.5–10.5)
CHLORIDE SERPL-SCNC: 105 MMOL/L (ref 96–112)
CO2 SERPL-SCNC: 27 MMOL/L (ref 20–33)
COLOR UR: YELLOW
CREAT SERPL-MCNC: 1.95 MG/DL (ref 0.5–1.4)
CREAT UR-MCNC: 38.2 MG/DL
EOSINOPHIL # BLD AUTO: 0.35 K/UL (ref 0–0.51)
EOSINOPHIL NFR BLD: 6 % (ref 0–6.9)
ERYTHROCYTE [DISTWIDTH] IN BLOOD BY AUTOMATED COUNT: 47.4 FL (ref 35.9–50)
FERRITIN SERPL-MCNC: 174 NG/ML (ref 22–322)
GLOBULIN SER CALC-MCNC: 2.5 G/DL (ref 1.9–3.5)
GLUCOSE SERPL-MCNC: 206 MG/DL (ref 65–99)
GLUCOSE UR STRIP.AUTO-MCNC: NEGATIVE MG/DL
HCT VFR BLD AUTO: 38.6 % (ref 42–52)
HGB BLD-MCNC: 12.8 G/DL (ref 14–18)
IMM GRANULOCYTES # BLD AUTO: 0.02 K/UL (ref 0–0.11)
IMM GRANULOCYTES NFR BLD AUTO: 0.3 % (ref 0–0.9)
IRON SATN MFR SERPL: 27 % (ref 15–55)
IRON SERPL-MCNC: 90 UG/DL (ref 50–180)
KETONES UR STRIP.AUTO-MCNC: NEGATIVE MG/DL
LEUKOCYTE ESTERASE UR QL STRIP.AUTO: NEGATIVE
LYMPHOCYTES # BLD AUTO: 1.05 K/UL (ref 1–4.8)
LYMPHOCYTES NFR BLD: 17.9 % (ref 22–41)
MCH RBC QN AUTO: 31.6 PG (ref 27–33)
MCHC RBC AUTO-ENTMCNC: 33.2 G/DL (ref 33.7–35.3)
MCV RBC AUTO: 95.3 FL (ref 81.4–97.8)
MICRO URNS: NORMAL
MONOCYTES # BLD AUTO: 0.5 K/UL (ref 0–0.85)
MONOCYTES NFR BLD AUTO: 8.5 % (ref 0–13.4)
NEUTROPHILS # BLD AUTO: 3.91 K/UL (ref 1.82–7.42)
NEUTROPHILS NFR BLD: 66.8 % (ref 44–72)
NITRITE UR QL STRIP.AUTO: NEGATIVE
NRBC # BLD AUTO: 0 K/UL
NRBC BLD-RTO: 0 /100 WBC
PH UR STRIP.AUTO: 6 [PH]
PHOSPHATE SERPL-MCNC: 3.5 MG/DL (ref 2.5–4.5)
PLATELET # BLD AUTO: 124 K/UL (ref 164–446)
PMV BLD AUTO: 11.4 FL (ref 9–12.9)
POTASSIUM SERPL-SCNC: 4.2 MMOL/L (ref 3.6–5.5)
PROT SERPL-MCNC: 6.8 G/DL (ref 6–8.2)
PROT UR QL STRIP: NEGATIVE MG/DL
PROT UR-MCNC: <4 MG/DL (ref 0–15)
PROT/CREAT UR: NORMAL MG/G (ref 15–68)
PTH-INTACT SERPL-MCNC: 61.3 PG/ML (ref 14–72)
RBC # BLD AUTO: 4.05 M/UL (ref 4.7–6.1)
RBC UR QL AUTO: NEGATIVE
SODIUM SERPL-SCNC: 137 MMOL/L (ref 135–145)
SP GR UR STRIP.AUTO: 1.01
TIBC SERPL-MCNC: 335 UG/DL (ref 250–450)
URATE SERPL-MCNC: 9.4 MG/DL (ref 2.5–8.3)
UROBILINOGEN UR STRIP.AUTO-MCNC: 0.2 MG/DL
WBC # BLD AUTO: 5.9 K/UL (ref 4.8–10.8)

## 2018-02-20 PROCEDURE — 82306 VITAMIN D 25 HYDROXY: CPT

## 2018-02-20 PROCEDURE — 80053 COMPREHEN METABOLIC PANEL: CPT

## 2018-02-20 PROCEDURE — 84550 ASSAY OF BLOOD/URIC ACID: CPT

## 2018-02-20 PROCEDURE — 85025 COMPLETE CBC W/AUTO DIFF WBC: CPT

## 2018-02-20 PROCEDURE — 36415 COLL VENOUS BLD VENIPUNCTURE: CPT

## 2018-02-20 PROCEDURE — 83550 IRON BINDING TEST: CPT

## 2018-02-20 PROCEDURE — 82570 ASSAY OF URINE CREATININE: CPT

## 2018-02-20 PROCEDURE — 83970 ASSAY OF PARATHORMONE: CPT

## 2018-02-20 PROCEDURE — 83540 ASSAY OF IRON: CPT

## 2018-02-20 PROCEDURE — 82728 ASSAY OF FERRITIN: CPT

## 2018-02-20 PROCEDURE — 81003 URINALYSIS AUTO W/O SCOPE: CPT

## 2018-02-20 PROCEDURE — 84100 ASSAY OF PHOSPHORUS: CPT

## 2018-02-20 PROCEDURE — 84156 ASSAY OF PROTEIN URINE: CPT

## 2018-02-26 ENCOUNTER — TELEPHONE (OUTPATIENT)
Dept: HEALTH INFORMATION MANAGEMENT | Facility: OTHER | Age: 78
End: 2018-02-26

## 2018-03-06 ENCOUNTER — TELEPHONE (OUTPATIENT)
Dept: MEDICAL GROUP | Facility: CLINIC | Age: 78
End: 2018-03-06

## 2018-03-06 NOTE — TELEPHONE ENCOUNTER
ESTABLISHED PATIENT PRE-VISIT PLANNING     Note: Patient will not be contacted if there is no indication to call.     1.  Reviewed notes from the last few office visits within the medical group: Yes    2.  If any orders were placed at last visit or intended to be done for this visit (i.e. 6 mos follow-up), do we have Results/Consult Notes?        •  Labs - Labs ordered, completed on 02-28-18 and results are in chart.   Note: If patient appointment is for lab review and patient did not complete labs, check with provider if OK to reschedule patient until labs completed.       •  Imaging - Imaging ordered, NOT completed. Patient advised to complete prior to next appointment.       •  Referrals - No referrals were ordered at last office visit.    3. Is this appointment scheduled as a Hospital Follow-Up? No    4.  Immunizations were updated in [employer] using Web[employer]?: Yes       •  Web [employer] Recommendations: ZOSTAVAX (Shingles)    5.  Patient is due for the following Health Maintenance Topics:   Health Maintenance Due   Topic Date Due   • Annual Wellness Visit  1940   • IMM ZOSTER VACCINE  10/30/1999   • DIABETES MONOFILAMENT / LE EXAM  10/11/2017   • A1C SCREENING  03/01/2018       - Patient is up-to-date on all Health Maintenance topics. No records have been requested at this time.    6.  Patient was NOT informed to arrive 15 min prior to their scheduled appointment and bring in their medication bottles.

## 2018-03-07 ENCOUNTER — OFFICE VISIT (OUTPATIENT)
Dept: MEDICAL GROUP | Facility: CLINIC | Age: 78
End: 2018-03-07
Payer: MEDICARE

## 2018-03-07 VITALS
TEMPERATURE: 97.9 F | WEIGHT: 303 LBS | RESPIRATION RATE: 16 BRPM | HEART RATE: 61 BPM | HEIGHT: 73 IN | OXYGEN SATURATION: 97 % | BODY MASS INDEX: 40.16 KG/M2 | DIASTOLIC BLOOD PRESSURE: 65 MMHG | SYSTOLIC BLOOD PRESSURE: 110 MMHG

## 2018-03-07 DIAGNOSIS — E11.22 TYPE 2 DIABETES MELLITUS WITH DIABETIC CHRONIC KIDNEY DISEASE, UNSPECIFIED CKD STAGE, UNSPECIFIED LONG TERM INSULIN USE STATUS: Chronic | ICD-10-CM

## 2018-03-07 DIAGNOSIS — E66.01 MORBID OBESITY WITH BMI OF 40.0-44.9, ADULT (HCC): ICD-10-CM

## 2018-03-07 DIAGNOSIS — C92.10 CML (CHRONIC MYELOCYTIC LEUKEMIA) (HCC): ICD-10-CM

## 2018-03-07 DIAGNOSIS — Z85.46 HISTORY OF PROSTATE CANCER: ICD-10-CM

## 2018-03-07 PROCEDURE — 99214 OFFICE O/P EST MOD 30 MIN: CPT | Performed by: INTERNAL MEDICINE

## 2018-03-07 ASSESSMENT — PATIENT HEALTH QUESTIONNAIRE - PHQ9: CLINICAL INTERPRETATION OF PHQ2 SCORE: 0

## 2018-03-07 NOTE — PROGRESS NOTES
CC: Sukumar Quiñonez is a 77 y.o. male is suffering from   Chief Complaint   Patient presents with   • Follow-Up     4 months         SUBJECTIVE:  1. Morbid obesity with BMI of 40.0-44.9, adult ([facility])  Ez is here for follow-up has a history of morbid obesity discussed the importance of diet and exercise.     2. Type 2 diabetes mellitus with diabetic chronic kidney disease, unspecified CKD stage, unspecified long term insulin use status (CMS-HCC)  Patient has a history of type 2 diabetes is suffering from chronic kidney disease is clinically stable and is being followed by nephrology    3. CML (chronic myelocytic leukemia) (CMS-[facility])  Patient with a history of CML which is also stable    4. History of prostate cancer  Patient has undergone evaluation by Dr. cruz also by Dr. Sifuentes regarding his recurrent prostate cancer at this juncture no action taken        Past social, family, history: , daughter accompanies him today  Social History   Substance Use Topics   • Smoking status: Never Smoker   • Smokeless tobacco: Never Used   • Alcohol use No         MEDICATIONS:    Current Outpatient Prescriptions:   •  furosemide (LASIX) 40 MG Tab, TAKE ONE TABLET BY MOUTH ONCE DAILY, Disp: 90 Tab, Rfl: 3  •  doxazosin (CARDURA) 2 MG Tab, TAKE TWO TABLETS BY MOUTH ONCE DAILY, Disp: 180 Tab, Rfl: 3  •  benazepril (LOTENSIN) 10 MG Tab, TAKE ONE TABLET BY MOUTH ONCE DAILY, Disp: 90 Tab, Rfl: 3  •  LANTUS 100 UNIT/ML Solution, INJECT 10 UNITS SUBCUTANEOUSLY ONCE DAILY AT BEDTIME AS DIRECTED, Disp: 10 mL, Rfl: 11  •  calcitRIOL (ROCALTROL) 0.25 MCG Cap, Take 0.25 mcg by mouth every 48 hours., Disp: , Rfl:   •  Kristie, Zingiber officinalis, (KRISTIE PO), Take  by mouth., Disp: , Rfl:   •  Capsicum, Cayenne, (CAYENNE PEPPER PO), Take  by mouth every day., Disp: , Rfl:   •  vitamin D (CHOLECALCIFEROL) 1000 UNIT Tab, Take 2,000 Units by mouth every day., Disp: , Rfl:   •  ascorbic acid (ASCORBIC ACID) 500 MG Tab, Take 500 mg by mouth  every day., Disp: , Rfl:   •  KLOR-CON M20 20 MEQ Tab CR, TAKE ONE TABLET BY MOUTH TWICE DAILY, Disp: 180 Tab, Rfl: 3  •  rosuvastatin (CRESTOR) 5 MG Tab, TAKE ONE TABLET BY MOUTH IN THE EVENING, Disp: 90 Tab, Rfl: 3  •  ranitidine (ZANTAC) 150 MG Tab, TAKE ONE TABLET BY MOUTH TWICE DAILY, Disp: 180 Tab, Rfl: 3  •  imatinib (GLEEVEC) 100 MG TABS, Take 300 mg by mouth every day., Disp: , Rfl:     PROBLEMS:  Patient Active Problem List    Diagnosis Date Noted   • Acute medial meniscus tear of left knee 10/24/2017   • Wound of left lower extremity 08/15/2017   • Wound infection 08/07/2017   • Hypoglycemia 05/26/2017   • Elevated TSH 05/26/2017   • Type 2 diabetes mellitus (CMS-HCC) 05/26/2017   • Morbid obesity with BMI of 40.0-44.9, adult ([facility]) 05/08/2017   • Type II diabetes mellitus, well controlled (CMS-HCC) 01/18/2017   • HTN (hypertension) 01/27/2012   • Dyslipidemia 01/27/2012   • Aortic valve insufficiency 08/25/2011   • CML (chronic myelocytic leukemia) (CMS-HCC) 08/25/2011       REVIEW OF SYSTEMS:  Gen.:  No Nausea, Vomiting, fever, Chills.  Heart: No chest pain.  Lungs:  No shortness of Breath.  Psychological: Carlos unusual Anxiety depression     PHYSICAL EXAM   Constitutional: Alert, cooperative, not in acute distress.  Cardiovascular:  Rate Rhythm is regular without murmurs rubs clicks.     Thorax & Lungs: Clear to auscultation, no wheezing, rhonchi, or rales  HENT: Normocephalic, Atraumatic.  Eyes: PERRLA, EOMI, Conjunctiva normal.   Neck: Trachia is midline no swelling of the thyroid.   Lymphatic: No lymphadenopathy noted.   Musculoskeletal: No evidence of diabetic ulcerations or neuropathy to monofilament wire testing  Neurologic: Alert & oriented x 3, cranial nerves II through XII are intact, Normal motor function, Normal sensory function, No focal deficits noted.   Psychiatric: Affect normal, Judgment normal, Mood normal.     VITAL SIGNS:/65   Pulse 61   Temp 36.6 °C (97.9 °F)   Resp 16   Ht  "1.854 m (6' 1\")   Wt (!) 137.4 kg (303 lb)   SpO2 97%   BMI 39.98 kg/m²     Labs: Reviewed    Assessment:                                                     Plan:    1. Morbid obesity with BMI of 40.0-44.9, adult ([facility])  Discussed weight loss with the patient  - Patient identified as having weight management issue.  Appropriate orders and counseling given.    2. Type 2 diabetes mellitus with diabetic chronic kidney disease, unspecified CKD stage, unspecified long term insulin use status (CMS-HCC)  Labs ordered  - Diabetic Monofilament Lower Extremity Exam  - HEMOGLOBIN A1C; Future  - REFERRAL TO OPHTHALMOLOGY    3. CML (chronic myelocytic leukemia) (CMS-HCC)  Clinically stable    4. History of prostate cancer  Followed by Dr. cruz and Dr. Sifuentes        "

## 2018-04-26 ENCOUNTER — HOSPITAL ENCOUNTER (OUTPATIENT)
Dept: RADIOLOGY | Facility: MEDICAL CENTER | Age: 78
End: 2018-04-26
Attending: UROLOGY
Payer: MEDICARE

## 2018-04-26 DIAGNOSIS — C61 MALIGNANT NEOPLASM OF PROSTATE (HCC): ICD-10-CM

## 2018-04-26 PROCEDURE — A9503 TC99M MEDRONATE: HCPCS

## 2018-05-22 DIAGNOSIS — Z01.810 PRE-OPERATIVE CARDIOVASCULAR EXAMINATION: ICD-10-CM

## 2018-05-22 DIAGNOSIS — Z01.812 PRE-OPERATIVE LABORATORY EXAMINATION: ICD-10-CM

## 2018-05-22 LAB
ANION GAP SERPL CALC-SCNC: 9 MMOL/L (ref 0–11.9)
BUN SERPL-MCNC: 34 MG/DL (ref 8–22)
CALCIUM SERPL-MCNC: 9 MG/DL (ref 8.5–10.5)
CHLORIDE SERPL-SCNC: 107 MMOL/L (ref 96–112)
CO2 SERPL-SCNC: 25 MMOL/L (ref 20–33)
CREAT SERPL-MCNC: 2.01 MG/DL (ref 0.5–1.4)
EKG IMPRESSION: NORMAL
ERYTHROCYTE [DISTWIDTH] IN BLOOD BY AUTOMATED COUNT: 49.2 FL (ref 35.9–50)
GLUCOSE SERPL-MCNC: 144 MG/DL (ref 65–99)
HCT VFR BLD AUTO: 37 % (ref 42–52)
HGB BLD-MCNC: 12.2 G/DL (ref 14–18)
MCH RBC QN AUTO: 31.4 PG (ref 27–33)
MCHC RBC AUTO-ENTMCNC: 33 G/DL (ref 33.7–35.3)
MCV RBC AUTO: 95.1 FL (ref 81.4–97.8)
PLATELET # BLD AUTO: 129 K/UL (ref 164–446)
PMV BLD AUTO: 11.8 FL (ref 9–12.9)
POTASSIUM SERPL-SCNC: 4.5 MMOL/L (ref 3.6–5.5)
RBC # BLD AUTO: 3.89 M/UL (ref 4.7–6.1)
SODIUM SERPL-SCNC: 141 MMOL/L (ref 135–145)
WBC # BLD AUTO: 5.5 K/UL (ref 4.8–10.8)

## 2018-05-22 PROCEDURE — 80048 BASIC METABOLIC PNL TOTAL CA: CPT

## 2018-05-22 PROCEDURE — 36415 COLL VENOUS BLD VENIPUNCTURE: CPT

## 2018-05-22 PROCEDURE — 85027 COMPLETE CBC AUTOMATED: CPT

## 2018-05-22 PROCEDURE — 93005 ELECTROCARDIOGRAM TRACING: CPT

## 2018-05-22 PROCEDURE — 93010 ELECTROCARDIOGRAM REPORT: CPT | Performed by: INTERNAL MEDICINE

## 2018-05-22 RX ORDER — DOXAZOSIN 2 MG/1
2 TABLET ORAL DAILY
COMMUNITY

## 2018-05-22 NOTE — OR NURSING
Dr Li notified of BMI > 40.Pt advised to speak with Dr that prescribes insulin to find out if dosage needs to be decreased pre op r/t fasting. Pt states blood sugar drops in the AM occasionally.

## 2018-06-01 ENCOUNTER — TELEPHONE (OUTPATIENT)
Dept: MEDICAL GROUP | Facility: CLINIC | Age: 78
End: 2018-06-01

## 2018-06-06 ENCOUNTER — OFFICE VISIT (OUTPATIENT)
Dept: MEDICAL GROUP | Facility: CLINIC | Age: 78
End: 2018-06-06
Payer: MEDICARE

## 2018-06-06 VITALS
TEMPERATURE: 97.3 F | WEIGHT: 305 LBS | RESPIRATION RATE: 18 BRPM | HEIGHT: 73 IN | OXYGEN SATURATION: 94 % | SYSTOLIC BLOOD PRESSURE: 122 MMHG | HEART RATE: 76 BPM | DIASTOLIC BLOOD PRESSURE: 62 MMHG | BODY MASS INDEX: 40.42 KG/M2

## 2018-06-06 DIAGNOSIS — E78.5 DYSLIPIDEMIA: Chronic | ICD-10-CM

## 2018-06-06 DIAGNOSIS — E11.22 TYPE 2 DIABETES MELLITUS WITH DIABETIC CHRONIC KIDNEY DISEASE, UNSPECIFIED CKD STAGE, UNSPECIFIED WHETHER LONG TERM INSULIN USE (HCC): Chronic | ICD-10-CM

## 2018-06-06 DIAGNOSIS — C92.10 CML (CHRONIC MYELOCYTIC LEUKEMIA) (HCC): Chronic | ICD-10-CM

## 2018-06-06 DIAGNOSIS — E66.01 MORBID OBESITY WITH BMI OF 40.0-44.9, ADULT (HCC): ICD-10-CM

## 2018-06-06 PROCEDURE — G0439 PPPS, SUBSEQ VISIT: HCPCS | Performed by: INTERNAL MEDICINE

## 2018-06-06 PROCEDURE — 92250 FUNDUS PHOTOGRAPHY W/I&R: CPT | Mod: TC | Performed by: INTERNAL MEDICINE

## 2018-06-06 ASSESSMENT — ACTIVITIES OF DAILY LIVING (ADL): BATHING_REQUIRES_ASSISTANCE: 0

## 2018-06-06 ASSESSMENT — ENCOUNTER SYMPTOMS: GENERAL WELL-BEING: GOOD

## 2018-06-06 ASSESSMENT — PATIENT HEALTH QUESTIONNAIRE - PHQ9: CLINICAL INTERPRETATION OF PHQ2 SCORE: 0

## 2018-06-06 ASSESSMENT — PAIN SCALES - GENERAL: PAINLEVEL: 5=MODERATE PAIN

## 2018-06-06 NOTE — LETTER
June 6, 2018        Patient: Sukumar Quiñonez   YOB: 1940   Date of Visit: 6/6/2018     Raul Bobo MD      Dear Dr. Bobo:    It is my is opinion that Sukumar Quiñonez medically cleared for right knee surgery in June 2018 .    If you have any questions or concerns, please don't hesitate to call.        Sincerely,          Edgardo Castano D.O.  Board Certified General Internal Medicine.     [address] [place] [address] [id number] [phone] (Phone)  [phone] (Fax)

## 2018-06-06 NOTE — PROGRESS NOTES
Chief Complaint   Patient presents with   • Annual Wellness Visit         HPI:  Sukumar is a 77 y.o. here for Medicare Annual Wellness Visit         Patient Active Problem List    Diagnosis Date Noted   • Acute medial meniscus tear of left knee 10/24/2017   • Wound of left lower extremity 08/15/2017   • Wound infection 08/07/2017   • Hypoglycemia 05/26/2017   • Elevated TSH 05/26/2017   • Type 2 diabetes mellitus ([facility]) 05/26/2017   • Morbid obesity with BMI of 40.0-44.9, adult ([facility]) 05/08/2017   • Type II diabetes mellitus, well controlled ([facility]) 01/18/2017   • HTN (hypertension) 01/27/2012   • Dyslipidemia 01/27/2012   • Aortic valve insufficiency 08/25/2011   • CML (chronic myelocytic leukemia) ([facility]) 08/25/2011       Current Outpatient Prescriptions   Medication Sig Dispense Refill   • doxazosin (CARDURA) 2 MG Tab Take 2 mg by mouth every day.     • furosemide (LASIX) 40 MG Tab TAKE ONE TABLET BY MOUTH ONCE DAILY 90 Tab 3   • benazepril (LOTENSIN) 10 MG Tab TAKE ONE TABLET BY MOUTH ONCE DAILY 90 Tab 3   • LANTUS 100 UNIT/ML Solution INJECT 10 UNITS SUBCUTANEOUSLY ONCE DAILY AT BEDTIME AS DIRECTED (Patient taking differently: INJECT 20 UNITS SUBCUTANEOUSLY ONCE DAILY AT BEDTIME AS DIRECTED) 10 mL 11   • calcitRIOL (ROCALTROL) 0.25 MCG Cap Take 0.25 mcg by mouth every 48 hours.     • Kristie, Zingiber officinalis, (KRISTIE PO) Take  by mouth.     • vitamin D (CHOLECALCIFEROL) 1000 UNIT Tab Take 2,000 Units by mouth every day.     • ascorbic acid (ASCORBIC ACID) 500 MG Tab Take 500 mg by mouth every day.     • KLOR-CON M20 20 MEQ Tab CR TAKE ONE TABLET BY MOUTH TWICE DAILY 180 Tab 3   • ranitidine (ZANTAC) 150 MG Tab TAKE ONE TABLET BY MOUTH TWICE DAILY 180 Tab 3   • imatinib (GLEEVEC) 100 MG TABS Take 300 mg by mouth every day.       No current facility-administered medications for this visit.         Patient is taking medications as noted in medication list.  Current supplements as per medication list.     Allergies:  Nkda [no known drug allergy]    Current social contact/activities: spend time with grand children and wife. Goes hunting       Is patient current with immunizations? Yes.    He  reports that he has never smoked. He has never used smokeless tobacco. He reports that he does not drink alcohol or use drugs.  Counseling given: Not Answered        DPA/Advanced directive: Patient does not have an Advanced Directive.  A packet and workshop information was given on Advanced Directives.    ROS:    Gait: Uses no assistive device    Ostomy: no    Other tubes: no    Amputations: no    Chronic oxygen use no    Last eye exam     Wears hearing aids: no    : Denies any urinary leakage during the last 6 months       Screening:    DIABETES    Has patient ever had diabetes education? Yes, and is NOT interested in more at this time.            Depression Screening    Little interest or pleasure in doing things?  0 - not at all  Feeling down, depressed, or hopeless? 0 - not at all  Patient Health Questionnaire Score: 0    If depressive symptoms identified deferred to follow up visit unless specifically addressed in assessment and plan.    Interpretation of PHQ-9 Total Score   Score Severity   1-4 No Depression   5-9 Mild Depression   10-14 Moderate Depression   15-19 Moderately Severe Depression   20-27 Severe Depression    Screening for Cognitive Impairment    Three Minute Recall (leader, season, table)  3/3    Zane clock face with all 12 numbers and set the hands to show 10 past 11.  Yes    If cognitive concerns identified, deferred for follow up unless specifically addressed in assessment and plan.    Fall Risk Assessment    Has the patient had two or more falls in the last year or any fall with injury in the last year?  No  If fall risk identified, deferred for follow up unless specifically addressed in assessment and plan.    Safety Assessment    Throw rugs on floor.  No  Handrails on all stairs.  Yes  Good lighting in all  hallways.  Yes  Difficulty hearing.  No  Patient counseled about all safety risks that were identified.    Functional Assessment ADLs    Are there any barriers preventing you from cooking for yourself or meeting nutritional needs?  No.    Are there any barriers preventing you from driving safely or obtaining transportation?  No.    Are there any barriers preventing you from using a telephone or calling for help?  No.    Are there any barriers preventing you from shopping?  No.    Are there any barriers preventing you from taking care of your own finances?  No.    Are there any barriers preventing you from managing your medications?  No.    Are there any barriers preventing you from showering, bathing or dressing yourself?  No.    Are you currently engaging in any exercise or physical activity?  Yes.  Hunting   What is your perception of your health?  Good.    Health Maintenance Summary                Annual Wellness Visit Overdue 1940     A1C SCREENING Overdue 3/1/2018      Done 9/1/2017 HEMOGLOBIN A1C      Patient has more history with this topic...    RETINAL SCREENING Overdue 3/7/2018      Done 3/7/2017 REFERRAL FOR RETINAL SCREENING EXAM     Patient has more history with this topic...    FASTING LIPID PROFILE Overdue 5/9/2018      Done 5/9/2017 LIPID PROFILE     Patient has more history with this topic...    COLONOSCOPY Next Due 12/9/2018      Done 12/9/2015 AMB REFERRAL TO GI FOR COLONOSCOPY     Patient has more history with this topic...    URINE ACR / MICROALBUMIN Next Due 2/20/2019      Done 2/20/2018 PROTEIN/CREAT RATIO URINE     Patient has more history with this topic...    DIABETES MONOFILAMENT / LE EXAM Next Due 3/7/2019      Done 3/7/2018 AMB DIABETIC MONOFILAMENT LOWER EXTREMITY EXAM     Patient has more history with this topic...    SERUM CREATININE Next Due 5/22/2019      Done 5/22/2018 BASIC METABOLIC PANEL      Patient has more history with this topic...    IMM DTaP/Tdap/Td Vaccine Next Due  7/2/2026      Done 7/2/2016 Imm Admin: Tdap Vaccine          Patient Care Team:  Edgardo Castano D.O. as PCP - General (Internal Medicine)  Ez Huang M.D. as Consulting Physician (Cardiology)  Miguel Cole M.D. as Consulting Physician (Urology)  Eagle Velez M.D. as Consulting Physician (Oncology)  Kendal Cotton O.D. as Consulting Physician (Optometry)  Albaro Crowder M.D. as Consulting Physician (Nephrology)    Social History   Substance Use Topics   • Smoking status: Never Smoker   • Smokeless tobacco: Never Used   • Alcohol use No     Family History   Problem Relation Age of Onset   • Other Mother 94     age.    • Hypertension Mother    • Heart Disease Father      He  has a past medical history of Cancer (HCC); Cataract; CML (chronic myelocytic leukemia) (HCC) (2011); Diabetes (2005); Heart burn; Heart valve disease; Hemorrhoid; High cholesterol; Hyperlipidemia; Hypertension; Indigestion; Murmur, heart; Renal disorder; Type II diabetes mellitus, well controlled (HCC) (1/18/2017); Unspecified urinary incontinence; and Urinary bladder disorder.   Past Surgical History:   Procedure Laterality Date   • KNEE ARTHROSCOPY Left 10/24/2017    Procedure: KNEE ARTHROSCOPY;  Surgeon: Raul Bobo M.D.;  Location: SURGERY [facility];  Service: Orthopedics   • MEDIAL MENISCECTOMY Left 10/24/2017    Procedure: MEDIAL MENISCECTOMY - PARTIAL;  Surgeon: Raul Bobo M.D.;  Location: SURGERY [facility];  Service: Orthopedics   • WIDE EXCISION  8/12/2014    Performed by Veronica Ruth M.D. at SURGERY SAME DAY [facility] ORS   • MARCY  1/30/2012    Performed by TREY TAPIA at ENDOSCOPY [facility] ORS   • COLON RESECTION LAPAROSCOPIC  8/30/2011    Performed by PRISCILLA ROGERS at SURGERY [facility] ORS   • CYSTOSCOPY  4/7/2011    Performed by MIGUEL COLE at SURGERY [facility] ORS   • SPHINCTER PROSTHESIS PLACEMENT  4/7/2011    Performed by MIGUEL COLE at SURGERY [facility] ORS   • CYSTOSCOPY   "1/25/2010    Performed by MIGUEL NEVILLE at SURGERY [facility]   • TRANS URETHRAL RESECTION PROSTATE  1/25/2010    Performed by MIGUEL NEVILLE at SURGERY [facility]   • CIRCUMCISION ADULT  8/17/2009    Performed by MIGUEL NEVILLE at SURGERY [facility]   • TRANS URETHRAL RESECTION PROSTATE  8/17/2009    Performed by MIGUEL NEVILLE at SURGERY [facility]   • VEIN STRIPPING  2007   • BRACHY THERAPY  06   • UMBILICAL HERNIA REPAIR  2005           Exam:     Blood pressure 110/60, pulse 76, temperature 36.3 °C (97.3 °F), resp. rate 18, height 1.854 m (6' 1\"), weight (!) 138.3 kg (305 lb), SpO2 94 %. Body mass index is 40.24 kg/m².    Hearing fair.    Dentition good  Alert, oriented in no acute distress.  Eye contact is good, speech goal directed, affect calm       Assessment and Plan. The following treatment and monitoring plan is recommended:     1. Type 2 diabetes mellitus with diabetic chronic kidney disease, unspecified CKD stage, unspecified whether long term insulin use ([facility])  Retinal exam completed in the office  - POCT Retinal Eye Exam  - HEMOGLOBIN A1C; Future    2. CML (chronic myelocytic leukemia) ([facility])  Treated    3. Dyslipidemia  Stable  - LIPID PROFILE; Future    4. Morbid obesity with BMI of 40.0-44.9, adult ([facility])  Discussed weight loss with the patient.      Services suggested: No services needed at this time  Health Care Screening recommendations as per orders if indicated.  Referrals offered: PT/OT/Nutrition counseling/Behavioral Health/Smoking cessation as per orders if indicated.    Discussion today about general wellness and lifestyle habits:    · Prevent falls and reduce trip hazards; Cautioned about securing or removing rugs.  · Have a working fire alarm and carbon monoxide detector;   · Engage in regular physical activity and social activities       Follow-up: No Follow-up on file.      Addendum: Patient and I have discussed his pending right knee surgery with Dr. Raul Bobo.  " Patient has no prior history of myocardial infarction no history of strokes, states on a level surface he can walk more than 2 blocks.  Has a creatinine less than 2.0.

## 2018-06-14 ENCOUNTER — HOSPITAL ENCOUNTER (OUTPATIENT)
Facility: MEDICAL CENTER | Age: 78
End: 2018-06-14
Attending: ORTHOPAEDIC SURGERY | Admitting: ORTHOPAEDIC SURGERY
Payer: MEDICARE

## 2018-06-14 VITALS
TEMPERATURE: 97.5 F | HEART RATE: 62 BPM | BODY MASS INDEX: 40.61 KG/M2 | SYSTOLIC BLOOD PRESSURE: 107 MMHG | OXYGEN SATURATION: 96 % | DIASTOLIC BLOOD PRESSURE: 59 MMHG | WEIGHT: 306.44 LBS | HEIGHT: 73 IN | RESPIRATION RATE: 16 BRPM

## 2018-06-14 DIAGNOSIS — G89.18 POSTOPERATIVE PAIN: ICD-10-CM

## 2018-06-14 LAB — GLUCOSE BLD-MCNC: 182 MG/DL (ref 65–99)

## 2018-06-14 PROCEDURE — 700111 HCHG RX REV CODE 636 W/ 250 OVERRIDE (IP): Performed by: ORTHOPAEDIC SURGERY

## 2018-06-14 PROCEDURE — 501838 HCHG SUTURE GENERAL: Performed by: ORTHOPAEDIC SURGERY

## 2018-06-14 PROCEDURE — 160046 HCHG PACU - 1ST 60 MINS PHASE II: Performed by: ORTHOPAEDIC SURGERY

## 2018-06-14 PROCEDURE — 700111 HCHG RX REV CODE 636 W/ 250 OVERRIDE (IP)

## 2018-06-14 PROCEDURE — 160047 HCHG PACU  - EA ADDL 30 MINS PHASE II: Performed by: ORTHOPAEDIC SURGERY

## 2018-06-14 PROCEDURE — A9270 NON-COVERED ITEM OR SERVICE: HCPCS | Performed by: ORTHOPAEDIC SURGERY

## 2018-06-14 PROCEDURE — 160002 HCHG RECOVERY MINUTES (STAT): Performed by: ORTHOPAEDIC SURGERY

## 2018-06-14 PROCEDURE — 700102 HCHG RX REV CODE 250 W/ 637 OVERRIDE(OP): Performed by: ORTHOPAEDIC SURGERY

## 2018-06-14 PROCEDURE — 160009 HCHG ANES TIME/MIN: Performed by: ORTHOPAEDIC SURGERY

## 2018-06-14 PROCEDURE — 160036 HCHG PACU - EA ADDL 30 MINS PHASE I: Performed by: ORTHOPAEDIC SURGERY

## 2018-06-14 PROCEDURE — 160048 HCHG OR STATISTICAL LEVEL 1-5: Performed by: ORTHOPAEDIC SURGERY

## 2018-06-14 PROCEDURE — 160035 HCHG PACU - 1ST 60 MINS PHASE I: Performed by: ORTHOPAEDIC SURGERY

## 2018-06-14 PROCEDURE — 160025 RECOVERY II MINUTES (STATS): Performed by: ORTHOPAEDIC SURGERY

## 2018-06-14 PROCEDURE — 502580 HCHG PACK, KNEE ARTHROSCOPY: Performed by: ORTHOPAEDIC SURGERY

## 2018-06-14 PROCEDURE — 700105 HCHG RX REV CODE 258: Performed by: ORTHOPAEDIC SURGERY

## 2018-06-14 PROCEDURE — 82962 GLUCOSE BLOOD TEST: CPT

## 2018-06-14 PROCEDURE — 160029 HCHG SURGERY MINUTES - 1ST 30 MINS LEVEL 4: Performed by: ORTHOPAEDIC SURGERY

## 2018-06-14 PROCEDURE — 700101 HCHG RX REV CODE 250

## 2018-06-14 RX ORDER — DIPHENHYDRAMINE HYDROCHLORIDE 50 MG/ML
25 INJECTION INTRAMUSCULAR; INTRAVENOUS EVERY 6 HOURS PRN
Status: DISCONTINUED | OUTPATIENT
Start: 2018-06-14 | End: 2018-06-14 | Stop reason: HOSPADM

## 2018-06-14 RX ORDER — BUPIVACAINE HYDROCHLORIDE AND EPINEPHRINE 2.5; 5 MG/ML; UG/ML
INJECTION, SOLUTION EPIDURAL; INFILTRATION; INTRACAUDAL; PERINEURAL
Status: DISCONTINUED | OUTPATIENT
Start: 2018-06-14 | End: 2018-06-14 | Stop reason: HOSPADM

## 2018-06-14 RX ORDER — SCOLOPAMINE TRANSDERMAL SYSTEM 1 MG/1
1 PATCH, EXTENDED RELEASE TRANSDERMAL
Status: DISCONTINUED | OUTPATIENT
Start: 2018-06-14 | End: 2018-06-14 | Stop reason: HOSPADM

## 2018-06-14 RX ORDER — SODIUM CHLORIDE, SODIUM LACTATE, POTASSIUM CHLORIDE, CALCIUM CHLORIDE 600; 310; 30; 20 MG/100ML; MG/100ML; MG/100ML; MG/100ML
1000 INJECTION, SOLUTION INTRAVENOUS
Status: DISCONTINUED | OUTPATIENT
Start: 2018-06-14 | End: 2018-06-14 | Stop reason: HOSPADM

## 2018-06-14 RX ORDER — OXYCODONE HYDROCHLORIDE 10 MG/1
10 TABLET ORAL
Status: DISCONTINUED | OUTPATIENT
Start: 2018-06-14 | End: 2018-06-14 | Stop reason: HOSPADM

## 2018-06-14 RX ORDER — HALOPERIDOL 5 MG/ML
1 INJECTION INTRAMUSCULAR EVERY 6 HOURS PRN
Status: DISCONTINUED | OUTPATIENT
Start: 2018-06-14 | End: 2018-06-14 | Stop reason: HOSPADM

## 2018-06-14 RX ORDER — HYDROCODONE BITARTRATE AND ACETAMINOPHEN 10; 325 MG/1; MG/1
1 TABLET ORAL EVERY 4 HOURS PRN
Qty: 30 TAB | Refills: 0 | Status: SHIPPED | OUTPATIENT
Start: 2018-06-14 | End: 2018-06-17

## 2018-06-14 RX ORDER — DEXAMETHASONE SODIUM PHOSPHATE 4 MG/ML
4 INJECTION, SOLUTION INTRA-ARTICULAR; INTRALESIONAL; INTRAMUSCULAR; INTRAVENOUS; SOFT TISSUE
Status: DISCONTINUED | OUTPATIENT
Start: 2018-06-14 | End: 2018-06-14 | Stop reason: HOSPADM

## 2018-06-14 RX ORDER — OXYCODONE HYDROCHLORIDE 5 MG/1
5 TABLET ORAL
Status: DISCONTINUED | OUTPATIENT
Start: 2018-06-14 | End: 2018-06-14 | Stop reason: HOSPADM

## 2018-06-14 RX ORDER — ONDANSETRON 2 MG/ML
4 INJECTION INTRAMUSCULAR; INTRAVENOUS EVERY 4 HOURS PRN
Status: DISCONTINUED | OUTPATIENT
Start: 2018-06-14 | End: 2018-06-14 | Stop reason: HOSPADM

## 2018-06-14 ASSESSMENT — PAIN SCALES - GENERAL
PAINLEVEL_OUTOF10: 2
PAINLEVEL_OUTOF10: 0
PAINLEVEL_OUTOF10: 2
PAINLEVEL_OUTOF10: 0
PAINLEVEL_OUTOF10: ASSUMED PAIN PRESENT
PAINLEVEL_OUTOF10: 0
PAINLEVEL_OUTOF10: 2
PAINLEVEL_OUTOF10: 4

## 2018-06-14 NOTE — DISCHARGE INSTRUCTIONS
ACTIVITY: Rest and take it easy for the first 24 hours.  A responsible adult is recommended to remain with you during that time.  It is normal to feel sleepy.  We encourage you to not do anything that requires balance, judgment or coordination.    MILD FLU-LIKE SYMPTOMS ARE NORMAL. YOU MAY EXPERIENCE GENERALIZED MUSCLE ACHES, THROAT IRRITATION, HEADACHE AND/OR SOME NAUSEA.    FOR 24 HOURS DO NOT:  Drive, operate machinery or run household appliances.  Drink beer or alcoholic beverages.   Make important decisions or sign legal documents.    SPECIAL INSTRUCTIONS: Activity is to be weight bearing as tolerated. Ice and elevate.    DIET: To avoid nausea, slowly advance diet as tolerated, avoiding spicy or greasy foods for the first day.  Add more substantial food to your diet according to your physician's instructions.  INCREASE FLUIDS AND FIBER TO AVOID CONSTIPATION.    SURGICAL DRESSING/BATHING: Keep dressings clean and dry. May remove dressings in 2 days (Saturday) and shower with wounds uncovered. Apply band-aids after shower. Do not soak or submerge incisions for at least 2 weeks.    FOLLOW-UP APPOINTMENT:  A follow-up appointment should be arranged with your doctor in; call to schedule.    You should CALL YOUR PHYSICIAN if you develop:  Fever greater than 101 degrees F.  Pain not relieved by medication, or persistent nausea or vomiting.  Excessive bleeding (blood soaking through dressing) or unexpected drainage from the wound.  Extreme redness or swelling around the incision site, drainage of pus or foul smelling drainage.  Inability to urinate or empty your bladder within 8 hours.    You should call 911 if you develop problems with breathing or chest pain.    If you are unable to contact your doctor or surgical center, you should go to the nearest emergency room or urgent care center.      Physician's telephone #: Dr. Bobo [phone]    If any questions arise, call your doctor.  If your doctor is not available,  please feel free to call the Surgical Center at [phone].  The Center is open Monday through Friday from 7AM to 7PM.  You can also call the HEALTH HOTLINE open 24 hours/day, 7 days/week and speak to a nurse at [phone], or toll free at [phone].    A registered nurse may call you a few days after your surgery to see how you are doing after your procedure.    MEDICATIONS: Resume taking daily medication.  Take prescribed pain medication with food.  If no medication is prescribed, you may take non-aspirin pain medication if needed.  PAIN MEDICATION CAN BE VERY CONSTIPATING.  Take a stool softener or laxative such as senokot, pericolace, or milk of magnesia if needed.    Prescription given for Norco.  Last pain medication given at None given .    If your physician has prescribed pain medication that includes Acetaminophen (Tylenol), do not take additional Acetaminophen (Tylenol) while taking the prescribed medication.    Depression / Suicide Risk    As you are discharged from this [facility] facility, it is important to learn how to keep safe from harming yourself.    Recognize the warning signs:  · Abrupt changes in personality, positive or negative- including increase in energy   · Giving away possessions  · Change in eating patterns- significant weight changes-  positive or negative  · Change in sleeping patterns- unable to sleep or sleeping all the time   · Unwillingness or inability to communicate  · Depression  · Unusual sadness, discouragement and loneliness  · Talk of wanting to die  · Neglect of personal appearance   · Rebelliousness- reckless behavior  · Withdrawal from people/activities they love  · Confusion- inability to concentrate     If you or a loved one observes any of these behaviors or has concerns about self-harm, here's what you can do:  · Talk about it- your feelings and reasons for harming yourself  · Remove any means that you might use to hurt yourself (examples: pills, rope,  extension cords, firearm)  · Get professional help from the community (Mental Health, Substance Abuse, psychological counseling)  · Do not be alone:Call your Safe Contact- someone whom you trust who will be there for you.  · Call your local CRISIS HOTLINE 077-7327 or 343-904-4360  · Call your local Children's Mobile Crisis Response Team [place] (353) 304-6700 or www.Stemnion  · Call the toll free National Suicide Prevention Hotlines   · National Suicide Prevention Lifeline 009-354-AIDF (8925)  · National Hope Line Network 800-SUICIDE (401-6455)

## 2018-06-14 NOTE — OR NURSING
0800 Report received from RAJ Yu. Pt awake, alert and oriented. Pt denies pain and nausea. Will begin to wean pt off of oxygen. Positive +2 pedal pulse to RLE, cap refill < 3 seconds   0810 Pt encouraged to cough and deep breath   0825 Pt instructed on use of IS Pt able to meet goal   0840 No change. Pt has momentary desaturations to mid 80s and comes back up to 90s quickly. Pt encouraged to cough and deep breath and use IS. Pt c/o butt and back hurting, states he would like to get dressed and get out of gurney   0900 No change. Pt states pain is tolerable and denies nausea. VSS   0915 Report to RAJ Ybarra. Pts VSS. Pt still has occasional desaturations. RAJ Ybarra aware that pt needs more time to monitor oxygen

## 2018-06-14 NOTE — OP REPORT
DATE OF SERVICE:  06/14/2018    PREOPERATIVE DIAGNOSIS:  Right knee medial meniscal tear.    POSTOPERATIVE DIAGNOSES:  1.  Right knee complex posterior horn medial meniscus tear.  2.  Complex posterior horn lateral meniscus tear.  3.  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  4.  Grade II chondromalacia of the patella.    PROCEDURES:  1.  Right knee diagnostic arthroscopy with arthroscopic partial medial and   lateral meniscectomies.  2.  Right knee arthroscopic chondroplasty of the lateral tibial plateau and   the patella.    SURGEON:  Raul Bobo MD    ASSISTANT:  None.    ANESTHESIA:  General.    ANESTHESIOLOGIST:  Dashawn Ashton MD    IMPLANTS:  None.    COMPLICATIONS:  None.    DISPOSITION:  Stable to postanesthesia care unit.    INDICATIONS:  The patient is a very pleasant gentleman, who has had   progressive knee pain, which has been unresponsive to conservative management.    The risks, benefits, alternatives, and limitations of surgical intervention   were discussed in detail.  He expressed understanding and desired to proceed.    DESCRIPTION OF PROCEDURE:  The patient and the correct operative extremity   were identified in the preoperative area.  The knee was marked.  He was   brought to the operating room where the correct operative extremity again   confirmed.  He was placed supine on the OR table where he underwent general   anesthesia without complication.  Examination under anesthesia showed full   range of motion, no instability.  Knee was prepped with alcohol and injected   with 30 mL of 1% lidocaine with epinephrine.  The knee was then prepped and   draped in the usual sterile fashion using ChloraPrep.  Diagnostic arthroscopy   was then performed, which showed some grade II chondromalacia of the patella.    The trochlea was intact.  The notch showed an intact ACL and PCL.  The medial   compartment showed a complex tear of the posterior horn of the medial   meniscus  with a large unstable flap fragment, which was flipped into the   gutter.  There was an area of grade III chondromalacia of the medial femoral   condyle, right over the torn part of the meniscus.  Partial medial   meniscectomy was performed with a duckbill resector and the arthroscopic   shaver.  Chondroplasty performed of the medial femoral condyle.  Lateral   compartment showed small area on the medial aspect of the lateral femoral   condyle near the spine and had some grade IV chondromalacia.  There was a   complex tear of the posterior horn of the lateral meniscus.  Partial lateral   meniscectomy was performed with a duckbill resector and the arthroscopic   shaver.  Chondroplasty was then performed of the patella.  The gutters and the   suprapatellar pouch were checked for loose bodies, none were identified.  A   spinal needle placed into the suprapatellar pouch under direct vision.  The   fluid removed from the knee.  The scope was withdrawn.  The portals closed   with 3-0 nylon.  The knee injected with 0.5% bupivacaine with epinephrine.    Sterile dressings were applied.  The knee was loosely overwrapped with an Ace   wrap.  The patient was then allowed to awake from anesthesia, transferred to   his hospital cart, and taken to postanesthesia care unit in stable condition.    He tolerated the procedure well.  There were no immediate complications.       ____________________________________     ANA NEVAREZ MD RD / ADEEL    DD:  06/14/2018 07:37:43  DT:  06/14/2018 07:48:00    D#:  [id number]  Job#:  [id number]

## 2018-06-14 NOTE — OR NURSING
0727: To PACU post right knee arthroscopy. OPA in place. Strong pulse noted. SCDs applied.  0737: OPA dc'd, breathing is spontaneous and unlabored. Pt denies pain or nausea at this time.  0745: Pt sleeping. Awakens easily, denies pain or nausea.  0800: Remains pain/nausea free. Report given to YONNY Lauren RN.

## 2018-06-14 NOTE — OR NURSING
1110 Pt initially unable to maintain oxygen saturation of at least 90%. Pt encouraged to DB&C and use IS. Pt discharged to the care of family after able to meet oxygenation parameters.

## 2018-06-18 ENCOUNTER — HOSPITAL ENCOUNTER (OUTPATIENT)
Dept: LAB | Facility: MEDICAL CENTER | Age: 78
End: 2018-06-18
Attending: INTERNAL MEDICINE
Payer: MEDICARE

## 2018-06-18 DIAGNOSIS — E78.5 DYSLIPIDEMIA: Chronic | ICD-10-CM

## 2018-06-18 DIAGNOSIS — E11.22 TYPE 2 DIABETES MELLITUS WITH DIABETIC CHRONIC KIDNEY DISEASE, UNSPECIFIED CKD STAGE, UNSPECIFIED WHETHER LONG TERM INSULIN USE (HCC): Chronic | ICD-10-CM

## 2018-06-18 LAB
CHOLEST SERPL-MCNC: 146 MG/DL (ref 100–199)
EST. AVERAGE GLUCOSE BLD GHB EST-MCNC: 189 MG/DL
HBA1C MFR BLD: 8.2 % (ref 0–5.6)
HDLC SERPL-MCNC: 42 MG/DL
LDLC SERPL CALC-MCNC: 86 MG/DL
TRIGL SERPL-MCNC: 91 MG/DL (ref 0–149)

## 2018-06-18 PROCEDURE — 80061 LIPID PANEL: CPT

## 2018-06-18 PROCEDURE — 83036 HEMOGLOBIN GLYCOSYLATED A1C: CPT

## 2018-06-18 PROCEDURE — 36415 COLL VENOUS BLD VENIPUNCTURE: CPT

## 2018-06-20 LAB — RETINAL SCREEN: NEGATIVE

## 2018-07-12 ENCOUNTER — HOSPITAL ENCOUNTER (OUTPATIENT)
Dept: CARDIOLOGY | Facility: MEDICAL CENTER | Age: 78
End: 2018-07-12
Attending: INTERNAL MEDICINE
Payer: MEDICARE

## 2018-07-12 ENCOUNTER — HOSPITAL ENCOUNTER (OUTPATIENT)
Dept: LAB | Facility: MEDICAL CENTER | Age: 78
End: 2018-07-12
Attending: INTERNAL MEDICINE
Payer: MEDICARE

## 2018-07-12 DIAGNOSIS — I35.1 AORTIC VALVE INSUFFICIENCY, ETIOLOGY OF CARDIAC VALVE DISEASE UNSPECIFIED: ICD-10-CM

## 2018-07-12 DIAGNOSIS — I71.20 THORACIC AORTIC ANEURYSM WITHOUT RUPTURE (HCC): ICD-10-CM

## 2018-07-12 LAB
ALBUMIN SERPL BCP-MCNC: 4.1 G/DL (ref 3.2–4.9)
ALBUMIN/GLOB SERPL: 2 G/DL
ALP SERPL-CCNC: 66 U/L (ref 30–99)
ALT SERPL-CCNC: 11 U/L (ref 2–50)
ANION GAP SERPL CALC-SCNC: 9 MMOL/L (ref 0–11.9)
AST SERPL-CCNC: 19 U/L (ref 12–45)
BASOPHILS # BLD AUTO: 0.5 % (ref 0–1.8)
BASOPHILS # BLD: 0.03 K/UL (ref 0–0.12)
BILIRUB SERPL-MCNC: 0.5 MG/DL (ref 0.1–1.5)
BUN SERPL-MCNC: 34 MG/DL (ref 8–22)
CALCIUM SERPL-MCNC: 8.7 MG/DL (ref 8.5–10.5)
CHLORIDE SERPL-SCNC: 106 MMOL/L (ref 96–112)
CHOLEST SERPL-MCNC: 169 MG/DL (ref 100–199)
CO2 SERPL-SCNC: 24 MMOL/L (ref 20–33)
CREAT SERPL-MCNC: 1.66 MG/DL (ref 0.5–1.4)
EOSINOPHIL # BLD AUTO: 0.32 K/UL (ref 0–0.51)
EOSINOPHIL NFR BLD: 5.7 % (ref 0–6.9)
ERYTHROCYTE [DISTWIDTH] IN BLOOD BY AUTOMATED COUNT: 48.6 FL (ref 35.9–50)
GLOBULIN SER CALC-MCNC: 2.1 G/DL (ref 1.9–3.5)
GLUCOSE SERPL-MCNC: 204 MG/DL (ref 65–99)
HCT VFR BLD AUTO: 36.8 % (ref 42–52)
HDLC SERPL-MCNC: 46 MG/DL
HGB BLD-MCNC: 11.8 G/DL (ref 14–18)
IMM GRANULOCYTES # BLD AUTO: 0.02 K/UL (ref 0–0.11)
IMM GRANULOCYTES NFR BLD AUTO: 0.4 % (ref 0–0.9)
LDLC SERPL CALC-MCNC: 109 MG/DL
LV EJECT FRACT MOD 2C 99903: 55.64
LV EJECT FRACT MOD 4C 99902: 51.36
LV EJECT FRACT MOD BP 99901: 53.83
LYMPHOCYTES # BLD AUTO: 0.85 K/UL (ref 1–4.8)
LYMPHOCYTES NFR BLD: 15.2 % (ref 22–41)
MCH RBC QN AUTO: 31.1 PG (ref 27–33)
MCHC RBC AUTO-ENTMCNC: 32.1 G/DL (ref 33.7–35.3)
MCV RBC AUTO: 97.1 FL (ref 81.4–97.8)
MONOCYTES # BLD AUTO: 0.57 K/UL (ref 0–0.85)
MONOCYTES NFR BLD AUTO: 10.2 % (ref 0–13.4)
NEUTROPHILS # BLD AUTO: 3.82 K/UL (ref 1.82–7.42)
NEUTROPHILS NFR BLD: 68 % (ref 44–72)
NRBC # BLD AUTO: 0 K/UL
NRBC BLD-RTO: 0 /100 WBC
PLATELET # BLD AUTO: 134 K/UL (ref 164–446)
PMV BLD AUTO: 11.6 FL (ref 9–12.9)
POTASSIUM SERPL-SCNC: 4.6 MMOL/L (ref 3.6–5.5)
PROT SERPL-MCNC: 6.2 G/DL (ref 6–8.2)
RBC # BLD AUTO: 3.79 M/UL (ref 4.7–6.1)
SODIUM SERPL-SCNC: 139 MMOL/L (ref 135–145)
TRIGL SERPL-MCNC: 69 MG/DL (ref 0–149)
WBC # BLD AUTO: 5.6 K/UL (ref 4.8–10.8)

## 2018-07-12 PROCEDURE — 85025 COMPLETE CBC W/AUTO DIFF WBC: CPT

## 2018-07-12 PROCEDURE — 36415 COLL VENOUS BLD VENIPUNCTURE: CPT

## 2018-07-12 PROCEDURE — 80061 LIPID PANEL: CPT

## 2018-07-12 PROCEDURE — 93306 TTE W/DOPPLER COMPLETE: CPT

## 2018-07-12 PROCEDURE — 80053 COMPREHEN METABOLIC PANEL: CPT

## 2018-07-17 ENCOUNTER — TELEPHONE (OUTPATIENT)
Dept: MEDICAL GROUP | Facility: CLINIC | Age: 78
End: 2018-07-17

## 2018-07-17 DIAGNOSIS — I71.20 THORACIC AORTIC ANEURYSM WITHOUT RUPTURE (HCC): ICD-10-CM

## 2018-07-17 DIAGNOSIS — I35.1 NONRHEUMATIC AORTIC VALVE INSUFFICIENCY: ICD-10-CM

## 2018-07-17 NOTE — TELEPHONE ENCOUNTER
1. Caller Name: [facility]                                         Call Back Number: [phone]      Patient approves a detailed voicemail message: N\A     pt is needing an updated referral for Dr Ez Huang at [facility].  His appt is tomorrow, 7/18/2018.  Please use ICD10 codes I35.1 and I71.2.

## 2018-08-29 ENCOUNTER — HOSPITAL ENCOUNTER (OUTPATIENT)
Dept: LAB | Facility: MEDICAL CENTER | Age: 78
End: 2018-08-29
Attending: INTERNAL MEDICINE
Payer: MEDICARE

## 2018-08-29 PROCEDURE — 36415 COLL VENOUS BLD VENIPUNCTURE: CPT

## 2018-08-29 PROCEDURE — 81206 BCR/ABL1 GENE MAJOR BP: CPT

## 2018-09-04 LAB
PATHOLOGY STUDY: ABNORMAL
SPECIMEN SOURCE: ABNORMAL
T(ABL1,BCR)P210 BLD/T QL: DETECTED
T(ABL1,BCR)P210/CONTROL (IS) BLD/T: 0.01 %
T(ABL1,BCR)P210/CONTROL BLD/T: 0 %

## 2018-09-20 ENCOUNTER — HOSPITAL ENCOUNTER (OUTPATIENT)
Dept: LAB | Facility: MEDICAL CENTER | Age: 78
End: 2018-09-20
Attending: INTERNAL MEDICINE
Payer: MEDICARE

## 2018-09-20 ENCOUNTER — OFFICE VISIT (OUTPATIENT)
Dept: MEDICAL GROUP | Facility: LAB | Age: 78
End: 2018-09-20
Payer: MEDICARE

## 2018-09-20 VITALS
BODY MASS INDEX: 38.17 KG/M2 | HEART RATE: 66 BPM | TEMPERATURE: 98 F | OXYGEN SATURATION: 95 % | HEIGHT: 73 IN | RESPIRATION RATE: 14 BRPM | DIASTOLIC BLOOD PRESSURE: 74 MMHG | WEIGHT: 288 LBS | SYSTOLIC BLOOD PRESSURE: 122 MMHG

## 2018-09-20 DIAGNOSIS — Z79.4 TYPE 2 DIABETES MELLITUS WITHOUT COMPLICATION, WITH LONG-TERM CURRENT USE OF INSULIN (HCC): ICD-10-CM

## 2018-09-20 DIAGNOSIS — E66.01 CLASS 3 SEVERE OBESITY DUE TO EXCESS CALORIES WITHOUT SERIOUS COMORBIDITY WITH BODY MASS INDEX (BMI) OF 40.0 TO 44.9 IN ADULT (HCC): ICD-10-CM

## 2018-09-20 DIAGNOSIS — E11.9 TYPE 2 DIABETES MELLITUS WITHOUT COMPLICATION, WITH LONG-TERM CURRENT USE OF INSULIN (HCC): ICD-10-CM

## 2018-09-20 DIAGNOSIS — N28.9 RENAL INSUFFICIENCY: ICD-10-CM

## 2018-09-20 DIAGNOSIS — Z23 NEED FOR IMMUNIZATION AGAINST INFLUENZA: ICD-10-CM

## 2018-09-20 DIAGNOSIS — C92.10 CML (CHRONIC MYELOCYTIC LEUKEMIA) (HCC): Chronic | ICD-10-CM

## 2018-09-20 LAB
ALBUMIN SERPL BCP-MCNC: 3.9 G/DL (ref 3.2–4.9)
ALBUMIN/GLOB SERPL: 1.6 G/DL
ALP SERPL-CCNC: 54 U/L (ref 30–99)
ALT SERPL-CCNC: 12 U/L (ref 2–50)
ANION GAP SERPL CALC-SCNC: 10 MMOL/L (ref 0–11.9)
AST SERPL-CCNC: 33 U/L (ref 12–45)
BASOPHILS # BLD AUTO: 0.8 % (ref 0–1.8)
BASOPHILS # BLD: 0.04 K/UL (ref 0–0.12)
BILIRUB SERPL-MCNC: 0.6 MG/DL (ref 0.1–1.5)
BUN SERPL-MCNC: 30 MG/DL (ref 8–22)
CALCIUM SERPL-MCNC: 8.8 MG/DL (ref 8.5–10.5)
CHLORIDE SERPL-SCNC: 108 MMOL/L (ref 96–112)
CO2 SERPL-SCNC: 21 MMOL/L (ref 20–33)
CREAT SERPL-MCNC: 1.88 MG/DL (ref 0.5–1.4)
EOSINOPHIL # BLD AUTO: 0.25 K/UL (ref 0–0.51)
EOSINOPHIL NFR BLD: 4.8 % (ref 0–6.9)
ERYTHROCYTE [DISTWIDTH] IN BLOOD BY AUTOMATED COUNT: 51.4 FL (ref 35.9–50)
EST. AVERAGE GLUCOSE BLD GHB EST-MCNC: 157 MG/DL
GLOBULIN SER CALC-MCNC: 2.4 G/DL (ref 1.9–3.5)
GLUCOSE SERPL-MCNC: 116 MG/DL (ref 65–99)
HBA1C MFR BLD: 7.1 % (ref 0–5.6)
HCT VFR BLD AUTO: 37.3 % (ref 42–52)
HGB BLD-MCNC: 11.9 G/DL (ref 14–18)
IMM GRANULOCYTES # BLD AUTO: 0.01 K/UL (ref 0–0.11)
IMM GRANULOCYTES NFR BLD AUTO: 0.2 % (ref 0–0.9)
LYMPHOCYTES # BLD AUTO: 0.96 K/UL (ref 1–4.8)
LYMPHOCYTES NFR BLD: 18.6 % (ref 22–41)
MCH RBC QN AUTO: 31.2 PG (ref 27–33)
MCHC RBC AUTO-ENTMCNC: 31.9 G/DL (ref 33.7–35.3)
MCV RBC AUTO: 97.6 FL (ref 81.4–97.8)
MONOCYTES # BLD AUTO: 0.41 K/UL (ref 0–0.85)
MONOCYTES NFR BLD AUTO: 7.9 % (ref 0–13.4)
NEUTROPHILS # BLD AUTO: 3.49 K/UL (ref 1.82–7.42)
NEUTROPHILS NFR BLD: 67.7 % (ref 44–72)
NRBC # BLD AUTO: 0 K/UL
NRBC BLD-RTO: 0 /100 WBC
PLATELET # BLD AUTO: 124 K/UL (ref 164–446)
PMV BLD AUTO: 12.4 FL (ref 9–12.9)
POTASSIUM SERPL-SCNC: 4.4 MMOL/L (ref 3.6–5.5)
PROT SERPL-MCNC: 6.3 G/DL (ref 6–8.2)
RBC # BLD AUTO: 3.82 M/UL (ref 4.7–6.1)
SODIUM SERPL-SCNC: 139 MMOL/L (ref 135–145)
WBC # BLD AUTO: 5.2 K/UL (ref 4.8–10.8)

## 2018-09-20 PROCEDURE — 83036 HEMOGLOBIN GLYCOSYLATED A1C: CPT

## 2018-09-20 PROCEDURE — 36415 COLL VENOUS BLD VENIPUNCTURE: CPT

## 2018-09-20 PROCEDURE — 80053 COMPREHEN METABOLIC PANEL: CPT

## 2018-09-20 PROCEDURE — 99214 OFFICE O/P EST MOD 30 MIN: CPT | Mod: 25 | Performed by: INTERNAL MEDICINE

## 2018-09-20 PROCEDURE — 90662 IIV NO PRSV INCREASED AG IM: CPT | Performed by: INTERNAL MEDICINE

## 2018-09-20 PROCEDURE — 85025 COMPLETE CBC W/AUTO DIFF WBC: CPT

## 2018-09-20 PROCEDURE — G0008 ADMIN INFLUENZA VIRUS VAC: HCPCS | Performed by: INTERNAL MEDICINE

## 2018-09-20 RX ORDER — ATORVASTATIN CALCIUM 10 MG/1
10 TABLET, FILM COATED ORAL NIGHTLY
COMMUNITY

## 2018-09-20 NOTE — PROGRESS NOTES
CC: Sukumar Quiñonez is a 77 y.o. male is suffering from   Chief Complaint   Patient presents with   • Follow-Up     3 months         SUBJECTIVE:  1. Need for immunization against influenza  Sukumar is in need of an influenza vaccination which was given today.    2. CML (chronic myelocytic leukemia) (HCC)  Stable followed by oncology    3. Class 3 severe obesity due to excess calories without serious comorbidity with body mass index (BMI) of 40.0 to 44.9 in adult ([facility])  Discussed the need for weight loss.    4. Renal insufficiency  Labs ordered    5. Type 2 diabetes mellitus without complication, with long-term current use of insulin ([facility])  Clinically stable        Past social, family, history:   Social History   Substance Use Topics   • Smoking status: Never Smoker   • Smokeless tobacco: Never Used   • Alcohol use No         MEDICATIONS:    Current Outpatient Prescriptions:   •  liraglutide (VICTOZA) 18 MG/3ML Solution Pen-injector injection, Inject 0.6 mg as instructed every day., Disp: , Rfl:   •  atorvastatin (LIPITOR) 10 MG Tab, Take 10 mg by mouth every evening., Disp: , Rfl:   •  doxazosin (CARDURA) 2 MG Tab, Take 2 mg by mouth every day., Disp: , Rfl:   •  furosemide (LASIX) 40 MG Tab, TAKE ONE TABLET BY MOUTH ONCE DAILY, Disp: 90 Tab, Rfl: 3  •  benazepril (LOTENSIN) 10 MG Tab, TAKE ONE TABLET BY MOUTH ONCE DAILY, Disp: 90 Tab, Rfl: 3  •  LANTUS 100 UNIT/ML Solution, INJECT 10 UNITS SUBCUTANEOUSLY ONCE DAILY AT BEDTIME AS DIRECTED (Patient taking differently: INJECT 20 UNITS SUBCUTANEOUSLY ONCE DAILY AT BEDTIME AS DIRECTED), Disp: 10 mL, Rfl: 11  •  calcitRIOL (ROCALTROL) 0.25 MCG Cap, Take 0.25 mcg by mouth every 48 hours., Disp: , Rfl:   •  vitamin D (CHOLECALCIFEROL) 1000 UNIT Tab, Take 2,000 Units by mouth every day., Disp: , Rfl:   •  KLOR-CON M20 20 MEQ Tab CR, TAKE ONE TABLET BY MOUTH TWICE DAILY, Disp: 180 Tab, Rfl: 3  •  ranitidine (ZANTAC) 150 MG Tab, TAKE ONE TABLET BY MOUTH TWICE DAILY,  "Disp: 180 Tab, Rfl: 3  •  imatinib (GLEEVEC) 100 MG TABS, Take 300 mg by mouth every day., Disp: , Rfl:   •  Kristie, Zingiber officinalis, (KRISTIE PO), Take  by mouth., Disp: , Rfl:   •  ascorbic acid (ASCORBIC ACID) 500 MG Tab, Take 500 mg by mouth every day., Disp: , Rfl:     PROBLEMS:  Patient Active Problem List    Diagnosis Date Noted   • Postoperative pain 06/14/2018   • Acute medial meniscus tear of left knee 10/24/2017   • Wound of left lower extremity 08/15/2017   • Wound infection 08/07/2017   • Hypoglycemia 05/26/2017   • Elevated TSH 05/26/2017   • Type 2 diabetes mellitus (HCC) 05/26/2017   • Morbid obesity with BMI of 40.0-44.9, adult ([facility]) 05/08/2017   • Type II diabetes mellitus, well controlled ([facility]) 01/18/2017   • HTN (hypertension) 01/27/2012   • Dyslipidemia 01/27/2012   • Aortic valve insufficiency 08/25/2011   • CML (chronic myelocytic leukemia) ([facility]) 08/25/2011       REVIEW OF SYSTEMS:  Gen.:  No Nausea, Vomiting, fever, Chills.  Heart: No chest pain.  Lungs:  No shortness of Breath.  Psychological: Carlos unusual Anxiety depression     PHYSICAL EXAM   Constitutional: Alert, cooperative, not in acute distress.  Cardiovascular:  Rate Rhythm is regular without murmurs rubs clicks.     Thorax & Lungs: Clear to auscultation, no wheezing, rhonchi, or rales  HENT: Normocephalic, Atraumatic.  Eyes: PERRLA, EOMI, Conjunctiva normal.   Neck: Trachia is midline no swelling of the thyroid.   Lymphatic: No lymphadenopathy noted.   Neurologic: Alert & oriented x 3, cranial nerves II through XII are intact, Normal motor function, Normal sensory function, No focal deficits noted.   Psychiatric: Affect normal, Judgment normal, Mood normal.     VITAL SIGNS:/74   Pulse 66   Temp 36.7 °C (98 °F) (Temporal)   Resp 14   Ht 1.854 m (6' 1\")   Wt (!) 130.6 kg (288 lb)   SpO2 95%   BMI 38.00 kg/m²     Labs: Reviewed    Assessment:                                                     Plan:    1. Need for " immunization against influenza  Vaccination given  - INFLUENZA VACCINE, HIGH DOSE (65+ ONLY)    2. CML (chronic myelocytic leukemia) ([facility])  Recheck CBC 3 months  - CBC WITH DIFFERENTIAL; Future    3. Class 3 severe obesity due to excess calories without serious comorbidity with body mass index (BMI) of 40.0 to 44.9 in adult ([facility])  Discussed weight loss  - Patient identified as having weight management issue.  Appropriate orders and counseling given.    4. Renal insufficiency  Recheck conference metabolic panel  - COMP METABOLIC PANEL; Future    5. Type 2 diabetes mellitus without complication, with long-term current use of insulin ([facility])  Labs ordered 3 months  - HEMOGLOBIN A1C; Future

## 2018-10-04 ENCOUNTER — HOSPITAL ENCOUNTER (OUTPATIENT)
Dept: LAB | Facility: MEDICAL CENTER | Age: 78
End: 2018-10-04
Attending: INTERNAL MEDICINE
Payer: MEDICARE

## 2018-10-04 LAB
25(OH)D3 SERPL-MCNC: 45 NG/ML (ref 30–100)
ALBUMIN SERPL BCP-MCNC: 3.9 G/DL (ref 3.2–4.9)
ALBUMIN/GLOB SERPL: 1.6 G/DL
ALP SERPL-CCNC: 52 U/L (ref 30–99)
ALT SERPL-CCNC: 14 U/L (ref 2–50)
ANION GAP SERPL CALC-SCNC: 9 MMOL/L (ref 0–11.9)
APPEARANCE UR: CLEAR
AST SERPL-CCNC: 25 U/L (ref 12–45)
BASOPHILS # BLD AUTO: 0.8 % (ref 0–1.8)
BASOPHILS # BLD: 0.04 K/UL (ref 0–0.12)
BILIRUB SERPL-MCNC: 0.5 MG/DL (ref 0.1–1.5)
BILIRUB UR QL STRIP.AUTO: NEGATIVE
BUN SERPL-MCNC: 32 MG/DL (ref 8–22)
CALCIUM SERPL-MCNC: 9 MG/DL (ref 8.5–10.5)
CHLORIDE SERPL-SCNC: 111 MMOL/L (ref 96–112)
CO2 SERPL-SCNC: 21 MMOL/L (ref 20–33)
COLOR UR: YELLOW
CREAT SERPL-MCNC: 1.86 MG/DL (ref 0.5–1.4)
CREAT UR-MCNC: 177.9 MG/DL
EOSINOPHIL # BLD AUTO: 0.47 K/UL (ref 0–0.51)
EOSINOPHIL NFR BLD: 9.3 % (ref 0–6.9)
ERYTHROCYTE [DISTWIDTH] IN BLOOD BY AUTOMATED COUNT: 55.6 FL (ref 35.9–50)
GLOBULIN SER CALC-MCNC: 2.5 G/DL (ref 1.9–3.5)
GLUCOSE SERPL-MCNC: 134 MG/DL (ref 65–99)
GLUCOSE UR STRIP.AUTO-MCNC: NEGATIVE MG/DL
HCT VFR BLD AUTO: 40.7 % (ref 42–52)
HGB BLD-MCNC: 12.4 G/DL (ref 14–18)
IMM GRANULOCYTES # BLD AUTO: 0.02 K/UL (ref 0–0.11)
IMM GRANULOCYTES NFR BLD AUTO: 0.4 % (ref 0–0.9)
KETONES UR STRIP.AUTO-MCNC: NEGATIVE MG/DL
LEUKOCYTE ESTERASE UR QL STRIP.AUTO: NEGATIVE
LYMPHOCYTES # BLD AUTO: 1.02 K/UL (ref 1–4.8)
LYMPHOCYTES NFR BLD: 20.1 % (ref 22–41)
MAGNESIUM SERPL-MCNC: 2.3 MG/DL (ref 1.5–2.5)
MCH RBC QN AUTO: 31.1 PG (ref 27–33)
MCHC RBC AUTO-ENTMCNC: 30.5 G/DL (ref 33.7–35.3)
MCV RBC AUTO: 102 FL (ref 81.4–97.8)
MICRO URNS: NORMAL
MONOCYTES # BLD AUTO: 0.39 K/UL (ref 0–0.85)
MONOCYTES NFR BLD AUTO: 7.7 % (ref 0–13.4)
NEUTROPHILS # BLD AUTO: 3.13 K/UL (ref 1.82–7.42)
NEUTROPHILS NFR BLD: 61.7 % (ref 44–72)
NITRITE UR QL STRIP.AUTO: NEGATIVE
NRBC # BLD AUTO: 0 K/UL
NRBC BLD-RTO: 0 /100 WBC
PH UR STRIP.AUTO: 5.5 [PH]
PHOSPHATE SERPL-MCNC: 2.8 MG/DL (ref 2.5–4.5)
PLATELET # BLD AUTO: 133 K/UL (ref 164–446)
PMV BLD AUTO: 11.6 FL (ref 9–12.9)
POTASSIUM SERPL-SCNC: 4.6 MMOL/L (ref 3.6–5.5)
PROT SERPL-MCNC: 6.4 G/DL (ref 6–8.2)
PROT UR QL STRIP: NEGATIVE MG/DL
PROT UR-MCNC: 15.2 MG/DL (ref 0–15)
PROT/CREAT UR: 85 MG/G (ref 15–68)
PTH-INTACT SERPL-MCNC: 71.9 PG/ML (ref 14–72)
RBC # BLD AUTO: 3.99 M/UL (ref 4.7–6.1)
RBC UR QL AUTO: NEGATIVE
SODIUM SERPL-SCNC: 141 MMOL/L (ref 135–145)
SP GR UR STRIP.AUTO: 1.02
URATE SERPL-MCNC: 7.6 MG/DL (ref 2.5–8.3)
UROBILINOGEN UR STRIP.AUTO-MCNC: 0.2 MG/DL
WBC # BLD AUTO: 5.1 K/UL (ref 4.8–10.8)

## 2018-10-04 PROCEDURE — 83970 ASSAY OF PARATHORMONE: CPT

## 2018-10-04 PROCEDURE — 84156 ASSAY OF PROTEIN URINE: CPT

## 2018-10-04 PROCEDURE — 82306 VITAMIN D 25 HYDROXY: CPT

## 2018-10-04 PROCEDURE — 82570 ASSAY OF URINE CREATININE: CPT

## 2018-10-04 PROCEDURE — 36415 COLL VENOUS BLD VENIPUNCTURE: CPT

## 2018-10-04 PROCEDURE — 84550 ASSAY OF BLOOD/URIC ACID: CPT

## 2018-10-04 PROCEDURE — 85025 COMPLETE CBC W/AUTO DIFF WBC: CPT

## 2018-10-04 PROCEDURE — 80053 COMPREHEN METABOLIC PANEL: CPT

## 2018-10-04 PROCEDURE — 83735 ASSAY OF MAGNESIUM: CPT

## 2018-10-04 PROCEDURE — 81003 URINALYSIS AUTO W/O SCOPE: CPT

## 2018-10-04 PROCEDURE — 84100 ASSAY OF PHOSPHORUS: CPT

## 2018-10-05 ENCOUNTER — HOSPITAL ENCOUNTER (OUTPATIENT)
Dept: LAB | Facility: MEDICAL CENTER | Age: 78
End: 2018-10-05
Attending: UROLOGY
Payer: MEDICARE

## 2018-10-05 LAB — PSA SERPL-MCNC: 1.17 NG/ML (ref 0–4)

## 2018-10-05 PROCEDURE — 84153 ASSAY OF PSA TOTAL: CPT

## 2018-10-05 PROCEDURE — 36415 COLL VENOUS BLD VENIPUNCTURE: CPT

## 2018-12-12 ENCOUNTER — TELEPHONE (OUTPATIENT)
Dept: MEDICAL GROUP | Facility: LAB | Age: 78
End: 2018-12-12

## 2018-12-12 DIAGNOSIS — D50.9 IRON DEFICIENCY ANEMIA, UNSPECIFIED IRON DEFICIENCY ANEMIA TYPE: ICD-10-CM

## 2018-12-12 DIAGNOSIS — N28.9 RENAL INSUFFICIENCY: ICD-10-CM

## 2018-12-12 NOTE — TELEPHONE ENCOUNTER
1. Caller Name: Sukumar Marsh)                                         Call Back Number: [phone]      Patient approves a detailed voicemail message: yes    Ez has an appt 12/20/2018.  He is wondering if he needs to do labs prior.

## 2018-12-13 ENCOUNTER — HOSPITAL ENCOUNTER (OUTPATIENT)
Dept: LAB | Facility: MEDICAL CENTER | Age: 78
End: 2018-12-13
Attending: INTERNAL MEDICINE
Payer: MEDICARE

## 2018-12-13 DIAGNOSIS — D50.9 IRON DEFICIENCY ANEMIA, UNSPECIFIED IRON DEFICIENCY ANEMIA TYPE: ICD-10-CM

## 2018-12-13 DIAGNOSIS — N28.9 RENAL INSUFFICIENCY: ICD-10-CM

## 2018-12-13 LAB
ALBUMIN SERPL BCP-MCNC: 3.9 G/DL (ref 3.2–4.9)
ALBUMIN/GLOB SERPL: 2.1 G/DL
ALP SERPL-CCNC: 59 U/L (ref 30–99)
ALT SERPL-CCNC: 11 U/L (ref 2–50)
ANION GAP SERPL CALC-SCNC: 6 MMOL/L (ref 0–11.9)
AST SERPL-CCNC: 16 U/L (ref 12–45)
BASOPHILS # BLD AUTO: 0.6 % (ref 0–1.8)
BASOPHILS # BLD: 0.03 K/UL (ref 0–0.12)
BILIRUB SERPL-MCNC: 0.6 MG/DL (ref 0.1–1.5)
BUN SERPL-MCNC: 24 MG/DL (ref 8–22)
CALCIUM SERPL-MCNC: 8.9 MG/DL (ref 8.5–10.5)
CHLORIDE SERPL-SCNC: 108 MMOL/L (ref 96–112)
CO2 SERPL-SCNC: 23 MMOL/L (ref 20–33)
CREAT SERPL-MCNC: 1.5 MG/DL (ref 0.5–1.4)
EOSINOPHIL # BLD AUTO: 0.37 K/UL (ref 0–0.51)
EOSINOPHIL NFR BLD: 7.3 % (ref 0–6.9)
ERYTHROCYTE [DISTWIDTH] IN BLOOD BY AUTOMATED COUNT: 50.3 FL (ref 35.9–50)
GLOBULIN SER CALC-MCNC: 1.9 G/DL (ref 1.9–3.5)
GLUCOSE SERPL-MCNC: 185 MG/DL (ref 65–99)
HCT VFR BLD AUTO: 38 % (ref 42–52)
HGB BLD-MCNC: 12.1 G/DL (ref 14–18)
IMM GRANULOCYTES # BLD AUTO: 0.01 K/UL (ref 0–0.11)
IMM GRANULOCYTES NFR BLD AUTO: 0.2 % (ref 0–0.9)
IRON SATN MFR SERPL: 29 % (ref 15–55)
IRON SERPL-MCNC: 80 UG/DL (ref 50–180)
LYMPHOCYTES # BLD AUTO: 0.77 K/UL (ref 1–4.8)
LYMPHOCYTES NFR BLD: 15.1 % (ref 22–41)
MCH RBC QN AUTO: 31.4 PG (ref 27–33)
MCHC RBC AUTO-ENTMCNC: 31.8 G/DL (ref 33.7–35.3)
MCV RBC AUTO: 98.7 FL (ref 81.4–97.8)
MONOCYTES # BLD AUTO: 0.41 K/UL (ref 0–0.85)
MONOCYTES NFR BLD AUTO: 8 % (ref 0–13.4)
NEUTROPHILS # BLD AUTO: 3.51 K/UL (ref 1.82–7.42)
NEUTROPHILS NFR BLD: 68.8 % (ref 44–72)
NRBC # BLD AUTO: 0 K/UL
NRBC BLD-RTO: 0 /100 WBC
PLATELET # BLD AUTO: 109 K/UL (ref 164–446)
PMV BLD AUTO: 12.1 FL (ref 9–12.9)
POTASSIUM SERPL-SCNC: 4.1 MMOL/L (ref 3.6–5.5)
PROT SERPL-MCNC: 5.8 G/DL (ref 6–8.2)
RBC # BLD AUTO: 3.85 M/UL (ref 4.7–6.1)
SODIUM SERPL-SCNC: 137 MMOL/L (ref 135–145)
TIBC SERPL-MCNC: 274 UG/DL (ref 250–450)
WBC # BLD AUTO: 5.1 K/UL (ref 4.8–10.8)

## 2018-12-13 PROCEDURE — 80053 COMPREHEN METABOLIC PANEL: CPT

## 2018-12-13 PROCEDURE — 83540 ASSAY OF IRON: CPT

## 2018-12-13 PROCEDURE — 36415 COLL VENOUS BLD VENIPUNCTURE: CPT

## 2018-12-13 PROCEDURE — 85025 COMPLETE CBC W/AUTO DIFF WBC: CPT

## 2018-12-13 PROCEDURE — 83550 IRON BINDING TEST: CPT

## 2018-12-20 ENCOUNTER — OFFICE VISIT (OUTPATIENT)
Dept: MEDICAL GROUP | Facility: LAB | Age: 78
End: 2018-12-20
Payer: MEDICARE

## 2018-12-20 VITALS
OXYGEN SATURATION: 97 % | HEART RATE: 67 BPM | DIASTOLIC BLOOD PRESSURE: 65 MMHG | HEIGHT: 73 IN | WEIGHT: 278 LBS | RESPIRATION RATE: 14 BRPM | SYSTOLIC BLOOD PRESSURE: 120 MMHG | TEMPERATURE: 97.5 F | BODY MASS INDEX: 36.84 KG/M2

## 2018-12-20 DIAGNOSIS — E78.5 DYSLIPIDEMIA: Chronic | ICD-10-CM

## 2018-12-20 DIAGNOSIS — E11.22 TYPE 2 DIABETES MELLITUS WITH DIABETIC CHRONIC KIDNEY DISEASE, UNSPECIFIED CKD STAGE, UNSPECIFIED WHETHER LONG TERM INSULIN USE (HCC): Chronic | ICD-10-CM

## 2018-12-20 DIAGNOSIS — N28.9 RENAL INSUFFICIENCY: ICD-10-CM

## 2018-12-20 PROCEDURE — 99214 OFFICE O/P EST MOD 30 MIN: CPT | Performed by: INTERNAL MEDICINE

## 2018-12-20 NOTE — PROGRESS NOTES
CC: Sukumar Quiñonez is a 78 y.o. male is suffering from   Chief Complaint   Patient presents with   • Follow-Up     3 months         SUBJECTIVE:  1. Type 2 diabetes mellitus with diabetic chronic kidney disease, unspecified CKD stage, unspecified whether long term insulin use (HCC)  Ez is here for follow-up has a history of type 2 diabetes with relatively good control.    2. Dyslipidemia  Patient with dyslipidemia also with decent control is to continue on atorvastatin    3. Renal insufficiency  Patient with renal insufficiency is being followed by nephrology        Past social, family, history:   Social History   Substance Use Topics   • Smoking status: Never Smoker   • Smokeless tobacco: Never Used   • Alcohol use No         MEDICATIONS:    Current Outpatient Prescriptions:   •  liraglutide (VICTOZA) 18 MG/3ML Solution Pen-injector injection, Inject 0.6 mg as instructed every day., Disp: , Rfl:   •  atorvastatin (LIPITOR) 10 MG Tab, Take 10 mg by mouth every evening., Disp: , Rfl:   •  doxazosin (CARDURA) 2 MG Tab, Take 2 mg by mouth every day., Disp: , Rfl:   •  furosemide (LASIX) 40 MG Tab, TAKE ONE TABLET BY MOUTH ONCE DAILY, Disp: 90 Tab, Rfl: 3  •  benazepril (LOTENSIN) 10 MG Tab, TAKE ONE TABLET BY MOUTH ONCE DAILY, Disp: 90 Tab, Rfl: 3  •  LANTUS 100 UNIT/ML Solution, INJECT 10 UNITS SUBCUTANEOUSLY ONCE DAILY AT BEDTIME AS DIRECTED (Patient taking differently: 8 units qday), Disp: 10 mL, Rfl: 11  •  calcitRIOL (ROCALTROL) 0.25 MCG Cap, Take 0.25 mcg by mouth every 48 hours., Disp: , Rfl:   •  vitamin D (CHOLECALCIFEROL) 1000 UNIT Tab, Take 2,000 Units by mouth every day., Disp: , Rfl:   •  ascorbic acid (ASCORBIC ACID) 500 MG Tab, Take 500 mg by mouth every day., Disp: , Rfl:   •  KLOR-CON M20 20 MEQ Tab CR, TAKE ONE TABLET BY MOUTH TWICE DAILY, Disp: 180 Tab, Rfl: 3  •  ranitidine (ZANTAC) 150 MG Tab, TAKE ONE TABLET BY MOUTH TWICE DAILY, Disp: 180 Tab, Rfl: 3  •  imatinib (GLEEVEC) 100 MG  "TABS, Take 300 mg by mouth every day., Disp: , Rfl:   •  Kristie, Zingiber officinalis, (KRISTIE PO), Take  by mouth., Disp: , Rfl:     PROBLEMS:  Patient Active Problem List    Diagnosis Date Noted   • Postoperative pain 06/14/2018   • Acute medial meniscus tear of left knee 10/24/2017   • Wound of left lower extremity 08/15/2017   • Wound infection 08/07/2017   • Hypoglycemia 05/26/2017   • Elevated TSH 05/26/2017   • Type 2 diabetes mellitus (HCC) 05/26/2017   • Morbid obesity with BMI of 40.0-44.9, adult ([facility]) 05/08/2017   • Type II diabetes mellitus, well controlled ([facility]) 01/18/2017   • HTN (hypertension) 01/27/2012   • Dyslipidemia 01/27/2012   • Aortic valve insufficiency 08/25/2011   • CML (chronic myelocytic leukemia) ([facility]) 08/25/2011       REVIEW OF SYSTEMS:  Gen.:  No Nausea, Vomiting, fever, Chills.  Heart: No chest pain.  Lungs:  No shortness of Breath.  Psychological: Carlos unusual Anxiety depression     PHYSICAL EXAM   Constitutional: Alert, cooperative, not in acute distress.  Cardiovascular:  Rate Rhythm is regular without murmurs rubs clicks.     Thorax & Lungs: Clear to auscultation, no wheezing, rhonchi, or rales  HENT: Normocephalic, Atraumatic.  Eyes: PERRLA, EOMI, Conjunctiva normal.   Neck: Trachia is midline no swelling of the thyroid.   Neurologic: Alert & oriented x 3, cranial nerves II through XII are intact, Normal motor function, Normal sensory function, No focal deficits noted.   Psychiatric: Affect normal, Judgment normal, Mood normal.     VITAL SIGNS:/65   Pulse 67   Temp 36.4 °C (97.5 °F) (Temporal)   Resp 14   Ht 1.854 m (6' 1\")   Wt (!) 126.1 kg (278 lb)   SpO2 97%   BMI 36.68 kg/m²     Labs: Reviewed    Assessment:                                                     Plan:    1. Type 2 diabetes mellitus with diabetic chronic kidney disease, unspecified CKD stage, unspecified whether long term insulin use (HCC)  Type 2 diabetes with reasonable control no change in " medical therapy  - COMP METABOLIC PANEL; Future  - HEMOGLOBIN A1C; Future  - CBC WITH DIFFERENTIAL; Future    2. Dyslipidemia  Dyslipidemia clinically stable    3. Renal insufficiency  Renal insufficiency recheck labs in 6 months      Please note the patient is being followed by the [facility]

## 2018-12-30 ENCOUNTER — OFFICE VISIT (OUTPATIENT)
Dept: URGENT CARE | Facility: PHYSICIAN GROUP | Age: 78
End: 2018-12-30
Payer: MEDICARE

## 2018-12-30 VITALS
SYSTOLIC BLOOD PRESSURE: 104 MMHG | BODY MASS INDEX: 36.84 KG/M2 | HEART RATE: 74 BPM | TEMPERATURE: 98.2 F | OXYGEN SATURATION: 93 % | DIASTOLIC BLOOD PRESSURE: 60 MMHG | RESPIRATION RATE: 18 BRPM | HEIGHT: 73 IN | WEIGHT: 278 LBS

## 2018-12-30 DIAGNOSIS — R68.89 FLU-LIKE SYMPTOMS: ICD-10-CM

## 2018-12-30 DIAGNOSIS — J06.9 VIRAL URI WITH COUGH: ICD-10-CM

## 2018-12-30 LAB
FLUAV+FLUBV AG SPEC QL IA: NEGATIVE
INT CON NEG: NEGATIVE
INT CON POS: POSITIVE

## 2018-12-30 PROCEDURE — 99214 OFFICE O/P EST MOD 30 MIN: CPT | Performed by: NURSE PRACTITIONER

## 2018-12-30 PROCEDURE — 87804 INFLUENZA ASSAY W/OPTIC: CPT | Performed by: NURSE PRACTITIONER

## 2018-12-30 ASSESSMENT — COPD QUESTIONNAIRES: COPD: 0

## 2018-12-30 ASSESSMENT — ENCOUNTER SYMPTOMS
RHINORRHEA: 1
SHORTNESS OF BREATH: 0
FEVER: 1
MYALGIAS: 1
COUGH: 1
WHEEZING: 0

## 2018-12-30 NOTE — PROGRESS NOTES
"Subjective:      Sukumar Quiñonez is a 78 y.o. male who presents with Cough (Headache, congestion 1 day)            Cough   This is a new problem. The current episode started yesterday. The problem has been unchanged. The cough is non-productive. Associated symptoms include a fever (subjective), myalgias, nasal congestion and rhinorrhea. Pertinent negatives include no shortness of breath or wheezing. He has tried rest (OTC mucinex) for the symptoms. The treatment provided no relief. There is no history of asthma, COPD or pneumonia.       Review of Systems   Constitutional: Positive for fever (subjective).   HENT: Positive for congestion and rhinorrhea.    Respiratory: Positive for cough. Negative for shortness of breath and wheezing.    Musculoskeletal: Positive for myalgias.   All other systems reviewed and are negative.    Past Medical History:   Diagnosis Date   • Cancer (HCC)     Prostate; Chronic myleloid lukemia   • Cataract     OU   • CML (chronic myelocytic leukemia) ([facility]) 2011   • Diabetes 2005    TYPE 2 INSULIN AT hs   • Heart burn    • Heart valve disease    • Hemorrhoid    • High cholesterol     NOT TAKING STATIN NOW   • Hyperlipidemia    • Hypertension    • Indigestion    • Murmur, heart     dr. Vizcarra   • Renal disorder     kidney lab are getting worse\" sees dr hollins- borderline   • Type II diabetes mellitus, well controlled ([facility]) 1/18/2017   • Unspecified urinary incontinence     bladder stimulator   • Urinary bladder disorder       Past Surgical History:   Procedure Laterality Date   • KNEE ARTHROSCOPY Right 6/14/2018    Procedure: KNEE ARTHROSCOPY;  Surgeon: Raul Bobo M.D.;  Location: SURGERY [facility];  Service: Orthopedics   • MEDIAL MENISCECTOMY Right 6/14/2018    Procedure: MEDIAL MENISCECTOMY - PARTIAL an partial lateral menisectomy;  Surgeon: Raul Bobo M.D.;  Location: SURGERY [facility];  Service: Orthopedics   • CHONDROPLASTY Right 6/14/2018    Procedure: " CHONDROPLASTY - SAEZ;  Surgeon: Raul Bobo M.D.;  Location: SURGERY [facility];  Service: Orthopedics   • KNEE ARTHROSCOPY Left 10/24/2017    Procedure: KNEE ARTHROSCOPY;  Surgeon: Raul Bobo M.D.;  Location: SURGERY [facility];  Service: Orthopedics   • MEDIAL MENISCECTOMY Left 10/24/2017    Procedure: MEDIAL MENISCECTOMY - PARTIAL;  Surgeon: Raul oBbo M.D.;  Location: SURGERY [facility];  Service: Orthopedics   • WIDE EXCISION  8/12/2014    Performed by Veronica Ruth M.D. at SURGERY SAME DAY [facility] ORS   • MARCY  1/30/2012    Performed by TREY TAPIA at ENDOSCOPY [facility] ORS   • COLON RESECTION LAPAROSCOPIC  8/30/2011    Performed by PRISCILLA ROGERS at SURGERY [facility] ORS   • CYSTOSCOPY  4/7/2011    Performed by MIGUEL NEVILLE at SURGERY [facility] ORS   • SPHINCTER PROSTHESIS PLACEMENT  4/7/2011    Performed by MIGUEL NEVILLE at SURGERY [facility] ORS   • CYSTOSCOPY  1/25/2010    Performed by MIGUEL NEVILLE at SURGERY [facility] ORS   • TRANS URETHRAL RESECTION PROSTATE  1/25/2010    Performed by MIGUEL NEVILLE at SURGERY [facility] ORS   • CIRCUMCISION ADULT  8/17/2009    Performed by MIGUEL NEVILLE at SURGERY [facility] ORS   • TRANS URETHRAL RESECTION PROSTATE  8/17/2009    Performed by MIGUEL NEVILLE at SURGERY [facility]   • VEIN STRIPPING  2007   • BRACHY THERAPY  06   • UMBILICAL HERNIA REPAIR  2005      Social History     Social History   • Marital status:      Spouse name: N/A   • Number of children: N/A   • Years of education: N/A     Occupational History   • Not on file.     Social History Main Topics   • Smoking status: Never Smoker   • Smokeless tobacco: Never Used   • Alcohol use No   • Drug use: No   • Sexual activity: Yes     Partners: Female     Other Topics Concern   • Not on file     Social History Narrative   • No narrative on file          Objective:     /60 (BP Location: Left arm, Patient Position: Sitting, BP Cuff Size: Large  "adult)   Pulse 74   Temp 36.8 °C (98.2 °F) (Temporal)   Resp 18   Ht 1.854 m (6' 1\")   Wt (!) 126.1 kg (278 lb)   SpO2 93%   BMI 36.68 kg/m²      Physical Exam   Constitutional: He is oriented to person, place, and time. Vital signs are normal. He appears well-developed and well-nourished.   HENT:   Head: Normocephalic and atraumatic.   Right Ear: Tympanic membrane and external ear normal.   Left Ear: Tympanic membrane and external ear normal.   Nose: Mucosal edema and rhinorrhea present.   Mouth/Throat: Oropharynx is clear and moist.   Eyes: Pupils are equal, round, and reactive to light. EOM are normal.   Neck: Normal range of motion.   Cardiovascular: Normal rate and regular rhythm.    Murmur heard.  Pulmonary/Chest: Effort normal and breath sounds normal.   Abdominal: Normal appearance.   Musculoskeletal: Normal range of motion.   Lymphadenopathy:        Head (right side): Submandibular adenopathy present.        Head (left side): Submandibular adenopathy present.   Neurological: He is alert and oriented to person, place, and time.   Skin: Skin is warm and dry. Capillary refill takes less than 2 seconds.   Psychiatric: He has a normal mood and affect. His speech is normal and behavior is normal. Thought content normal.   Vitals reviewed.              Assessment/Plan:     1. Flu-like symptoms  - POCT Influenza A/B NEGATIVE    2. Viral URI with cough    Discussed with patient symptoms are viral in nature, there is low concern for any respiratory infection currently. Antibiotics are not advised at this time.  Encouraged rest and supportive care  OTC dayquil and nyquil as directed for cough and congestion  Increase water intake  RTC in 5 days if no improvement  Supportive care, differential diagnoses, and indications for immediate follow-up discussed with patient.    Pathogenesis of diagnosis discussed including typical length and natural progression.      Instructed to return to  or nearest emergency " department if symptoms fail to improve, for any change in condition, further concerns, or new concerning symptoms.  Patient states understanding of the plan of care and discharge instructions.

## 2019-01-09 ENCOUNTER — HOSPITAL ENCOUNTER (OUTPATIENT)
Dept: RADIOLOGY | Facility: MEDICAL CENTER | Age: 79
End: 2019-01-09
Attending: INTERNAL MEDICINE
Payer: MEDICARE

## 2019-01-09 ENCOUNTER — HOSPITAL ENCOUNTER (OUTPATIENT)
Dept: CARDIOLOGY | Facility: MEDICAL CENTER | Age: 79
End: 2019-01-09
Attending: INTERNAL MEDICINE
Payer: MEDICARE

## 2019-01-09 DIAGNOSIS — R91.1 COIN LESION OF LUNG: ICD-10-CM

## 2019-01-09 DIAGNOSIS — I71.20 THORACIC AORTIC ANEURYSM WITHOUT RUPTURE (HCC): ICD-10-CM

## 2019-01-09 LAB
LV EJECT FRACT MOD 2C 99903: 46.56
LV EJECT FRACT MOD 4C 99902: 50.46
LV EJECT FRACT MOD BP 99901: 49.22

## 2019-01-09 PROCEDURE — 93306 TTE W/DOPPLER COMPLETE: CPT

## 2019-01-09 PROCEDURE — 71250 CT THORAX DX C-: CPT

## 2019-01-21 ENCOUNTER — HOSPITAL ENCOUNTER (OUTPATIENT)
Dept: LAB | Facility: MEDICAL CENTER | Age: 79
End: 2019-01-21
Attending: INTERNAL MEDICINE
Payer: MEDICARE

## 2019-01-21 LAB
ALBUMIN SERPL BCP-MCNC: 4.1 G/DL (ref 3.2–4.9)
ALBUMIN/GLOB SERPL: 2.1 G/DL
ALP SERPL-CCNC: 50 U/L (ref 30–99)
ALT SERPL-CCNC: 12 U/L (ref 2–50)
ANION GAP SERPL CALC-SCNC: 5 MMOL/L (ref 0–11.9)
AST SERPL-CCNC: 17 U/L (ref 12–45)
BILIRUB SERPL-MCNC: 0.6 MG/DL (ref 0.1–1.5)
BUN SERPL-MCNC: 29 MG/DL (ref 8–22)
CALCIUM SERPL-MCNC: 9.4 MG/DL (ref 8.5–10.5)
CHLORIDE SERPL-SCNC: 108 MMOL/L (ref 96–112)
CHOLEST SERPL-MCNC: 127 MG/DL (ref 100–199)
CO2 SERPL-SCNC: 24 MMOL/L (ref 20–33)
CREAT SERPL-MCNC: 1.72 MG/DL (ref 0.5–1.4)
FASTING STATUS PATIENT QL REPORTED: NORMAL
GLOBULIN SER CALC-MCNC: 2 G/DL (ref 1.9–3.5)
GLUCOSE SERPL-MCNC: 159 MG/DL (ref 65–99)
HDLC SERPL-MCNC: 37 MG/DL
LDLC SERPL CALC-MCNC: 72 MG/DL
POTASSIUM SERPL-SCNC: 4.3 MMOL/L (ref 3.6–5.5)
PROT SERPL-MCNC: 6.1 G/DL (ref 6–8.2)
SODIUM SERPL-SCNC: 137 MMOL/L (ref 135–145)
TRIGL SERPL-MCNC: 92 MG/DL (ref 0–149)

## 2019-01-21 PROCEDURE — 80061 LIPID PANEL: CPT

## 2019-01-21 PROCEDURE — 83036 HEMOGLOBIN GLYCOSYLATED A1C: CPT

## 2019-01-21 PROCEDURE — 36415 COLL VENOUS BLD VENIPUNCTURE: CPT

## 2019-01-21 PROCEDURE — 80053 COMPREHEN METABOLIC PANEL: CPT

## 2019-01-22 LAB
EST. AVERAGE GLUCOSE BLD GHB EST-MCNC: 166 MG/DL
HBA1C MFR BLD: 7.4 % (ref 0–5.6)

## 2019-03-28 ENCOUNTER — HOSPITAL ENCOUNTER (OUTPATIENT)
Dept: LAB | Facility: MEDICAL CENTER | Age: 79
End: 2019-03-28
Attending: INTERNAL MEDICINE
Payer: MEDICARE

## 2019-03-28 ENCOUNTER — HOSPITAL ENCOUNTER (OUTPATIENT)
Dept: LAB | Facility: MEDICAL CENTER | Age: 79
End: 2019-03-28
Attending: NURSE PRACTITIONER
Payer: MEDICARE

## 2019-03-28 LAB
ALBUMIN SERPL BCP-MCNC: 3.8 G/DL (ref 3.2–4.9)
ALBUMIN/GLOB SERPL: 1.8 G/DL
ALP SERPL-CCNC: 61 U/L (ref 30–99)
ALT SERPL-CCNC: 13 U/L (ref 2–50)
ANION GAP SERPL CALC-SCNC: 6 MMOL/L (ref 0–11.9)
APPEARANCE UR: CLEAR
AST SERPL-CCNC: 18 U/L (ref 12–45)
BASOPHILS # BLD AUTO: 0.3 % (ref 0–1.8)
BASOPHILS # BLD: 0.02 K/UL (ref 0–0.12)
BILIRUB SERPL-MCNC: 0.6 MG/DL (ref 0.1–1.5)
BILIRUB UR QL STRIP.AUTO: NEGATIVE
BUN SERPL-MCNC: 35 MG/DL (ref 8–22)
CALCIUM SERPL-MCNC: 8.5 MG/DL (ref 8.5–10.5)
CHLORIDE SERPL-SCNC: 110 MMOL/L (ref 96–112)
CO2 SERPL-SCNC: 23 MMOL/L (ref 20–33)
COLOR UR: YELLOW
CREAT SERPL-MCNC: 1.55 MG/DL (ref 0.5–1.4)
CREAT UR-MCNC: 164.8 MG/DL
EOSINOPHIL # BLD AUTO: 0.2 K/UL (ref 0–0.51)
EOSINOPHIL NFR BLD: 3.3 % (ref 0–6.9)
ERYTHROCYTE [DISTWIDTH] IN BLOOD BY AUTOMATED COUNT: 50.7 FL (ref 35.9–50)
GLOBULIN SER CALC-MCNC: 2.1 G/DL (ref 1.9–3.5)
GLUCOSE SERPL-MCNC: 136 MG/DL (ref 65–99)
GLUCOSE UR STRIP.AUTO-MCNC: NEGATIVE MG/DL
HCT VFR BLD AUTO: 37.1 % (ref 42–52)
HGB BLD-MCNC: 12 G/DL (ref 14–18)
IMM GRANULOCYTES # BLD AUTO: 0.01 K/UL (ref 0–0.11)
IMM GRANULOCYTES NFR BLD AUTO: 0.2 % (ref 0–0.9)
KETONES UR STRIP.AUTO-MCNC: ABNORMAL MG/DL
LEUKOCYTE ESTERASE UR QL STRIP.AUTO: NEGATIVE
LYMPHOCYTES # BLD AUTO: 0.76 K/UL (ref 1–4.8)
LYMPHOCYTES NFR BLD: 12.4 % (ref 22–41)
MAGNESIUM SERPL-MCNC: 1.9 MG/DL (ref 1.5–2.5)
MCH RBC QN AUTO: 32.5 PG (ref 27–33)
MCHC RBC AUTO-ENTMCNC: 32.3 G/DL (ref 33.7–35.3)
MCV RBC AUTO: 100.5 FL (ref 81.4–97.8)
MICRO URNS: ABNORMAL
MONOCYTES # BLD AUTO: 0.49 K/UL (ref 0–0.85)
MONOCYTES NFR BLD AUTO: 8 % (ref 0–13.4)
NEUTROPHILS # BLD AUTO: 4.64 K/UL (ref 1.82–7.42)
NEUTROPHILS NFR BLD: 75.8 % (ref 44–72)
NITRITE UR QL STRIP.AUTO: NEGATIVE
NRBC # BLD AUTO: 0 K/UL
NRBC BLD-RTO: 0 /100 WBC
PH UR STRIP.AUTO: 5.5 [PH]
PHOSPHATE SERPL-MCNC: 3.1 MG/DL (ref 2.5–4.5)
PLATELET # BLD AUTO: 131 K/UL (ref 164–446)
PMV BLD AUTO: 11.7 FL (ref 9–12.9)
POTASSIUM SERPL-SCNC: 4.3 MMOL/L (ref 3.6–5.5)
PROT SERPL-MCNC: 5.9 G/DL (ref 6–8.2)
PROT UR QL STRIP: NEGATIVE MG/DL
PROT UR-MCNC: 14.8 MG/DL (ref 0–15)
PROT UR-MCNC: 15.3 MG/DL (ref 0–15)
PROT/CREAT UR: 93 MG/G (ref 15–68)
RBC # BLD AUTO: 3.69 M/UL (ref 4.7–6.1)
RBC UR QL AUTO: NEGATIVE
SODIUM SERPL-SCNC: 139 MMOL/L (ref 135–145)
SP GR UR STRIP.AUTO: 1.02
URATE SERPL-MCNC: 6.9 MG/DL (ref 2.5–8.3)
UROBILINOGEN UR STRIP.AUTO-MCNC: 1 MG/DL
WBC # BLD AUTO: 6.1 K/UL (ref 4.8–10.8)

## 2019-03-28 PROCEDURE — 85025 COMPLETE CBC W/AUTO DIFF WBC: CPT

## 2019-03-28 PROCEDURE — 84550 ASSAY OF BLOOD/URIC ACID: CPT

## 2019-03-28 PROCEDURE — 84156 ASSAY OF PROTEIN URINE: CPT

## 2019-03-28 PROCEDURE — 84100 ASSAY OF PHOSPHORUS: CPT

## 2019-03-28 PROCEDURE — 81206 BCR/ABL1 GENE MAJOR BP: CPT

## 2019-03-28 PROCEDURE — 82570 ASSAY OF URINE CREATININE: CPT

## 2019-03-28 PROCEDURE — 83735 ASSAY OF MAGNESIUM: CPT

## 2019-03-28 PROCEDURE — 83036 HEMOGLOBIN GLYCOSYLATED A1C: CPT

## 2019-03-28 PROCEDURE — 81003 URINALYSIS AUTO W/O SCOPE: CPT

## 2019-03-28 PROCEDURE — 36415 COLL VENOUS BLD VENIPUNCTURE: CPT

## 2019-03-28 PROCEDURE — 80053 COMPREHEN METABOLIC PANEL: CPT

## 2019-03-29 LAB
EST. AVERAGE GLUCOSE BLD GHB EST-MCNC: 137 MG/DL
HBA1C MFR BLD: 6.4 % (ref 0–5.6)

## 2019-04-02 LAB
PATHOLOGY STUDY: NORMAL
SPECIMEN SOURCE: NORMAL
T(ABL1,BCR)P210 BLD/T QL: NORMAL
T(ABL1,BCR)P210/CONTROL (IS) BLD/T: NORMAL %
T(ABL1,BCR)P210/CONTROL BLD/T: NORMAL %

## 2019-06-06 ENCOUNTER — TELEPHONE (OUTPATIENT)
Dept: MEDICAL GROUP | Facility: LAB | Age: 79
End: 2019-06-06

## 2019-06-06 NOTE — TELEPHONE ENCOUNTER
Phone Number Called: My Chart    Call outcome: My chart message sent    Message: Sent My chart message to patient requesting them to call and schedule AWV

## 2019-06-11 ENCOUNTER — TELEPHONE (OUTPATIENT)
Dept: MEDICAL GROUP | Facility: LAB | Age: 79
End: 2019-06-11

## 2019-06-11 NOTE — TELEPHONE ENCOUNTER
ESTABLISHED PATIENT PRE-VISIT PLANNING     Patient was NOT contacted to complete PVP.     Note: Patient will not be contacted if there is no indication to call.     1.  Reviewed notes from the last few office visits within the medical group: Yes    2.  If any orders were placed at last visit or intended to be done for this visit (i.e. 6 mos follow-up), do we have Results/Consult Notes?        •  Labs - Labs ordered, NOT completed. Patient advised to complete prior to next appointment. Patient had same labs drawn which were ordered on 3/28/19 by Ramona Bardales   Note: If patient appointment is for lab review and patient did not complete labs, check with provider if OK to reschedule patient until labs completed.       •  Imaging - Imaging was not ordered at last office visit.       •  Referrals - No referrals were ordered at last office visit.    3. Is this appointment scheduled as a Hospital Follow-Up? No    4.  Immunizations were updated in Epic using WebIZ?: Epic matches WebIZ       •  Web Iz Recommendations: TD and SHINGRIX (Shingles)    5.  Patient is due for the following Health Maintenance Topics:   Health Maintenance Due   Topic Date Due   • IMM HEP B VACCINE (1 of 3 - Risk 3-dose series) 10/30/1959   • IMM ZOSTER VACCINES (1 of 2) 10/30/1989   • COLONOSCOPY  12/09/2018   • DIABETES MONOFILAMENT / LE EXAM  03/07/2019   • RETINAL SCREENING  06/06/2019   • Annual Wellness Visit  06/07/2019       - Patient has completed FLU, PNEUMOVAX (PPSV23), PREVNAR (PCV13)  and TDAP Immunization(s) per WebIZ. Chart has been updated.    6. Orders for overdue Health Maintenance topics pended in Pre-Charting? N\A    7.  AHA (MDX) form printed for Provider? YES    8.  Patient was NOT informed to arrive 15 min prior to their scheduled appointment and bring in their medication bottles.

## 2019-06-19 ENCOUNTER — OFFICE VISIT (OUTPATIENT)
Dept: MEDICAL GROUP | Facility: LAB | Age: 79
End: 2019-06-19
Payer: MEDICARE

## 2019-06-19 VITALS
TEMPERATURE: 97.6 F | WEIGHT: 281 LBS | DIASTOLIC BLOOD PRESSURE: 60 MMHG | HEIGHT: 73 IN | SYSTOLIC BLOOD PRESSURE: 108 MMHG | OXYGEN SATURATION: 94 % | HEART RATE: 65 BPM | BODY MASS INDEX: 37.24 KG/M2 | RESPIRATION RATE: 12 BRPM

## 2019-06-19 DIAGNOSIS — E11.22 TYPE 2 DIABETES MELLITUS WITH DIABETIC CHRONIC KIDNEY DISEASE, UNSPECIFIED CKD STAGE, UNSPECIFIED WHETHER LONG TERM INSULIN USE (HCC): Chronic | ICD-10-CM

## 2019-06-19 DIAGNOSIS — C92.10 CML (CHRONIC MYELOCYTIC LEUKEMIA) (HCC): Chronic | ICD-10-CM

## 2019-06-19 DIAGNOSIS — E66.01 CLASS 2 SEVERE OBESITY DUE TO EXCESS CALORIES WITH SERIOUS COMORBIDITY AND BODY MASS INDEX (BMI) OF 37.0 TO 37.9 IN ADULT (HCC): ICD-10-CM

## 2019-06-19 DIAGNOSIS — D69.6 THROMBOCYTOPENIA, UNSPECIFIED (HCC): ICD-10-CM

## 2019-06-19 DIAGNOSIS — I83.93 ASYMPTOMATIC VARICOSE VEINS OF BOTH LOWER EXTREMITIES: ICD-10-CM

## 2019-06-19 PROBLEM — E66.9 CLASS 2 OBESITY WITH BODY MASS INDEX (BMI) OF 37.0 TO 37.9 IN ADULT: Status: ACTIVE | Noted: 2019-06-19

## 2019-06-19 PROCEDURE — 99214 OFFICE O/P EST MOD 30 MIN: CPT | Mod: 25 | Performed by: INTERNAL MEDICINE

## 2019-06-19 PROCEDURE — 8041 PR SCP AHA: Performed by: INTERNAL MEDICINE

## 2019-06-19 RX ORDER — PANTOPRAZOLE SODIUM 20 MG/1
20 TABLET, DELAYED RELEASE ORAL 2 TIMES DAILY
COMMUNITY

## 2019-06-19 RX ORDER — GLIMEPIRIDE 4 MG/1
4 TABLET ORAL EVERY MORNING
COMMUNITY

## 2019-06-19 ASSESSMENT — PATIENT HEALTH QUESTIONNAIRE - PHQ9: CLINICAL INTERPRETATION OF PHQ2 SCORE: 0

## 2019-06-19 NOTE — LETTER
[facility]  Edgardo Castano D.O.  [address]  [place] [address]  Fax: [phone]   Authorization for Release/Disclosure of   Protected Health Information   Name: SUKUMAR ARREOLA PAGE : 1940 SSN: [id number]   Address: [place] [address] Phone:    [phone] (home)    I authorize the entity listed below to release/disclose the PHI below to:   [facility]/Edgardo Castano D.O. and Edgardo Castano D.O.   Provider or Entity Name:  [place] [facility]   Address   City, State, Zip   Phone:      Fax:     Reason for request: continuity of care   Information to be released:    [  ] LAST COLONOSCOPY,  including any PATH REPORT and follow-up  [  ] LAST FIT/COLOGUARD RESULT [  ] LAST DEXA  [  ] LAST MAMMOGRAM  [  ] LAST PAP  [  ] LAST LABS [XX] RETINA EXAM REPORT  [  ] IMMUNIZATION RECORDS  [  ] Release all info      [  ] Check here and initial the line next to each item to release ALL health information INCLUDING  _____ Care and treatment for drug and / or alcohol abuse  _____ HIV testing, infection status, or AIDS  _____ Genetic Testing    DATES OF SERVICE OR TIME PERIOD TO BE DISCLOSED: _____________  I understand and acknowledge that:  * This Authorization may be revoked at any time by you in writing, except if your health information has already been used or disclosed.  * Your health information that will be used or disclosed as a result of you signing this authorization could be re-disclosed by the recipient. If this occurs, your re-disclosed health information may no longer be protected by State or Federal laws.  * You may refuse to sign this Authorization. Your refusal will not affect your ability to obtain treatment.  * This Authorization becomes effective upon signing and will  on (date) __________.      If no date is indicated, this Authorization will  one (1) year from the signature date.    Name: Sukumar Arreola Page    Signature:   Date:     2019            PLEASE FAX REQUESTED RECORDS BACK TO: [phone]

## 2019-06-19 NOTE — PROGRESS NOTES
Subjective:     Sukumar Quiñonez is a 78 y.o. male here today for type 2 diabetes and Annual Health Assessment.    Patient and I have discussed his diabetic control which is excellent, we have also discussed that his kidneys appear to be doing reasonably well at this time.    Health Maintenance Summary                IMM HEP B VACCINE Overdue 10/30/1958     IMM ZOSTER VACCINES Overdue 10/30/1989     COLONOSCOPY Overdue 12/9/2018      Done 12/9/2015 AMB REFERRAL TO GI FOR COLONOSCOPY     Patient has more history with this topic...    DIABETES MONOFILAMENT / LE EXAM Overdue 3/7/2019      Done 3/7/2018 AMB DIABETIC MONOFILAMENT LOWER EXTREMITY EXAM     Patient has more history with this topic...    RETINAL SCREENING Overdue 6/6/2019      Done 6/6/2018 POCT RETINAL EYE EXAM     Patient has more history with this topic...    Annual Wellness Visit Overdue 6/7/2019      Done 6/6/2018     A1C SCREENING Next Due 9/28/2019      Done 3/28/2019 HEMOGLOBIN A1C      Patient has more history with this topic...    FASTING LIPID PROFILE Next Due 1/21/2020      Done 1/21/2019 LIPID PROFILE      Patient has more history with this topic...    URINE ACR / MICROALBUMIN Next Due 3/28/2020      Done 3/28/2019 PROTEIN/CREAT RATIO URINE      Patient has more history with this topic...    SERUM CREATININE Next Due 3/28/2020      Done 3/28/2019 COMP METABOLIC PANEL      Patient has more history with this topic...    IMM DTaP/Tdap/Td Vaccine Next Due 7/2/2026      Done 7/2/2016 Imm Admin: Tdap Vaccine            Annual Health Assessment Questions:      1.  Are you currently engaging in any exercise or physical activity? Yes    2.  How would you describe your mood or emotional well-being today? good    3.  Have you had any falls in the last year? No    4.  Have you noticed any problems with your balance or had difficulty walking? No    5.  In the last six months have you experienced any leakage of urine? No    6. DPA/Advanced Directive: Patient  does not have an Advanced Directive.  A packet and workshop information was given on Advanced Directives.    Current medicines (including changes today)  Current Outpatient Prescriptions   Medication Sig Dispense Refill   • glimepiride (AMARYL) 4 MG Tab Take 4 mg by mouth every morning.     • pantoprazole (PROTONIX) 20 MG tablet Take 20 mg by mouth 2 times a day.     • liraglutide (VICTOZA) 18 MG/3ML Solution Pen-injector injection Inject 0.6 mg as instructed every day.     • atorvastatin (LIPITOR) 10 MG Tab Take 10 mg by mouth every evening.     • doxazosin (CARDURA) 2 MG Tab Take 2 mg by mouth every day.     • furosemide (LASIX) 40 MG Tab TAKE ONE TABLET BY MOUTH ONCE DAILY 90 Tab 3   • benazepril (LOTENSIN) 10 MG Tab TAKE ONE TABLET BY MOUTH ONCE DAILY 90 Tab 3   • calcitRIOL (ROCALTROL) 0.25 MCG Cap Take 0.25 mcg by mouth every 48 hours.     • vitamin D (CHOLECALCIFEROL) 1000 UNIT Tab Take 2,000 Units by mouth every day.     • ascorbic acid (ASCORBIC ACID) 500 MG Tab Take 500 mg by mouth every day.     • KLOR-CON M20 20 MEQ Tab CR TAKE ONE TABLET BY MOUTH TWICE DAILY 180 Tab 3   • ranitidine (ZANTAC) 150 MG Tab TAKE ONE TABLET BY MOUTH TWICE DAILY 180 Tab 3   • imatinib (GLEEVEC) 100 MG TABS Take 300 mg by mouth every day.     • LANTUS 100 UNIT/ML Solution INJECT 10 UNITS SUBCUTANEOUSLY ONCE DAILY AT BEDTIME AS DIRECTED (Patient taking differently: 8 units qday) 10 mL 11   • Kristie, Zingiber officinalis, (KRISTIE PO) Take  by mouth.       No current facility-administered medications for this visit.        He  has a past medical history of Cancer (HCC); Cataract; CML (chronic myelocytic leukemia) ([facility]) (2011); Diabetes (2005); Heart burn; Heart valve disease; Hemorrhoid; High cholesterol; Hyperlipidemia; Hypertension; Indigestion; Murmur, heart; Renal disorder; Type II diabetes mellitus, well controlled ([facility]) (1/18/2017); Unspecified urinary incontinence; and Urinary bladder disorder.    Nkda [no known drug  "allergy]    He  reports that he has never smoked. He has never used smokeless tobacco. He reports that he does not drink alcohol or use drugs.  Counseling given: Not Answered      ROS    No chest pain, no shortness of breath, no abdominal pain.     Objective:     Physical Exam:  /60   Pulse 65   Temp 36.4 °C (97.6 °F) (Temporal)   Resp 12   Ht 1.854 m (6' 1\")   Wt (!) 127.5 kg (281 lb)   SpO2 94%  Body mass index is 37.07 kg/m².    Constitutional: Alert, no distress.  Skin: Warm, dry, good turgor, no rashes in visible areas.  Eye: Equal, round and reactive, conjunctiva clear, lids normal.  ENMT: Lips without lesions, good dentition, oropharynx clear.  Neck: Trachea midline, no masses, no thyromegaly. No cervical or supraclavicular lymphadenopathy.  Respiratory: Unlabored respiratory effort, lungs clear to auscultation, no wheezes, no rhonchi.  Cardiovascular: Normal S1, S2, no murmur, no edema.  Abdomen: Soft, non-tender, no masses, no hepatosplenomegaly.  Psych: Alert and oriented x3, normal affect and mood.    Assessment and Plan:     1. CML (chronic myelocytic leukemia) ([facility])  Currently stable    2. Thrombocytopenia, unspecified ([facility])  Ongoing    3. Type 2 diabetes mellitus with diabetic chronic kidney disease, unspecified CKD stage, unspecified whether long term insulin use ([facility])  Recheck conference of metabolic panel CBC  - Comp Metabolic Panel; Future  - CBC WITH DIFFERENTIAL; Future    4. Class 2 severe obesity due to excess calories with serious comorbidity and body mass index (BMI) of 37.0 to 37.9 in adult ([facility])  Discussed the importance of weight loss    5. Asymptomatic varicose veins of both lower extremities  Healing, status post surgery      Discussion today about general wellness and lifestyle habits:    · Engage in regular physical activity and social activities.  · Prevent falls and reduce trip hazards; using ambulatory aides, hearing and vision testing if appropriate.  · Steps to improve " urinary incontinence.  · Advanced care planning.    Follow-Up: No Follow-up on file.         PLEASE NOTE: This dictation was created using voice recognition software. I have made every reasonable attempt to correct obvious errors, but I expect that there are errors of grammar and possibly content that I did not discover before finalizing the note.    CC: Sukumar Quiñonez is a 78 y.o. male is suffering from   Chief Complaint   Patient presents with   • Diabetes     6 months follow up         SUBJECTIVE:  1. CML (chronic myelocytic leukemia) ([facility])  Patient with a history of CML clinically stable, appears to be doing well    2. Thrombocytopenia, unspecified ([facility])  Lab work rechecked patient with ongoing issues with mild thrombocytopenia    3. Type 2 diabetes mellitus with diabetic chronic kidney disease, unspecified CKD stage, unspecified whether long term insulin use ([facility])  Very well controlled type 2 diabetes with mild renal insufficiency    4. Class 2 severe obesity due to excess calories with serious comorbidity and body mass index (BMI) of 37.0 to 37.9 in adult ([facility])  Encourage patient to continue to work on weight loss    5. Asymptomatic varicose veins of both lower extremities  Status post vein stripping with Dr. Valentina Martinez patient is to continue to use compression FLORY hose        Past social, family, history:   Social History   Substance Use Topics   • Smoking status: Never Smoker   • Smokeless tobacco: Never Used   • Alcohol use No         MEDICATIONS:    Current Outpatient Prescriptions:   •  glimepiride (AMARYL) 4 MG Tab, Take 4 mg by mouth every morning., Disp: , Rfl:   •  pantoprazole (PROTONIX) 20 MG tablet, Take 20 mg by mouth 2 times a day., Disp: , Rfl:   •  liraglutide (VICTOZA) 18 MG/3ML Solution Pen-injector injection, Inject 0.6 mg as instructed every day., Disp: , Rfl:   •  atorvastatin (LIPITOR) 10 MG Tab, Take 10 mg by mouth every evening., Disp: , Rfl:   •  doxazosin (CARDURA) 2 MG  Tab, Take 2 mg by mouth every day., Disp: , Rfl:   •  furosemide (LASIX) 40 MG Tab, TAKE ONE TABLET BY MOUTH ONCE DAILY, Disp: 90 Tab, Rfl: 3  •  benazepril (LOTENSIN) 10 MG Tab, TAKE ONE TABLET BY MOUTH ONCE DAILY, Disp: 90 Tab, Rfl: 3  •  calcitRIOL (ROCALTROL) 0.25 MCG Cap, Take 0.25 mcg by mouth every 48 hours., Disp: , Rfl:   •  vitamin D (CHOLECALCIFEROL) 1000 UNIT Tab, Take 2,000 Units by mouth every day., Disp: , Rfl:   •  ascorbic acid (ASCORBIC ACID) 500 MG Tab, Take 500 mg by mouth every day., Disp: , Rfl:   •  KLOR-CON M20 20 MEQ Tab CR, TAKE ONE TABLET BY MOUTH TWICE DAILY, Disp: 180 Tab, Rfl: 3  •  ranitidine (ZANTAC) 150 MG Tab, TAKE ONE TABLET BY MOUTH TWICE DAILY, Disp: 180 Tab, Rfl: 3  •  imatinib (GLEEVEC) 100 MG TABS, Take 300 mg by mouth every day., Disp: , Rfl:   •  LANTUS 100 UNIT/ML Solution, INJECT 10 UNITS SUBCUTANEOUSLY ONCE DAILY AT BEDTIME AS DIRECTED (Patient taking differently: 8 units qday), Disp: 10 mL, Rfl: 11  •  Kristie, Zingiber officinalis, (KRISTIE PO), Take  by mouth., Disp: , Rfl:     PROBLEMS:  Patient Active Problem List    Diagnosis Date Noted   • Class 2 obesity with body mass index (BMI) of 37.0 to 37.9 in adult 06/19/2019   • Postoperative pain 06/14/2018   • Acute medial meniscus tear of left knee 10/24/2017   • Wound of left lower extremity 08/15/2017   • Wound infection 08/07/2017   • Hypoglycemia 05/26/2017   • Elevated TSH 05/26/2017   • Type 2 diabetes mellitus (HCC) 05/26/2017   • Type II diabetes mellitus, well controlled (HCC) 01/18/2017   • HTN (hypertension) 01/27/2012   • Dyslipidemia 01/27/2012   • Aortic valve insufficiency 08/25/2011   • CML (chronic myelocytic leukemia) (HCC) 08/25/2011       REVIEW OF SYSTEMS:  Gen.:  No Nausea, Vomiting, fever, Chills.  Heart: No chest pain.  Lungs:  No shortness of Breath.  Psychological: Carlos unusual Anxiety depression     PHYSICAL EXAM   Constitutional: Alert, cooperative, not in acute distress.  Cardiovascular:   "Rate Rhythm is regular without murmurs rubs clicks.     Thorax & Lungs: Clear to auscultation, no wheezing, rhonchi, or rales  HENT: Normocephalic, Atraumatic.  Eyes: PERRLA, EOMI, Conjunctiva normal.   Neck: Trachia is midline no swelling of the thyroid.   Lymphatic: No lymphadenopathy noted.   Musculoskeletal: No evidence of ulceration lower extremities  Neurologic: Alert & oriented x 3, cranial nerves II through XII are intact, Normal motor function, Normal sensory function, No focal deficits noted.   Psychiatric: Affect normal, Judgment normal, Mood normal.     VITAL SIGNS:/60   Pulse 65   Temp 36.4 °C (97.6 °F) (Temporal)   Resp 12   Ht 1.854 m (6' 1\")   Wt (!) 127.5 kg (281 lb)   SpO2 94%   BMI 37.07 kg/m²     Labs: Reviewed    Assessment:                                                     Plan:    1. CML (chronic myelocytic leukemia) ([facility])  Ongoing controlled    2. Thrombocytopenia, unspecified ([facility])  Mild    3. Type 2 diabetes mellitus with diabetic chronic kidney disease, unspecified CKD stage, unspecified whether long term insulin use ([facility])  Recheck conference metabolic panel CBC well-controlled type 2 diabetes  - Comp Metabolic Panel; Future  - CBC WITH DIFFERENTIAL; Future    4. Class 2 severe obesity due to excess calories with serious comorbidity and body mass index (BMI) of 37.0 to 37.9 in adult ([facility])  Improving    5. Asymptomatic varicose veins of both lower extremities  No change in medical therapy patient is to continue with compression FLORY hose        "

## 2019-08-30 ENCOUNTER — HOSPITAL ENCOUNTER (OUTPATIENT)
Dept: LAB | Facility: MEDICAL CENTER | Age: 79
End: 2019-08-30
Attending: INTERNAL MEDICINE
Payer: MEDICARE

## 2019-08-30 DIAGNOSIS — E11.22 TYPE 2 DIABETES MELLITUS WITH DIABETIC CHRONIC KIDNEY DISEASE, UNSPECIFIED CKD STAGE, UNSPECIFIED WHETHER LONG TERM INSULIN USE (HCC): Chronic | ICD-10-CM

## 2019-08-30 LAB
ALBUMIN SERPL BCP-MCNC: 4 G/DL (ref 3.2–4.9)
ALBUMIN/GLOB SERPL: 1.7 G/DL
ALP SERPL-CCNC: 47 U/L (ref 30–99)
ALT SERPL-CCNC: 16 U/L (ref 2–50)
ANION GAP SERPL CALC-SCNC: 9 MMOL/L (ref 0–11.9)
AST SERPL-CCNC: 28 U/L (ref 12–45)
BASOPHILS # BLD AUTO: 0.4 % (ref 0–1.8)
BASOPHILS # BLD: 0.02 K/UL (ref 0–0.12)
BILIRUB SERPL-MCNC: 0.7 MG/DL (ref 0.1–1.5)
BUN SERPL-MCNC: 30 MG/DL (ref 8–22)
CALCIUM SERPL-MCNC: 8.5 MG/DL (ref 8.5–10.5)
CHLORIDE SERPL-SCNC: 109 MMOL/L (ref 96–112)
CO2 SERPL-SCNC: 23 MMOL/L (ref 20–33)
CREAT SERPL-MCNC: 1.84 MG/DL (ref 0.5–1.4)
EOSINOPHIL # BLD AUTO: 0.3 K/UL (ref 0–0.51)
EOSINOPHIL NFR BLD: 5.8 % (ref 0–6.9)
ERYTHROCYTE [DISTWIDTH] IN BLOOD BY AUTOMATED COUNT: 51.9 FL (ref 35.9–50)
EST. AVERAGE GLUCOSE BLD GHB EST-MCNC: 137 MG/DL
GLOBULIN SER CALC-MCNC: 2.3 G/DL (ref 1.9–3.5)
GLUCOSE SERPL-MCNC: 137 MG/DL (ref 65–99)
HBA1C MFR BLD: 6.4 % (ref 0–5.6)
HCT VFR BLD AUTO: 36.8 % (ref 42–52)
HGB BLD-MCNC: 11.7 G/DL (ref 14–18)
IMM GRANULOCYTES # BLD AUTO: 0.01 K/UL (ref 0–0.11)
IMM GRANULOCYTES NFR BLD AUTO: 0.2 % (ref 0–0.9)
LYMPHOCYTES # BLD AUTO: 0.88 K/UL (ref 1–4.8)
LYMPHOCYTES NFR BLD: 16.9 % (ref 22–41)
MCH RBC QN AUTO: 31.9 PG (ref 27–33)
MCHC RBC AUTO-ENTMCNC: 31.8 G/DL (ref 33.7–35.3)
MCV RBC AUTO: 100.3 FL (ref 81.4–97.8)
MONOCYTES # BLD AUTO: 0.54 K/UL (ref 0–0.85)
MONOCYTES NFR BLD AUTO: 10.4 % (ref 0–13.4)
NEUTROPHILS # BLD AUTO: 3.46 K/UL (ref 1.82–7.42)
NEUTROPHILS NFR BLD: 66.3 % (ref 44–72)
NRBC # BLD AUTO: 0 K/UL
NRBC BLD-RTO: 0 /100 WBC
PLATELET # BLD AUTO: 107 K/UL (ref 164–446)
PMV BLD AUTO: 12 FL (ref 9–12.9)
POTASSIUM SERPL-SCNC: 4.2 MMOL/L (ref 3.6–5.5)
PROT SERPL-MCNC: 6.3 G/DL (ref 6–8.2)
RBC # BLD AUTO: 3.67 M/UL (ref 4.7–6.1)
SODIUM SERPL-SCNC: 141 MMOL/L (ref 135–145)
WBC # BLD AUTO: 5.2 K/UL (ref 4.8–10.8)

## 2019-08-30 PROCEDURE — 80053 COMPREHEN METABOLIC PANEL: CPT

## 2019-08-30 PROCEDURE — 83036 HEMOGLOBIN GLYCOSYLATED A1C: CPT

## 2019-08-30 PROCEDURE — 36415 COLL VENOUS BLD VENIPUNCTURE: CPT

## 2019-08-30 PROCEDURE — 81206 BCR/ABL1 GENE MAJOR BP: CPT

## 2019-08-30 PROCEDURE — 85025 COMPLETE CBC W/AUTO DIFF WBC: CPT

## 2019-09-05 LAB
PATHOLOGY STUDY: ABNORMAL
SPECIMEN SOURCE: ABNORMAL
T(ABL1,BCR)P210 BLD/T QL: ABNORMAL
T(ABL1,BCR)P210/CONTROL (IS) BLD/T: ABNORMAL %

## 2019-09-17 ENCOUNTER — OFFICE VISIT (OUTPATIENT)
Dept: MEDICAL GROUP | Facility: LAB | Age: 79
End: 2019-09-17
Payer: MEDICARE

## 2019-09-17 VITALS
TEMPERATURE: 97.7 F | WEIGHT: 282.8 LBS | OXYGEN SATURATION: 93 % | RESPIRATION RATE: 14 BRPM | HEIGHT: 73 IN | HEART RATE: 70 BPM | SYSTOLIC BLOOD PRESSURE: 120 MMHG | DIASTOLIC BLOOD PRESSURE: 70 MMHG | BODY MASS INDEX: 37.48 KG/M2

## 2019-09-17 DIAGNOSIS — C92.10 CML (CHRONIC MYELOCYTIC LEUKEMIA) (HCC): ICD-10-CM

## 2019-09-17 DIAGNOSIS — Z23 NEED FOR VACCINATION: ICD-10-CM

## 2019-09-17 DIAGNOSIS — I34.0 NON-RHEUMATIC MITRAL REGURGITATION: ICD-10-CM

## 2019-09-17 DIAGNOSIS — N64.4 BREAST PAIN IN MALE: ICD-10-CM

## 2019-09-17 DIAGNOSIS — Z85.46 HISTORY OF PROSTATE CANCER: ICD-10-CM

## 2019-09-17 DIAGNOSIS — N28.9 RENAL INSUFFICIENCY: ICD-10-CM

## 2019-09-17 DIAGNOSIS — E55.9 VITAMIN D DEFICIENCY: ICD-10-CM

## 2019-09-17 PROCEDURE — 99214 OFFICE O/P EST MOD 30 MIN: CPT | Mod: 25 | Performed by: INTERNAL MEDICINE

## 2019-09-17 PROCEDURE — 90662 IIV NO PRSV INCREASED AG IM: CPT | Performed by: INTERNAL MEDICINE

## 2019-09-17 PROCEDURE — G0008 ADMIN INFLUENZA VIRUS VAC: HCPCS | Performed by: INTERNAL MEDICINE

## 2019-09-18 NOTE — PROGRESS NOTES
CC: Sukumar Quiñonez is a 78 y.o. male is suffering from   Chief Complaint   Patient presents with   • Diabetes     3 months follow up         SUBJECTIVE:  1. Need for vaccination  Ez is here for follow-up, is in need of an influenza vaccination which was given    2. Breast pain in male  Patient is a  is being followed by the [facility] regarding breast pain, if mammogram or other additional treatment is not available there we will proceed to have this done through [facility]    3. Non-rheumatic mitral regurgitation  Followed by cardiology    4. Vitamin D deficiency  Ongoing    5. History of prostate cancer  Stable    6. Renal insufficiency  Ongoing    7. CML (chronic myelocytic leukemia) (HCC)  Recheck CBC follow-up with oncology as necessary        Past social, family, history:   Social History     Tobacco Use   • Smoking status: Never Smoker   • Smokeless tobacco: Never Used   Substance Use Topics   • Alcohol use: No   • Drug use: No         MEDICATIONS:    Current Outpatient Medications:   •  glimepiride (AMARYL) 4 MG Tab, Take 4 mg by mouth every morning., Disp: , Rfl:   •  pantoprazole (PROTONIX) 20 MG tablet, Take 20 mg by mouth 2 times a day., Disp: , Rfl:   •  liraglutide (VICTOZA) 18 MG/3ML Solution Pen-injector injection, Inject 0.6 mg as instructed every day., Disp: , Rfl:   •  atorvastatin (LIPITOR) 10 MG Tab, Take 10 mg by mouth every evening., Disp: , Rfl:   •  doxazosin (CARDURA) 2 MG Tab, Take 2 mg by mouth every day., Disp: , Rfl:   •  furosemide (LASIX) 40 MG Tab, TAKE ONE TABLET BY MOUTH ONCE DAILY, Disp: 90 Tab, Rfl: 3  •  benazepril (LOTENSIN) 10 MG Tab, TAKE ONE TABLET BY MOUTH ONCE DAILY, Disp: 90 Tab, Rfl: 3  •  calcitRIOL (ROCALTROL) 0.25 MCG Cap, Take 0.25 mcg by mouth every 48 hours., Disp: , Rfl:   •  vitamin D (CHOLECALCIFEROL) 1000 UNIT Tab, Take 2,000 Units by mouth every day., Disp: , Rfl:   •  ascorbic acid (ASCORBIC ACID) 500 MG Tab, Take  500 mg by mouth every day., Disp: , Rfl:   •  KLOR-CON M20 20 MEQ Tab CR, TAKE ONE TABLET BY MOUTH TWICE DAILY, Disp: 180 Tab, Rfl: 3  •  ranitidine (ZANTAC) 150 MG Tab, TAKE ONE TABLET BY MOUTH TWICE DAILY, Disp: 180 Tab, Rfl: 3  •  imatinib (GLEEVEC) 100 MG TABS, Take 300 mg by mouth every day., Disp: , Rfl:   •  LANTUS 100 UNIT/ML Solution, INJECT 10 UNITS SUBCUTANEOUSLY ONCE DAILY AT BEDTIME AS DIRECTED (Patient not taking: Reported on 9/17/2019), Disp: 10 mL, Rfl: 11  •  Kristie, Zingiber officinalis, (KRISTIE PO), Take  by mouth., Disp: , Rfl:     PROBLEMS:  Patient Active Problem List    Diagnosis Date Noted   • Class 2 obesity with body mass index (BMI) of 37.0 to 37.9 in adult 06/19/2019   • Postoperative pain 06/14/2018   • Acute medial meniscus tear of left knee 10/24/2017   • Wound of left lower extremity 08/15/2017   • Wound infection 08/07/2017   • Hypoglycemia 05/26/2017   • Elevated TSH 05/26/2017   • Type 2 diabetes mellitus (HCC) 05/26/2017   • Type II diabetes mellitus, well controlled ([facility]) 01/18/2017   • HTN (hypertension) 01/27/2012   • Dyslipidemia 01/27/2012   • Aortic valve insufficiency 08/25/2011   • CML (chronic myelocytic leukemia) ([facility]) 08/25/2011       REVIEW OF SYSTEMS:  Gen.:  No Nausea, Vomiting, fever, Chills.  Heart: No chest pain.  Lungs:  No shortness of Breath.  Psychological: Carlos unusual Anxiety depression     PHYSICAL EXAM   Constitutional: Alert, cooperative, not in acute distress.  Cardiovascular:  Rate Rhythm is regular 3/6 heart murmurs at both right second interspace left second interspace left sixth interspace no rubs clicks.     Thorax & Lungs: Clear to auscultation, no wheezing, rhonchi, or rales  HENT: Normocephalic, Atraumatic.  Eyes: PERRLA, EOMI, Conjunctiva normal.   Neck: Trachia is midline no swelling of the thyroid.   Lymphatic: No lymphadenopathy noted.   Neurologic: Alert & oriented x 3, cranial nerves II through XII are intact, Normal motor function, Normal  "sensory function, No focal deficits noted.   Psychiatric: Affect normal, Judgment normal, Mood normal.     VITAL SIGNS:/70   Pulse 70   Temp 36.5 °C (97.7 °F) (Temporal)   Resp 14   Ht 1.854 m (6' 1\")   Wt (!) 128.3 kg (282 lb 12.8 oz)   SpO2 93%   BMI 37.31 kg/m²     Labs: Reviewed    Assessment:                                                     Plan:    1. Need for vaccination  Vaccination given  - Influenza Vaccine, High Dose (65+ Only)    2. Breast pain in male  Ultrasound pending through [facility]    3. Non-rheumatic mitral regurgitation  Continued follow-up with cardiology echocardiogram reviewed    4. Vitamin D deficiency  Recheck vitamin D  - VITAMIN D,25 HYDROXY; Future    5. History of prostate cancer  Recheck comprehensive metabolic panel PSA  - Comp Metabolic Panel; Future  - PROSTATE SPECIFIC AG DIAGNOSTIC; Future    6. Renal insufficiency  Check comprehensive metabolic panel  - Comp Metabolic Panel; Future    7. CML (chronic myelocytic leukemia) (HCC)  Recheck CBC  - CBC WITH DIFFERENTIAL; Future        "

## 2019-10-03 ENCOUNTER — HOSPITAL ENCOUNTER (OUTPATIENT)
Dept: LAB | Facility: MEDICAL CENTER | Age: 79
End: 2019-10-03
Attending: UROLOGY
Payer: MEDICARE

## 2019-10-03 PROCEDURE — 84153 ASSAY OF PSA TOTAL: CPT

## 2019-10-03 PROCEDURE — 36415 COLL VENOUS BLD VENIPUNCTURE: CPT

## 2019-10-04 LAB — PSA SERPL-MCNC: 1.41 NG/ML (ref 0–4)

## 2019-11-08 ENCOUNTER — HOSPITAL ENCOUNTER (OUTPATIENT)
Dept: LAB | Facility: MEDICAL CENTER | Age: 79
End: 2019-11-08
Attending: INTERNAL MEDICINE
Payer: MEDICARE

## 2020-12-02 ENCOUNTER — PATIENT OUTREACH (OUTPATIENT)
Dept: HEALTH INFORMATION MANAGEMENT | Facility: OTHER | Age: 80
End: 2020-12-02

## 2020-12-03 NOTE — PROGRESS NOTES
Welcome Call for [facility]    Outcome:   Spoke to patient. Scheduled appointment with Dr. Spangler.  Confirmed no active COVID-19 symptoms, no exposure. advised to wear mask.      Attempt # 1  -aep   English

## 2021-01-11 DIAGNOSIS — Z23 NEED FOR VACCINATION: ICD-10-CM

## 2021-02-03 ENCOUNTER — TELEPHONE (OUTPATIENT)
Dept: MEDICAL GROUP | Facility: PHYSICIAN GROUP | Age: 81
End: 2021-02-03

## 2021-02-03 NOTE — TELEPHONE ENCOUNTER
ESTABLISHED PATIENT PRE-VISIT PLANNING     Patient was NOT contacted to complete PVP.     Note: Patient will not be contacted if there is no indication to call.     1.  Reviewed notes from the last few office visits within the medical group: Yes    2.  If any orders were placed at last visit or intended to be done for this visit (i.e. 6 mos follow-up), do we have Results/Consult Notes?         •  Labs - Labs were not ordered at last office visit.  Note: If patient appointment is for lab review and patient did not complete labs, check with provider if OK to reschedule patient until labs completed.       •  Imaging - Imaging was not ordered at last office visit.       •  Referrals - No referrals were ordered at last office visit.    3. Is this appointment scheduled as a Hospital Follow-Up? No    4.  Immunizations were updated in Epic using Reconcile Outside Information activity? No    5.  Patient is due for the following Health Maintenance Topics:   Health Maintenance Due   Topic Date Due   • COVID-19 Vaccine (1 of 2) 10/30/1956   • IMM HEP B VACCINE (1 of 3 - Risk 3-dose series) 10/30/1958   • IMM ZOSTER VACCINES (1 of 2) 10/30/1989   • DIABETES MONOFILAMENT / LE EXAM  03/07/2019   • RETINAL SCREENING  06/06/2019   • Annual Wellness Visit  06/07/2019   • FASTING LIPID PROFILE  01/21/2020   • A1C SCREENING  02/29/2020   • URINE ACR / MICROALBUMIN  03/28/2020   • SERUM CREATININE  08/30/2020   • IMM INFLUENZA (1) 09/01/2020       6.  AHA (Pulse8) form printed for Provider? Yes

## 2021-04-09 ENCOUNTER — PATIENT MESSAGE (OUTPATIENT)
Dept: HEALTH INFORMATION MANAGEMENT | Facility: OTHER | Age: 81
End: 2021-04-09

## 2021-04-26 ENCOUNTER — PATIENT OUTREACH (OUTPATIENT)
Dept: HEALTH INFORMATION MANAGEMENT | Facility: OTHER | Age: 81
End: 2021-04-26

## 2021-04-26 NOTE — PROGRESS NOTES
Outcome:  is not interstead on the  Comprehensive Geriatric Assessment    Please transfer to Patient Outreach Team at [phone] when patient returns call.      Attempt #2

## 2021-09-23 ENCOUNTER — HOSPITAL ENCOUNTER (OUTPATIENT)
Facility: MEDICAL CENTER | Age: 81
End: 2021-09-23
Attending: UROLOGY | Admitting: UROLOGY
Payer: MEDICARE

## 2021-10-15 ENCOUNTER — TELEPHONE (OUTPATIENT)
Dept: MEDICAL GROUP | Facility: LAB | Age: 81
End: 2021-10-15

## 2021-10-15 NOTE — TELEPHONE ENCOUNTER
APPOINTMENT SCHEDULING  Patient overdue for appointment. Called patient to schedule appointment.  Phone Number Called: [phone]    Call outcome: I spoke with Sukumar and he said he only uses [facility] for dental, and [facility]. He does not want to make AWV with Dr. Castano.

## 2021-12-01 ENCOUNTER — PRE-ADMISSION TESTING (OUTPATIENT)
Dept: ADMISSIONS | Facility: MEDICAL CENTER | Age: 81
End: 2021-12-01
Attending: UROLOGY
Payer: MEDICARE

## 2021-12-01 VITALS — HEIGHT: 73 IN | BODY MASS INDEX: 37.43 KG/M2 | WEIGHT: 282.41 LBS

## 2021-12-01 DIAGNOSIS — Z01.810 PRE-OPERATIVE CARDIOVASCULAR EXAMINATION: ICD-10-CM

## 2021-12-01 DIAGNOSIS — Z01.812 PRE-OPERATIVE LABORATORY EXAMINATION: ICD-10-CM

## 2021-12-01 LAB
ANION GAP SERPL CALC-SCNC: 10 MMOL/L (ref 7–16)
BASOPHILS # BLD AUTO: 0.4 % (ref 0–1.8)
BASOPHILS # BLD: 0.03 K/UL (ref 0–0.12)
BUN SERPL-MCNC: 32 MG/DL (ref 8–22)
CALCIUM SERPL-MCNC: 8.1 MG/DL (ref 8.5–10.5)
CHLORIDE SERPL-SCNC: 108 MMOL/L (ref 96–112)
CO2 SERPL-SCNC: 21 MMOL/L (ref 20–33)
CREAT SERPL-MCNC: 1.43 MG/DL (ref 0.5–1.4)
EOSINOPHIL # BLD AUTO: 0.28 K/UL (ref 0–0.51)
EOSINOPHIL NFR BLD: 4 % (ref 0–6.9)
ERYTHROCYTE [DISTWIDTH] IN BLOOD BY AUTOMATED COUNT: 53.5 FL (ref 35.9–50)
EST. AVERAGE GLUCOSE BLD GHB EST-MCNC: 194 MG/DL
GLUCOSE SERPL-MCNC: 232 MG/DL (ref 65–99)
HBA1C MFR BLD: 8.4 % (ref 4–5.6)
HCT VFR BLD AUTO: 34.3 % (ref 42–52)
HGB BLD-MCNC: 10.7 G/DL (ref 14–18)
IMM GRANULOCYTES # BLD AUTO: 0.02 K/UL (ref 0–0.11)
IMM GRANULOCYTES NFR BLD AUTO: 0.3 % (ref 0–0.9)
INR PPP: 1.12 (ref 0.87–1.13)
LYMPHOCYTES # BLD AUTO: 0.81 K/UL (ref 1–4.8)
LYMPHOCYTES NFR BLD: 11.7 % (ref 22–41)
MCH RBC QN AUTO: 31.6 PG (ref 27–33)
MCHC RBC AUTO-ENTMCNC: 31.2 G/DL (ref 33.7–35.3)
MCV RBC AUTO: 101.2 FL (ref 81.4–97.8)
MONOCYTES # BLD AUTO: 0.46 K/UL (ref 0–0.85)
MONOCYTES NFR BLD AUTO: 6.6 % (ref 0–13.4)
NEUTROPHILS # BLD AUTO: 5.32 K/UL (ref 1.82–7.42)
NEUTROPHILS NFR BLD: 77 % (ref 44–72)
NRBC # BLD AUTO: 0 K/UL
NRBC BLD-RTO: 0 /100 WBC
PLATELET # BLD AUTO: 143 K/UL (ref 164–446)
PMV BLD AUTO: 11.3 FL (ref 9–12.9)
POTASSIUM SERPL-SCNC: 4.9 MMOL/L (ref 3.6–5.5)
PROTHROMBIN TIME: 14 SEC (ref 12–14.6)
RBC # BLD AUTO: 3.39 M/UL (ref 4.7–6.1)
SODIUM SERPL-SCNC: 139 MMOL/L (ref 135–145)
WBC # BLD AUTO: 6.9 K/UL (ref 4.8–10.8)

## 2021-12-01 PROCEDURE — 83036 HEMOGLOBIN GLYCOSYLATED A1C: CPT

## 2021-12-01 PROCEDURE — 93005 ELECTROCARDIOGRAM TRACING: CPT

## 2021-12-01 PROCEDURE — 36415 COLL VENOUS BLD VENIPUNCTURE: CPT

## 2021-12-01 PROCEDURE — 85610 PROTHROMBIN TIME: CPT

## 2021-12-01 PROCEDURE — 80048 BASIC METABOLIC PNL TOTAL CA: CPT

## 2021-12-01 PROCEDURE — 85025 COMPLETE CBC W/AUTO DIFF WBC: CPT

## 2021-12-02 LAB — EKG IMPRESSION: NORMAL

## 2021-12-02 PROCEDURE — 93010 ELECTROCARDIOGRAM REPORT: CPT | Performed by: INTERNAL MEDICINE

## 2021-12-02 NOTE — OR NURSING
Pt unable to provide ordered urine sample for PAT testing due to incontinence. Left VM for Dr. Cole's office regarding this. Other preop labs and imaging completed.

## 2021-12-03 ENCOUNTER — HOSPITAL ENCOUNTER (OUTPATIENT)
Facility: MEDICAL CENTER | Age: 81
End: 2021-12-03
Attending: UROLOGY
Payer: MEDICARE

## 2021-12-03 PROCEDURE — 87077 CULTURE AEROBIC IDENTIFY: CPT

## 2021-12-03 PROCEDURE — 87186 SC STD MICRODIL/AGAR DIL: CPT

## 2021-12-03 PROCEDURE — 87086 URINE CULTURE/COLONY COUNT: CPT

## 2021-12-06 LAB
BACTERIA UR CULT: ABNORMAL
BACTERIA UR CULT: ABNORMAL
SIGNIFICANT IND 70042: ABNORMAL
SITE SITE: ABNORMAL
SOURCE SOURCE: ABNORMAL

## 2022-01-10 NOTE — WOUND TEAM
Advanced Wound Care  [facility]  [address]  [address]  [place] [address]  [phone] Fax: [phone]    Encounter Note  For Certification Period: 8/4/17 - 9/4/17      Referring Physician: Edgardo Castano DO  Primary Physician:        Consulting Physicians: Dr. Hernandez        Wound(s): L ankle  Start of Care: 8/4/17       Subjective:  I've had trouble of this leg since 1987.       HPI:  Pt reports he cut his leg badly, at work, in 1987, and he has had trouble with the area, off and on, ever since. Pt has CML, DM2, CKD, Hx prostate CA.               Pain: Pt denies pain           Allergies: Nkda      Objective:      Tests and Measures: Brisk, triphasic DP and PT pulses  8/4/17 - Wound culture obtained  Pt reports blood sugars run between 80 and 140.    Orthotic, protective, supportive devices:     Fall Risk Assessment (neyda all that apply with an X): Completed at initial eval 08/04/2017              Wound Characteristics                                                    Location: L ankle   Initial Evaluation  Date: 8/4/17 Encounter Date:  8/17/17   Tissue Type and %: Deep red skin with several open, weeping areas along superior edge. 95% dry, crusty skin, 5% scattered open areas with moist red tissue   Periwound: Satellite wounds circumferentially to about 3 cm out. hemosiderin staining   Drainage: Serous, moderate. None   Exposed structures None None   Wound Edges:   Indistinct.  Irregular   Odor: None None   S&S of Infection:   Erythema None   Edema: 1+ to LE 1+ LE   Sensation: intact Intact               Measurements: L ankle. Measured affected area. Initial Evaluation  Date: 8/4/17 Encounter Date: 8/17/2017   Length (cm) 13 Few scattered open areas   Width (cm) 10    Depth (cm) BELLA    Area (cm2) 130    Tract/undermine None         Procedures:     Debridement: Non selective with NS gauze, and cotton tipped applicator to remove dry flaky skin. Approx 5cm2 removed   Cleansed with:  No  "rinse foam and washcloth                                                                       Periwound protected with: No sting skin prep   Primary dressing: Nystatin powder    Secondary Dressing: Super absorb to cover area secured with hypafix   Other: Tubi F     Patient Education: POC discussed and rationale for dressing selection. Pt states that he has had some vascular studies done \"a few years ago\" but has not had any since. Asked for pt to be added to Dr. La schedule via green sheet for evaluation of vascular status. Instructed pt on s/s infection - chills, fever, malaise, NV, increased redness/swelling/pain/exudate - and to go to ER/Urgent Care; to keep dressing D/I, and return to [facility] 2x/week for dressing management.     Professional Collaboration: None today   Assessment:      Wound etiology: CVI    Wound Progress: Few small open areas.Dry flaky skin.     Rationale for Treatment:  Nystatin powder as this worked well recently for patient.     Patient tolerance/compliance: Pt tolerated care well and expresses desire to care for wound as recommended.    Complicating factors: Diabetes, leukemia (and medication), chronic kidney disease, obesity    Need for ongoing Advanced Wound Care services: Needs advanced wound care for ongoing debridement, dressing selection, education.      Plan:      Treatment Plan and Recommendations:  Diagnosis/ICD9: S91.002D    Procedures/CPT: CSWD    Frequency: 2X/week      Treatment Goals: STG 2 Weeks  LTG 4 Weeks   Granulation Tissue: % %   Decrease Necrotic Tissue to: % %   Wound Phase:      Decrease Size by: 10% 20%   Periwound:      Decrease tracts/undermining by: % %   Decrease Pain:          At the time of each visit a thorough assessment of the patient is completed to assure the  appropriateness of our plan of care.  The dressings or modalities may need to be adapted   from the original plan to address any significant changes in the wound environment.          Clinician " Signature:_______________________________Date__________________      Physician Signature:______________________________Date:__________________     Fluconazole Counseling:  Patient counseled regarding adverse effects of fluconazole including but not limited to headache, diarrhea, nausea, upset stomach, liver function test abnormalities, taste disturbance, and stomach pain.  There is a rare possibility of liver failure that can occur when taking fluconazole.  The patient understands that monitoring of LFTs and kidney function test may be required, especially at baseline. The patient verbalized understanding of the proper use and possible adverse effects of fluconazole.  All of the patient's questions and concerns were addressed.

## (undated) DEVICE — HUMID-VENT HEAT AND MOISTURE EXCHANGE- (50/BX)

## (undated) DEVICE — DRAPE LOWER EXTREMETY - (6/CA)

## (undated) DEVICE — NEPTUNE 4 PORT MANIFOLD - (20/PK)

## (undated) DEVICE — GLOVE, LITE (PAIR)

## (undated) DEVICE — GLOVE BIOGEL INDICATOR SZ 8 SURGICAL PF LTX - (50/BX 4BX/CA)

## (undated) DEVICE — PROTECTOR ULNA NERVE - (36PR/CA)

## (undated) DEVICE — DRAPE U SPLIT IMP 54 X 76 - (24/CA)

## (undated) DEVICE — SENSOR SPO2 NEO LNCS ADHESIVE (20/BX) SEE USER NOTES

## (undated) DEVICE — BANDAGE ELASTIC STERILE VELCRO 6 X 5 YDS (25EA/CA)

## (undated) DEVICE — SUCTION INSTRUMENT YANKAUER BULBOUS TIP W/O VENT (50EA/CA)

## (undated) DEVICE — BLADE SHAVER AGGRESSIVE PLUS 4.0MM ANGLED (5EA/BX)

## (undated) DEVICE — DRAPE LARGE 3 QUARTER - (20/CA)

## (undated) DEVICE — SYRINGE 30 ML LL (56/BX)

## (undated) DEVICE — HEAD HOLDER JUNIOR/ADULT

## (undated) DEVICE — GOWN SURGICAL XX-LARGE - (28EA/CA) SIRUS NON REINFORCED

## (undated) DEVICE — MASK ANESTHESIA ADULT  - (100/CA)

## (undated) DEVICE — MASK  AIRWAY SIZE 5 UNIQUE SILICON (10EA/BX)

## (undated) DEVICE — GLOVE SZ 7.5 LF PROTEXIS (50PR/BX)

## (undated) DEVICE — TUBING PATIENT W/CONNECTOR REDEUCE (1EA)

## (undated) DEVICE — GLOVE BIOGEL INDICATOR SZ 7.5 SURGICAL PF LTX - (50PR/BX 4BX/CA)

## (undated) DEVICE — SPONGE GAUZESTER 4 X 4 4PLY - (128PK/CA)

## (undated) DEVICE — GLOVE BIOGEL SZ 7 SURGICAL PF LTX - (50PR/BX 4BX/CA)

## (undated) DEVICE — LACTATED RINGERS INJ 1000 ML - (14EA/CA 60CA/PF)

## (undated) DEVICE — MASK, LARYNGEAL AIRWAY #4

## (undated) DEVICE — CANISTER SUCTION RIGID RED 1500CC (40EA/CA)

## (undated) DEVICE — KIT ROOM DECONTAMINATION

## (undated) DEVICE — CHLORAPREP 26 ML APPLICATOR - ORANGE TINT(25/CA)

## (undated) DEVICE — GLOVE BIOGEL SZ 8 SURGICAL PF LTX - (50PR/BX 4BX/CA)

## (undated) DEVICE — SUTURE GENERAL

## (undated) DEVICE — ELECTRODE 850 FOAM ADHESIVE - HYDROGEL RADIOTRNSPRNT (50/PK)

## (undated) DEVICE — PACK KNEE ARTHROSCOPY SM OR - (2EA/CA)

## (undated) DEVICE — ELECTRODE DUAL RETURN W/ CORD - (50/PK)

## (undated) DEVICE — SUTURE 3-0 ETHILON FS-1 - (36/BX) 30 INCH

## (undated) DEVICE — SODIUM CHL. IRRIGATION 0.9% 3000ML (4EA/CA 65CA/PF)

## (undated) DEVICE — GOWN WARMING STANDARD FLEX - (30/CA)

## (undated) DEVICE — KIT ANESTHESIA W/CIRCUIT & 3/LT BAG W/FILTER (20EA/CA)

## (undated) DEVICE — TUBING PUMP WITH CONNECTOR REDEUCE (1EA)

## (undated) DEVICE — TUBE CONNECTING SUCTION - CLEAR PLASTIC STERILE 72 IN (50EA/CA)

## (undated) DEVICE — GLOVE BIOGEL INDICATOR SZ 7SURGICAL PF LTX - (50/BX 4BX/CA)

## (undated) DEVICE — NEEDLE SAFETY 18 GA X 1 1/2 IN (100EA/BX)

## (undated) DEVICE — GLOVE BIOGEL PI ULTRATOUCH SZ 8.0 SURGICAL PF LF - (50/BX 4BX/CA)

## (undated) DEVICE — SODIUM CHL IRRIGATION 0.9% 1000ML (12EA/CA)

## (undated) DEVICE — GLOVE BIOGEL PI INDICATOR SZ 8.0 SURGICAL PF LF -(50/BX 4BX/CA)

## (undated) DEVICE — GLOVE BIOGEL PI INDICATOR SZ 7.5 SURGICAL PF LF -(50/BX 4BX/CA)